# Patient Record
Sex: MALE | Race: WHITE | Employment: FULL TIME | URBAN - METROPOLITAN AREA
[De-identification: names, ages, dates, MRNs, and addresses within clinical notes are randomized per-mention and may not be internally consistent; named-entity substitution may affect disease eponyms.]

---
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
## 2017-01-17 ENCOUNTER — HOSPITAL ENCOUNTER (INPATIENT)
Age: 62
LOS: 4 days | Discharge: HOME HEALTH | DRG: 617 | End: 2017-01-21
Attending: EMERGENCY MEDICINE | Admitting: PODIATRIST
Payer: COMMERCIAL

## 2017-01-17 ENCOUNTER — APPOINTMENT (OUTPATIENT)
Dept: MRI IMAGING | Age: 62
DRG: 617 | End: 2017-01-17
Attending: EMERGENCY MEDICINE
Payer: COMMERCIAL

## 2017-01-17 DIAGNOSIS — L97.529 DIABETIC ULCER OF BOTH FEET ASSOCIATED WITH OTHER SPECIFIED DIABETES MELLITUS: Primary | ICD-10-CM

## 2017-01-17 DIAGNOSIS — L97.519 DIABETIC ULCER OF BOTH FEET ASSOCIATED WITH OTHER SPECIFIED DIABETES MELLITUS: Primary | ICD-10-CM

## 2017-01-17 DIAGNOSIS — E13.621 DIABETIC ULCER OF BOTH FEET ASSOCIATED WITH OTHER SPECIFIED DIABETES MELLITUS: Primary | ICD-10-CM

## 2017-01-17 PROBLEM — E11.40 DIABETES MELLITUS WITH NEUROPATHY (HCC): Status: ACTIVE | Noted: 2017-01-17

## 2017-01-17 PROBLEM — M86.9 OSTEOMYELITIS OF LEFT FOOT (HCC): Status: ACTIVE | Noted: 2017-01-17

## 2017-01-17 PROBLEM — M86.9 OSTEOMYELITIS OF RIGHT FOOT (HCC): Status: ACTIVE | Noted: 2017-01-17

## 2017-01-17 LAB
ALBUMIN SERPL BCP-MCNC: 2.5 G/DL (ref 3.5–5)
ALBUMIN/GLOB SERPL: 0.4 {RATIO} (ref 1.1–2.2)
ALP SERPL-CCNC: 96 U/L (ref 45–117)
ALT SERPL-CCNC: 134 U/L (ref 12–78)
ANION GAP BLD CALC-SCNC: 10 MMOL/L (ref 5–15)
APTT PPP: 31.5 SEC (ref 22.1–32.5)
AST SERPL W P-5'-P-CCNC: 49 U/L (ref 15–37)
BASOPHILS # BLD AUTO: 0 K/UL (ref 0–0.1)
BASOPHILS # BLD: 0 % (ref 0–1)
BILIRUB SERPL-MCNC: 0.3 MG/DL (ref 0.2–1)
BUN SERPL-MCNC: 15 MG/DL (ref 6–20)
BUN/CREAT SERPL: 14 (ref 12–20)
CALCIUM SERPL-MCNC: 9.4 MG/DL (ref 8.5–10.1)
CHLORIDE SERPL-SCNC: 100 MMOL/L (ref 97–108)
CO2 SERPL-SCNC: 26 MMOL/L (ref 21–32)
CREAT SERPL-MCNC: 1.08 MG/DL (ref 0.7–1.3)
EOSINOPHIL # BLD: 0.1 K/UL (ref 0–0.4)
EOSINOPHIL NFR BLD: 1 % (ref 0–7)
ERYTHROCYTE [DISTWIDTH] IN BLOOD BY AUTOMATED COUNT: 13.3 % (ref 11.5–14.5)
ERYTHROCYTE [SEDIMENTATION RATE] IN BLOOD: 30 MM/HR (ref 0–20)
GLOBULIN SER CALC-MCNC: 6.1 G/DL (ref 2–4)
GLUCOSE SERPL-MCNC: 224 MG/DL (ref 65–100)
HCT VFR BLD AUTO: 42.3 % (ref 36.6–50.3)
HGB BLD-MCNC: 14.5 G/DL (ref 12.1–17)
INR PPP: 1.1 (ref 0.9–1.1)
LACTATE SERPL-SCNC: 1.9 MMOL/L (ref 0.4–2)
LYMPHOCYTES # BLD AUTO: 17 % (ref 12–49)
LYMPHOCYTES # BLD: 1.6 K/UL (ref 0.8–3.5)
MCH RBC QN AUTO: 30.5 PG (ref 26–34)
MCHC RBC AUTO-ENTMCNC: 34.3 G/DL (ref 30–36.5)
MCV RBC AUTO: 88.9 FL (ref 80–99)
MONOCYTES # BLD: 0.8 K/UL (ref 0–1)
MONOCYTES NFR BLD AUTO: 8 % (ref 5–13)
NEUTS SEG # BLD: 7.3 K/UL (ref 1.8–8)
NEUTS SEG NFR BLD AUTO: 74 % (ref 32–75)
PLATELET # BLD AUTO: 395 K/UL (ref 150–400)
POTASSIUM SERPL-SCNC: 4.2 MMOL/L (ref 3.5–5.1)
PROT SERPL-MCNC: 8.6 G/DL (ref 6.4–8.2)
PROTHROMBIN TIME: 10.7 SEC (ref 9–11.1)
RBC # BLD AUTO: 4.76 M/UL (ref 4.1–5.7)
SODIUM SERPL-SCNC: 136 MMOL/L (ref 136–145)
THERAPEUTIC RANGE,PTTT: NORMAL SECS (ref 58–77)
WBC # BLD AUTO: 9.9 K/UL (ref 4.1–11.1)

## 2017-01-17 PROCEDURE — 65270000029 HC RM PRIVATE

## 2017-01-17 PROCEDURE — 85652 RBC SED RATE AUTOMATED: CPT | Performed by: EMERGENCY MEDICINE

## 2017-01-17 PROCEDURE — 36415 COLL VENOUS BLD VENIPUNCTURE: CPT | Performed by: EMERGENCY MEDICINE

## 2017-01-17 PROCEDURE — 80053 COMPREHEN METABOLIC PANEL: CPT | Performed by: PHYSICIAN ASSISTANT

## 2017-01-17 PROCEDURE — 85025 COMPLETE CBC W/AUTO DIFF WBC: CPT | Performed by: PHYSICIAN ASSISTANT

## 2017-01-17 PROCEDURE — 85730 THROMBOPLASTIN TIME PARTIAL: CPT | Performed by: EMERGENCY MEDICINE

## 2017-01-17 PROCEDURE — 99284 EMERGENCY DEPT VISIT MOD MDM: CPT

## 2017-01-17 PROCEDURE — 87040 BLOOD CULTURE FOR BACTERIA: CPT | Performed by: EMERGENCY MEDICINE

## 2017-01-17 PROCEDURE — 85610 PROTHROMBIN TIME: CPT | Performed by: EMERGENCY MEDICINE

## 2017-01-17 PROCEDURE — 73720 MRI LWR EXTREMITY W/O&W/DYE: CPT

## 2017-01-17 PROCEDURE — 74011250636 HC RX REV CODE- 250/636: Performed by: EMERGENCY MEDICINE

## 2017-01-17 PROCEDURE — 93005 ELECTROCARDIOGRAM TRACING: CPT

## 2017-01-17 PROCEDURE — A9585 GADOBUTROL INJECTION: HCPCS | Performed by: EMERGENCY MEDICINE

## 2017-01-17 PROCEDURE — 83605 ASSAY OF LACTIC ACID: CPT | Performed by: EMERGENCY MEDICINE

## 2017-01-17 RX ORDER — PRAVASTATIN SODIUM 20 MG/1
20 TABLET ORAL
COMMUNITY
End: 2018-10-22 | Stop reason: SDUPTHER

## 2017-01-17 RX ORDER — BISMUTH SUBSALICYLATE 262 MG
1 TABLET,CHEWABLE ORAL DAILY
COMMUNITY
End: 2019-05-08 | Stop reason: ALTCHOICE

## 2017-01-17 RX ORDER — LISINOPRIL 5 MG/1
5 TABLET ORAL DAILY
COMMUNITY
End: 2018-10-22 | Stop reason: SDUPTHER

## 2017-01-17 RX ORDER — SODIUM CHLORIDE 9 MG/ML
125 INJECTION, SOLUTION INTRAVENOUS CONTINUOUS
Status: DISCONTINUED | OUTPATIENT
Start: 2017-01-17 | End: 2017-01-18

## 2017-01-17 RX ORDER — GLYBURIDE 5 MG/1
5 TABLET ORAL 3 TIMES DAILY
COMMUNITY
End: 2018-10-22 | Stop reason: SDUPTHER

## 2017-01-17 RX ORDER — SULFAMETHOXAZOLE AND TRIMETHOPRIM 800; 160 MG/1; MG/1
1 TABLET ORAL 2 TIMES DAILY
COMMUNITY
End: 2017-02-02 | Stop reason: SDUPTHER

## 2017-01-17 RX ORDER — GUAIFENESIN 100 MG/5ML
81 LIQUID (ML) ORAL DAILY
COMMUNITY

## 2017-01-17 RX ORDER — CEPHALEXIN 500 MG/1
1000 CAPSULE ORAL 2 TIMES DAILY
COMMUNITY
End: 2017-01-21

## 2017-01-17 RX ADMIN — SODIUM CHLORIDE 125 ML/HR: 900 INJECTION, SOLUTION INTRAVENOUS at 18:36

## 2017-01-17 RX ADMIN — GADOBUTROL 9 ML: 604.72 INJECTION INTRAVENOUS at 19:43

## 2017-01-17 RX ADMIN — VANCOMYCIN HYDROCHLORIDE 2250 MG: 10 INJECTION, POWDER, LYOPHILIZED, FOR SOLUTION INTRAVENOUS at 18:35

## 2017-01-17 NOTE — IP AVS SNAPSHOT
[address] [facility] 
[phone] Patient: Briana Romano MRN: [id number] [id number] You are allergic to the following No active allergies Recent Documentation Height Weight BMI Smoking Status 1.778 m 94.8 kg 29.99 kg/m2 Never Smoker Unresulted Labs Order Current Status CULTURE, BLOOD Preliminary result CULTURE, BLOOD Preliminary result Emergency Contacts Name Discharge Info Relation Home Work [facility] DISCHARGE CAREGIVER [3] Sister [23]   [phone] About your hospitalization You were admitted on:  January 17, 2017 You last received care in the:  [facility] ORTHOPEDICS You were discharged on:  January 21, 2017 Unit phone number:  [phone] Why you were hospitalized Your primary diagnosis was:  Not on File Your diagnoses also included:  Diabetes Mellitus With Neuropathy (Hcc), Osteomyelitis Of Left Foot (Hcc), Osteomyelitis Of Right Foot (Hcc) Providers Seen During Your Hospitalizations Provider Role Specialty Primary office phone Ifeoma Alonso MD Attending Provider Emergency Medicine [phone] Marcelina Lord DPM Attending Provider Podiatry [phone] Your Primary Care Physician (PCP) Primary Care Physician Office Phone Office Fax Nigel Vazquez [phone] Follow-up Information Follow up With Details Comments Contact Info Ava Ma MD   [address] [facility] 
[phone] Marcelina Lord DPM In 1 week  [address] [facility] [address] [facility] 
[phone] [address] [id number] 
[phone] Current Discharge Medication List  
  
START taking these medications Dose & Instructions Dispensing Information Comments Morning Noon Evening Bedtime  
 oxyCODONE-acetaminophen 5-325 mg per tablet Commonly known as:  PERCOCET Your next dose is: Today, Tomorrow Other:  _________ Dose:  1-2 Tab Take 1-2 Tabs by mouth every four (4) hours as needed. Max Daily Amount: 12 Tabs. Quantity:  40 Tab Refills:  0 CONTINUE these medications which have CHANGED Dose & Instructions Dispensing Information Comments Morning Noon Evening Bedtime * trimethoprim-sulfamethoxazole 160-800 mg per tablet Commonly known as:  BACTRIM DS, SEPTRA DS What changed:  Another medication with the same name was added. Make sure you understand how and when to take each. Your next dose is: Today, Tomorrow Other:  _________ Dose:  1 Tab Take 1 Tab by mouth two (2) times a day. Refills:  0  
     
   
   
   
  
 * trimethoprim-sulfamethoxazole 160-800 mg per tablet Commonly known as:  BACTRIM DS, SEPTRA DS What changed: You were already taking a medication with the same name, and this prescription was added. Make sure you understand how and when to take each. Your next dose is: Today, Tomorrow Other:  _________ Dose:  1 Tab Take 1 Tab by mouth two (2) times a day for 21 days. Take with food. Take until done. Quantity:  42 Tab Refills:  0  
     
   
   
   
  
 * Notice: This list has 2 medication(s) that are the same as other medications prescribed for you. Read the directions carefully, and ask your doctor or other care provider to review them with you. CONTINUE these medications which have NOT CHANGED Dose & Instructions Dispensing Information Comments Morning Noon Evening Bedtime  
 aspirin 81 mg chewable tablet Your next dose is: Today, Tomorrow Other:  _________ Dose:  81 mg Take 81 mg by mouth daily. Refills:  0  
     
   
   
   
  
 glyBURIDE 5 mg tablet Commonly known as:  Ferlien Sapna Your next dose is: Today, Tomorrow Other:  _________ Dose:  5 mg Take 5 mg by mouth three (3) times daily. Refills:  0 JARDIANCE 10 mg tablet Generic drug:  empagliflozin Your next dose is: Today, Tomorrow Other:  _________ Take  by mouth daily. Refills:  0  
     
   
   
   
  
 lisinopril 5 mg tablet Commonly known as:  Nonah Мария Your next dose is: Today, Tomorrow Other:  _________ Dose:  5 mg Take 5 mg by mouth daily. Refills:  0  
     
   
   
   
  
 multivitamin tablet Commonly known as:  ONE A DAY Your next dose is: Today, Tomorrow Other:  _________ Dose:  1 Tab Take 1 Tab by mouth daily. Refills:  0  
     
   
   
   
  
 pravastatin 20 mg tablet Commonly known as:  PRAVACHOL Your next dose is: Today, Tomorrow Other:  _________ Dose:  20 mg Take 20 mg by mouth nightly. Refills:  0 STOP taking these medications KEFLEX 500 mg capsule Generic drug:  cephALEXin Where to Get Your Medications Information on where to get these meds will be given to you by the nurse or doctor. ! Ask your nurse or doctor about these medications  
  oxyCODONE-acetaminophen 5-325 mg per tablet  
 trimethoprim-sulfamethoxazole 160-800 mg per tablet Discharge Instructions Follow up with Dr. Noah Arroyo (Foot Doc [phone]) in one week. Wound Care: please pack right and left foot wounds with 1/4\" plain packing and dress feet with gauze dressing every other day. Activity: weightbear as tolerated to both feet using surgical shoes. Elevate feet when at rest. 
 
Discharge Orders None MyChart Announcement  We are excited to announce that we are making your provider's discharge notes available to you in [employer]. You will see these notes when they are completed and signed by the physician that discharged you from your recent hospital stay. If you have any questions or concerns about any information you see in [employer], please call the Health Information Department where you were seen or reach out to your Primary Care Provider for more information about your plan of care. Introducing [facility] [place] [facility]! [employer] introduces [employer] patient portal. Now you can access parts of your medical record, email your doctor's office, and request medication refills online. 1. In your internet browser, go to [email or web address] 2. Click on the First Time User? Click Here link in the Sign In box. You will see the New Member Sign Up page. 3. Enter your [employer] Access Code exactly as it appears below. You will not need to use this code after youve completed the sign-up process. If you do not sign up before the expiration date, you must request a new code. · [employer] Access Code: [email or web address] Expires: 4/13/2017 10:44 AM 
 
4. Enter the last four digits of your Social Security Number (xxxx) and Date of Birth (mm/dd/yyyy) as indicated and click Submit. You will be taken to the next sign-up page. 5. Create a ePartnerst ID. This will be your [employer] login ID and cannot be changed, so think of one that is secure and easy to remember. 6. Create a [employer] password. You can change your password at any time. 7. Enter your Password Reset Question and Answer. This can be used at a later time if you forget your password. 8. Enter your e-mail address. You will receive e-mail notification when new information is available in [address] 9. Click Sign Up. You can now view and download portions of your medical record. 10. Click the Download Summary menu link to download a portable copy of your medical information. If you have questions, please visit the Frequently Asked Questions section of the MyChart website. Remember, MyChart is NOT to be used for urgent needs. For medical emergencies, dial 911. Now available from your iPhone and Android! General Information Please provide this summary of care documentation to your next provider. Patient Signature:  ____________________________________________________________ Date:  ____________________________________________________________  
  
Paulene Greener Provider Signature:  ____________________________________________________________ Date:  ____________________________________________________________

## 2017-01-17 NOTE — ED PROVIDER NOTES
HPI Comments: César Beauchamp is a 59 y.o. male with PMhx significant for DM and HTN who presents ambulatory with BL cast boots and dressings on BL feet to the ED by referral from Dr. Naga Sy for surgical admission. Pt states that his left 5th metatarsal and his right 1st metatarsal are infected and acutely exacerbated  x10+ days. Pt notes that he was in [address] [place] [address] Street on 12/30/16 when the blisters on his right foot popped and his great toe has been progressively worsening since. He also discloses that he stepped on a nail with his left foot in June of 2016 and has had ruptured blisters on his left foot since and progressively worsening x10 days. He discloses that he has been experiencing BL foot swelling and numbness of his feet but is not experiencing any foot discomfort. He reports that he saw Dr. Naga Sy yesterday for evaluation and was instructed to proceed to the ED for upcoming surgery. He endorses that he has been on Bactrim and Cephalexin x6 days for the infection but on no abx prior. Pt discloses that he recently got over flu like sx but has not had any sx in ~ a week. He specifically denies any fever, chills, cough, nausea, vomiting, chest pain, or abdominal pain at this time. PCP: Sage Castillo MD      There are no other complaints, changes or physical findings at this time. Written by VEDA Murillo, as dictated by Yumiko Brown MD     The history is provided by the patient. No  was used. No past medical history on file. No past surgical history on file. No family history on file. Social History     Social History    Marital status:      Spouse name: N/A    Number of children: N/A    Years of education: N/A     Occupational History    Not on file.      Social History Main Topics    Smoking status: Not on file    Smokeless tobacco: Not on file    Alcohol use Not on file    Drug use: Not on file    Sexual activity: Not on file Other Topics Concern    Not on file     Social History Narrative         ALLERGIES: Review of patient's allergies indicates no known allergies. Review of Systems   Constitutional: Negative for chills, diaphoresis, fever and unexpected weight change. HENT: Negative for rhinorrhea and sore throat. Eyes: Negative for pain. Respiratory: Negative for cough, shortness of breath, wheezing and stridor. Cardiovascular: Positive for leg swelling (BL feet). Negative for chest pain. Gastrointestinal: Negative for abdominal pain, blood in stool, diarrhea, nausea and vomiting. Genitourinary: Negative for difficulty urinating, dysuria and flank pain. Musculoskeletal: Negative for back pain and neck stiffness. Skin: Positive for wound (lesions/blisters/ulcers on BL feet ). Negative for rash. Neurological: Positive for numbness (BL feet). Negative for seizures, syncope, weakness, light-headedness and headaches. Psychiatric/Behavioral: Negative for confusion. Patient Vitals for the past 12 hrs:   Temp Pulse Resp BP SpO2   01/17/17 1226 97.4 °F (36.3 °C) 82 16 137/68 100 %      Physical Exam   Constitutional: He is oriented to person, place, and time. He appears well-developed and well-nourished. No distress. HENT:   Nose: Nose normal.   Mouth/Throat: Oropharynx is clear and moist. No oropharyngeal exudate. Eyes: Conjunctivae and EOM are normal. Pupils are equal, round, and reactive to light. Right eye exhibits no discharge. Left eye exhibits no discharge. No scleral icterus. Neck: Normal range of motion. Neck supple. No JVD present. Cardiovascular: Normal rate, regular rhythm, normal heart sounds and intact distal pulses. No murmur heard. Pulmonary/Chest: Effort normal and breath sounds normal. No stridor. No respiratory distress. He has no wheezes. He has no rales. Abdominal: Soft. Bowel sounds are normal. He exhibits no distension. There is no tenderness.  There is no rebound and no guarding. Musculoskeletal: He exhibits no edema or tenderness. Neurological: He is alert and oriented to person, place, and time. Skin: Skin is warm and dry. He is not diaphoretic. Macerated, necrotic, foul smelling lesions to the left small toe  Ulcer present on the plantar aspect of the left 4th metatarsal head    Macerated, necrotic, foul smelling lesion on right great toe   Psychiatric: He has a normal mood and affect. Nursing note and vitals reviewed. MDM  Number of Diagnoses or Management Options  Diabetic ulcer of both feet associated with other specified diabetes mellitus ([facility] [address]):      Amount and/or Complexity of Data Reviewed  Clinical lab tests: ordered and reviewed  Tests in the medicine section of CPT®: ordered and reviewed  Review and summarize past medical records: yes  Discuss the patient with other providers: yes (Podiatry )  Independent visualization of images, tracings, or specimens: yes    Patient Progress  Patient progress: stable    ED Course       Procedures    PROGRESS NOTE:  2:14 PM  Pt has been re-evaluated. Pt's BL feet wounds were redressed. Written by Lulú Polo, ED Scribe, as dictated by Ebenezer Hickman MD.     EKG interpretation: (Preliminary)  1435  Rhythm: normal sinus rhythm and 1st degree AV block; and regular . Rate (approx.): 73; Axis: normal; ME interval: normal; QRS interval: normal ; ST/T wave: normal; Other findings: left anterior fascicular block; non-ischemic. Written by Lulú Polo, ED Scribe as dictated by Ebenezer Hickman MD.     CONSULT NOTE:  1944  Ebenezer Hickman MD spoke with Dr. Cisco Martínez,  Specialty: Podiatry   Discussed pt's hx, disposition, and available diagnostic and imaging results. Reviewed care plans. Consultant states that Ebenezer Hickman MD can address the pt's needs for his wounds at this time. Written by Lulú Polo, ED Scribe, as dictated by Ebenezer Hickman MD.     PROGRESS NOTE:  4:41 PM  Ebenezer Hickman MD spoke with Dr. Edith Chan again and was instructed to admit the pt for pre-operation workup. Written by VEDA Norwood, as dictated by Odalys Cronin MD.    LABORATORY TESTS:  Recent Results (from the past 12 hour(s))   CBC WITH AUTOMATED DIFF    Collection Time: 01/17/17  1:19 PM   Result Value Ref Range    WBC 9.9 4.1 - 11.1 K/uL    RBC 4.76 4.10 - 5.70 M/uL    HGB 14.5 12.1 - 17.0 g/dL    HCT 42.3 36.6 - 50.3 %    MCV 88.9 80.0 - 99.0 FL    MCH 30.5 26.0 - 34.0 PG    MCHC 34.3 30.0 - 36.5 g/dL    RDW 13.3 11.5 - 14.5 %    PLATELET 564 130 - 867 K/uL    NEUTROPHILS 74 32 - 75 %    LYMPHOCYTES 17 12 - 49 %    MONOCYTES 8 5 - 13 %    EOSINOPHILS 1 0 - 7 %    BASOPHILS 0 0 - 1 %    ABS. NEUTROPHILS 7.3 1.8 - 8.0 K/UL    ABS. LYMPHOCYTES 1.6 0.8 - 3.5 K/UL    ABS. MONOCYTES 0.8 0.0 - 1.0 K/UL    ABS. EOSINOPHILS 0.1 0.0 - 0.4 K/UL    ABS. BASOPHILS 0.0 0.0 - 0.1 K/UL   METABOLIC PANEL, COMPREHENSIVE    Collection Time: 01/17/17  1:19 PM   Result Value Ref Range    Sodium 136 136 - 145 mmol/L    Potassium 4.2 3.5 - 5.1 mmol/L    Chloride 100 97 - 108 mmol/L    CO2 26 21 - 32 mmol/L    Anion gap 10 5 - 15 mmol/L    Glucose 224 (H) 65 - 100 mg/dL    BUN 15 6 - 20 MG/DL    Creatinine 1.08 0.70 - 1.30 MG/DL    BUN/Creatinine ratio 14 12 - 20      GFR est AA >60 >60 ml/min/1.73m2    GFR est non-AA >60 >60 ml/min/1.73m2    Calcium 9.4 8.5 - 10.1 MG/DL    Bilirubin, total 0.3 0.2 - 1.0 MG/DL     (H) 12 - 78 U/L    AST 49 (H) 15 - 37 U/L    Alk.  phosphatase 96 45 - 117 U/L    Protein, total 8.6 (H) 6.4 - 8.2 g/dL    Albumin 2.5 (L) 3.5 - 5.0 g/dL    Globulin 6.1 (H) 2.0 - 4.0 g/dL    A-G Ratio 0.4 (L) 1.1 - 2.2     EKG, 12 LEAD, INITIAL    Collection Time: 01/17/17  2:35 PM   Result Value Ref Range    Ventricular Rate 73 BPM    Atrial Rate 73 BPM    P-R Interval 212 ms    QRS Duration 94 ms    Q-T Interval 378 ms    QTC Calculation (Bezet) 416 ms    Calculated P Axis 13 degrees    Calculated R Axis -57 degrees Calculated T Axis 37 degrees    Diagnosis       Sinus rhythm with 1st degree AV block  Left anterior fascicular block  No previous ECGs available         IMAGING RESULTS:  MRI FOOT LT W CONT    (Results Pending)       MEDICATIONS GIVEN:  Medications   0.9% sodium chloride infusion (not administered)   vancomycin (VANCOCIN) 1,902 mg in 0.9% sodium chloride 500 mL IVPB (not administered)       IMPRESSION:  1. Diabetic ulcer of both feet associated with other specified diabetes mellitus ([facility] [address]        PLAN:  1. Admission    Admit Note:  4:44 PM  Patient is being admitted to the hospital by Dr. Nick Gutierrez. The results of their tests and reasons for their admission have been discussed with the patient and/or available family. They convey their agreement and understanding for the need to be admitted and for their admission diagnosis. Written by Lj Polo, ED Scribe, as dictated by Gentry Hill MD.    Attestation: This note is prepared by Netta Polo, acting as Scribe for Gentry Hill MD.      Gentry Hill MD: The scribe's documentation has been prepared under my direction and personally reviewed by me in its entirety. I confirm that the note above accurately reflects all work, treatment, procedures, and medical decision making performed by me.

## 2017-01-17 NOTE — ED NOTES
Notified isaiah Lee, for assistance with second blood culture because stick at second location for new IV start was unsuccessful x1. The patient would benefit from a second IV site for admission.   Juliana Bahena, phlebotomist.

## 2017-01-17 NOTE — H&P
Podiatry History and Physical    Subjective:           Patient is a 59 y.o.  male who is being seen for right and left foot wounds with osteomyelitis. Was being seen by Dr. Meagan Fitzpatrick over the last year for wounds. Patient states it all began after stepping on [place]. He notes recent redness, and drainage to the right and left foot with worsening wounds. Denies any n/f/v/c. Was seen by myself yesterday and told to go directly to the ER for IV antibiotics and will need to have foot amputations done. States DM is well controlled. There are no active problems to display for this patient. Past Medical History   Diagnosis Date    Diabetes ([facility] [address]     Hypertension       History reviewed. No pertinent family history. Social History   Substance Use Topics    Smoking status: Never Smoker    Smokeless tobacco: Not on file    Alcohol use No     Past Surgical History   Procedure Laterality Date    Hx orthopaedic        Prior to Admission medications    Medication Sig Start Date End Date Taking? Authorizing Provider   pravastatin (PRAVACHOL) 20 mg tablet Take 20 mg by mouth nightly. Yes Ofelia Arellano MD   lisinopril (PRINIVIL, ZESTRIL) 5 mg tablet Take 5 mg by mouth daily. Yes Ofelia Arellano MD   sAXagliptin-metFORMIN (KOMBIGLYZE XR) 2.5-1,000 mg TM24 Take 2 Tabs by mouth. Yes Ofelia Arellano MD   glyBURIDE (DIABETA) 5 mg tablet Take 5 mg by mouth three (3) times daily. Yes Ofelia Arellano MD   aspirin 81 mg chewable tablet Take 81 mg by mouth daily. Yes Ofelia Arellano MD   multivitamin (ONE A DAY) tablet Take 1 Tab by mouth daily. Yes Ofelia Arellano MD     No Known Allergies     Review of Systems:  A comprehensive review of systems was negative except for that written in the HPI.     Objective:     Patient Vitals for the past 8 hrs:   BP Temp Pulse Resp SpO2 Height Weight   01/17/17 1226 137/68 97.4 °F (36.3 °C) 82 16 100 % 5' 10\" (1.778 m) 95.1 kg (209 lb 10.5 oz) Temp (24hrs), Av.4 °F (36.3 °C), Min:97.4 °F (36.3 °C), Max:97.4 °F (36.3 °C)      General:  Alert, cooperative, well noursished, well developed, appears stated age   Eyes:  Sclera anicteric. Pupils equally round and reactive to light. Mouth/Throat: Mucous membranes normal, oral pharynx clear   Neck: Supple   Lungs:   Clear to auscultation bilaterally, good effort   CV:  Regular rate and rhythm,no murmur, click, rub or gallop   Abdomen:   Soft, non-tender. bowel sounds normal. non-distended   Extremities: Exposed bone to the right great toe with exposed phalanx, and left 5th toe exposed bone probing to bone. Skin: Ulcer to right great toe, and left 5th toe sub 5th mtpj   Lymph nodes: Cervical and supraclavicular normal   Musculoskeletal: No swelling or deformity   Lines/Devices:  Intact, no erythema, drainage or tenderness   Psych: Alert and oriented, normal mood affect given the setting           Data Review:   Recent Results (from the past 24 hour(s))   CBC WITH AUTOMATED DIFF    Collection Time: 17  1:19 PM   Result Value Ref Range    WBC 9.9 4.1 - 11.1 K/uL    RBC 4.76 4.10 - 5.70 M/uL    HGB 14.5 12.1 - 17.0 g/dL    HCT 42.3 36.6 - 50.3 %    MCV 88.9 80.0 - 99.0 FL    MCH 30.5 26.0 - 34.0 PG    MCHC 34.3 30.0 - 36.5 g/dL    RDW 13.3 11.5 - 14.5 %    PLATELET 655 623 - 060 K/uL    NEUTROPHILS 74 32 - 75 %    LYMPHOCYTES 17 12 - 49 %    MONOCYTES 8 5 - 13 %    EOSINOPHILS 1 0 - 7 %    BASOPHILS 0 0 - 1 %    ABS. NEUTROPHILS 7.3 1.8 - 8.0 K/UL    ABS. LYMPHOCYTES 1.6 0.8 - 3.5 K/UL    ABS. MONOCYTES 0.8 0.0 - 1.0 K/UL    ABS. EOSINOPHILS 0.1 0.0 - 0.4 K/UL    ABS.  BASOPHILS 0.0 0.0 - 0.1 K/UL   METABOLIC PANEL, COMPREHENSIVE    Collection Time: 17  1:19 PM   Result Value Ref Range    Sodium 136 136 - 145 mmol/L    Potassium 4.2 3.5 - 5.1 mmol/L    Chloride 100 97 - 108 mmol/L    CO2 26 21 - 32 mmol/L    Anion gap 10 5 - 15 mmol/L    Glucose 224 (H) 65 - 100 mg/dL    BUN 15 6 - 20 MG/DL Creatinine 1.08 0.70 - 1.30 MG/DL    BUN/Creatinine ratio 14 12 - 20      GFR est AA >60 >60 ml/min/1.73m2    GFR est non-AA >60 >60 ml/min/1.73m2    Calcium 9.4 8.5 - 10.1 MG/DL    Bilirubin, total 0.3 0.2 - 1.0 MG/DL     (H) 12 - 78 U/L    AST 49 (H) 15 - 37 U/L    Alk.  phosphatase 96 45 - 117 U/L    Protein, total 8.6 (H) 6.4 - 8.2 g/dL    Albumin 2.5 (L) 3.5 - 5.0 g/dL    Globulin 6.1 (H) 2.0 - 4.0 g/dL    A-G Ratio 0.4 (L) 1.1 - 2.2     EKG, 12 LEAD, INITIAL    Collection Time: 01/17/17  2:35 PM   Result Value Ref Range    Ventricular Rate 73 BPM    Atrial Rate 73 BPM    P-R Interval 212 ms    QRS Duration 94 ms    Q-T Interval 378 ms    QTC Calculation (Bezet) 416 ms    Calculated P Axis 13 degrees    Calculated R Axis -57 degrees    Calculated T Axis 37 degrees    Diagnosis       Sinus rhythm with 1st degree AV block  Left anterior fascicular block  No previous ECGs available           Impression:     Right great toe and left 5th ray osteomyelitis      Recommendation:     -Discussed with ER physician about patient and i will go ahead and place in admission orders  -will place on IV vancomycin, and zosyn for cellulitis and drainage of both feet  -Will place on lovenox for dvt ppx  -DM diet  -Bilateral heel weight bearing in postop shoes  -Will plan for surgery on Thursday morning for amputation of the right great toe, and left 5th ray

## 2017-01-17 NOTE — ED NOTES
MRI form faxed to MRI. 1740:  Labwork drawn, blood culture x1 drawn and sent. No acute distress noted.

## 2017-01-18 ENCOUNTER — ANESTHESIA (OUTPATIENT)
Dept: SURGERY | Age: 62
DRG: 617 | End: 2017-01-18
Payer: COMMERCIAL

## 2017-01-18 ENCOUNTER — ANESTHESIA EVENT (OUTPATIENT)
Dept: SURGERY | Age: 62
DRG: 617 | End: 2017-01-18
Payer: COMMERCIAL

## 2017-01-18 LAB
ATRIAL RATE: 73 BPM
CALCULATED P AXIS, ECG09: 13 DEGREES
CALCULATED R AXIS, ECG10: -57 DEGREES
CALCULATED T AXIS, ECG11: 37 DEGREES
DIAGNOSIS, 93000: NORMAL
GLUCOSE BLD STRIP.AUTO-MCNC: 125 MG/DL (ref 65–100)
GLUCOSE BLD STRIP.AUTO-MCNC: 141 MG/DL (ref 65–100)
GLUCOSE BLD STRIP.AUTO-MCNC: 153 MG/DL (ref 65–100)
GLUCOSE BLD STRIP.AUTO-MCNC: 75 MG/DL (ref 65–100)
GLUCOSE BLD STRIP.AUTO-MCNC: 75 MG/DL (ref 65–100)
GLUCOSE BLD STRIP.AUTO-MCNC: 76 MG/DL (ref 65–100)
P-R INTERVAL, ECG05: 212 MS
Q-T INTERVAL, ECG07: 378 MS
QRS DURATION, ECG06: 94 MS
QTC CALCULATION (BEZET), ECG08: 416 MS
SERVICE CMNT-IMP: ABNORMAL
SERVICE CMNT-IMP: NORMAL
VENTRICULAR RATE, ECG03: 73 BPM

## 2017-01-18 PROCEDURE — 76060000034 HC ANESTHESIA 1.5 TO 2 HR: Performed by: PODIATRIST

## 2017-01-18 PROCEDURE — 87185 SC STD ENZYME DETCJ PER NZM: CPT | Performed by: PODIATRIST

## 2017-01-18 PROCEDURE — 0Y6W0Z0 DETACHMENT AT LEFT 4TH TOE, COMPLETE, OPEN APPROACH: ICD-10-PCS | Performed by: PODIATRIST

## 2017-01-18 PROCEDURE — 76010000153 HC OR TIME 1.5 TO 2 HR: Performed by: PODIATRIST

## 2017-01-18 PROCEDURE — 87075 CULTR BACTERIA EXCEPT BLOOD: CPT | Performed by: PODIATRIST

## 2017-01-18 PROCEDURE — 88311 DECALCIFY TISSUE: CPT | Performed by: PODIATRIST

## 2017-01-18 PROCEDURE — 74011250636 HC RX REV CODE- 250/636: Performed by: PODIATRIST

## 2017-01-18 PROCEDURE — 77030005520 HC CATH URETH FOL38 BARD -A

## 2017-01-18 PROCEDURE — 77030006788 HC BLD SAW OSC STRY -B: Performed by: PODIATRIST

## 2017-01-18 PROCEDURE — 87205 SMEAR GRAM STAIN: CPT | Performed by: PODIATRIST

## 2017-01-18 PROCEDURE — 77030011640 HC PAD GRND REM COVD -A: Performed by: PODIATRIST

## 2017-01-18 PROCEDURE — 88305 TISSUE EXAM BY PATHOLOGIST: CPT | Performed by: PODIATRIST

## 2017-01-18 PROCEDURE — 77030002916 HC SUT ETHLN J&J -A: Performed by: PODIATRIST

## 2017-01-18 PROCEDURE — 74011250636 HC RX REV CODE- 250/636

## 2017-01-18 PROCEDURE — 74011000258 HC RX REV CODE- 258: Performed by: PODIATRIST

## 2017-01-18 PROCEDURE — 77030031139 HC SUT VCRL2 J&J -A: Performed by: PODIATRIST

## 2017-01-18 PROCEDURE — 74011250637 HC RX REV CODE- 250/637: Performed by: PODIATRIST

## 2017-01-18 PROCEDURE — 87147 CULTURE TYPE IMMUNOLOGIC: CPT | Performed by: PODIATRIST

## 2017-01-18 PROCEDURE — 74011000250 HC RX REV CODE- 250: Performed by: PODIATRIST

## 2017-01-18 PROCEDURE — 74011000250 HC RX REV CODE- 250

## 2017-01-18 PROCEDURE — 77030020754 HC CUF TRNQT 2BLA STRY -B: Performed by: PODIATRIST

## 2017-01-18 PROCEDURE — 0Y6P0Z0 DETACHMENT AT RIGHT 1ST TOE, COMPLETE, OPEN APPROACH: ICD-10-PCS | Performed by: PODIATRIST

## 2017-01-18 PROCEDURE — 77030018836 HC SOL IRR NACL ICUM -A: Performed by: PODIATRIST

## 2017-01-18 PROCEDURE — 74011250636 HC RX REV CODE- 250/636: Performed by: ANESTHESIOLOGY

## 2017-01-18 PROCEDURE — 76210000006 HC OR PH I REC 0.5 TO 1 HR: Performed by: PODIATRIST

## 2017-01-18 PROCEDURE — 0Y6Y0Z0 DETACHMENT AT LEFT 5TH TOE, COMPLETE, OPEN APPROACH: ICD-10-PCS | Performed by: PODIATRIST

## 2017-01-18 PROCEDURE — 82962 GLUCOSE BLOOD TEST: CPT

## 2017-01-18 PROCEDURE — 65270000029 HC RM PRIVATE

## 2017-01-18 PROCEDURE — 77030028224 HC PDNG CST BSNM -A: Performed by: PODIATRIST

## 2017-01-18 RX ORDER — SODIUM CHLORIDE 0.9 % (FLUSH) 0.9 %
5-10 SYRINGE (ML) INJECTION EVERY 8 HOURS
Status: DISCONTINUED | OUTPATIENT
Start: 2017-01-18 | End: 2017-01-21 | Stop reason: HOSPADM

## 2017-01-18 RX ORDER — MORPHINE SULFATE 2 MG/ML
2 INJECTION, SOLUTION INTRAMUSCULAR; INTRAVENOUS
Status: DISCONTINUED | OUTPATIENT
Start: 2017-01-18 | End: 2017-01-21 | Stop reason: HOSPADM

## 2017-01-18 RX ORDER — OXYCODONE AND ACETAMINOPHEN 5; 325 MG/1; MG/1
1 TABLET ORAL
Status: DISCONTINUED | OUTPATIENT
Start: 2017-01-18 | End: 2017-01-21 | Stop reason: HOSPADM

## 2017-01-18 RX ORDER — VANCOMYCIN/0.9 % SOD CHLORIDE 1.5G/250ML
1500 PLASTIC BAG, INJECTION (ML) INTRAVENOUS EVERY 12 HOURS
Status: DISCONTINUED | OUTPATIENT
Start: 2017-01-18 | End: 2017-01-21 | Stop reason: HOSPADM

## 2017-01-18 RX ORDER — SODIUM CHLORIDE 450 MG/100ML
50 INJECTION, SOLUTION INTRAVENOUS CONTINUOUS
Status: DISCONTINUED | OUTPATIENT
Start: 2017-01-18 | End: 2017-01-21 | Stop reason: HOSPADM

## 2017-01-18 RX ORDER — MIDAZOLAM HYDROCHLORIDE 1 MG/ML
INJECTION, SOLUTION INTRAMUSCULAR; INTRAVENOUS AS NEEDED
Status: DISCONTINUED | OUTPATIENT
Start: 2017-01-18 | End: 2017-01-18 | Stop reason: HOSPADM

## 2017-01-18 RX ORDER — MIDAZOLAM HYDROCHLORIDE 1 MG/ML
0.5 INJECTION, SOLUTION INTRAMUSCULAR; INTRAVENOUS
Status: DISCONTINUED | OUTPATIENT
Start: 2017-01-18 | End: 2017-01-18 | Stop reason: HOSPADM

## 2017-01-18 RX ORDER — DIPHENHYDRAMINE HYDROCHLORIDE 50 MG/ML
12.5 INJECTION, SOLUTION INTRAMUSCULAR; INTRAVENOUS AS NEEDED
Status: DISCONTINUED | OUTPATIENT
Start: 2017-01-18 | End: 2017-01-18 | Stop reason: HOSPADM

## 2017-01-18 RX ORDER — GLYCOPYRROLATE 0.2 MG/ML
INJECTION INTRAMUSCULAR; INTRAVENOUS AS NEEDED
Status: DISCONTINUED | OUTPATIENT
Start: 2017-01-18 | End: 2017-01-18 | Stop reason: HOSPADM

## 2017-01-18 RX ORDER — SODIUM CHLORIDE 0.9 % (FLUSH) 0.9 %
5-10 SYRINGE (ML) INJECTION AS NEEDED
Status: DISCONTINUED | OUTPATIENT
Start: 2017-01-18 | End: 2017-01-21 | Stop reason: HOSPADM

## 2017-01-18 RX ORDER — BUPIVACAINE HYDROCHLORIDE 5 MG/ML
INJECTION, SOLUTION EPIDURAL; INTRACAUDAL AS NEEDED
Status: DISCONTINUED | OUTPATIENT
Start: 2017-01-18 | End: 2017-01-18 | Stop reason: HOSPADM

## 2017-01-18 RX ORDER — DIPHENHYDRAMINE HYDROCHLORIDE 50 MG/ML
12.5 INJECTION, SOLUTION INTRAMUSCULAR; INTRAVENOUS
Status: DISCONTINUED | OUTPATIENT
Start: 2017-01-18 | End: 2017-01-18 | Stop reason: SDUPTHER

## 2017-01-18 RX ORDER — FENTANYL CITRATE 50 UG/ML
50 INJECTION, SOLUTION INTRAMUSCULAR; INTRAVENOUS AS NEEDED
Status: DISCONTINUED | OUTPATIENT
Start: 2017-01-18 | End: 2017-01-18 | Stop reason: HOSPADM

## 2017-01-18 RX ORDER — GLYBURIDE 5 MG/1
5 TABLET ORAL 3 TIMES DAILY
Status: DISCONTINUED | OUTPATIENT
Start: 2017-01-18 | End: 2017-01-21 | Stop reason: HOSPADM

## 2017-01-18 RX ORDER — ONDANSETRON 2 MG/ML
4 INJECTION INTRAMUSCULAR; INTRAVENOUS
Status: DISCONTINUED | OUTPATIENT
Start: 2017-01-18 | End: 2017-01-21 | Stop reason: HOSPADM

## 2017-01-18 RX ORDER — MIDAZOLAM HYDROCHLORIDE 1 MG/ML
1 INJECTION, SOLUTION INTRAMUSCULAR; INTRAVENOUS AS NEEDED
Status: DISCONTINUED | OUTPATIENT
Start: 2017-01-18 | End: 2017-01-18 | Stop reason: HOSPADM

## 2017-01-18 RX ORDER — ONDANSETRON 2 MG/ML
4 INJECTION INTRAMUSCULAR; INTRAVENOUS AS NEEDED
Status: DISCONTINUED | OUTPATIENT
Start: 2017-01-18 | End: 2017-01-18 | Stop reason: HOSPADM

## 2017-01-18 RX ORDER — ACETAMINOPHEN 325 MG/1
650 TABLET ORAL
Status: DISCONTINUED | OUTPATIENT
Start: 2017-01-18 | End: 2017-01-21 | Stop reason: HOSPADM

## 2017-01-18 RX ORDER — HYDROCODONE BITARTRATE AND ACETAMINOPHEN 5; 325 MG/1; MG/1
1 TABLET ORAL AS NEEDED
Status: DISCONTINUED | OUTPATIENT
Start: 2017-01-18 | End: 2017-01-18 | Stop reason: HOSPADM

## 2017-01-18 RX ORDER — PHENYLEPHRINE HCL IN 0.9% NACL 0.4MG/10ML
SYRINGE (ML) INTRAVENOUS AS NEEDED
Status: DISCONTINUED | OUTPATIENT
Start: 2017-01-18 | End: 2017-01-18 | Stop reason: HOSPADM

## 2017-01-18 RX ORDER — HYDROMORPHONE HYDROCHLORIDE 1 MG/ML
0.5 INJECTION, SOLUTION INTRAMUSCULAR; INTRAVENOUS; SUBCUTANEOUS
Status: DISCONTINUED | OUTPATIENT
Start: 2017-01-18 | End: 2017-01-18 | Stop reason: HOSPADM

## 2017-01-18 RX ORDER — PROPOFOL 10 MG/ML
INJECTION, EMULSION INTRAVENOUS
Status: DISCONTINUED | OUTPATIENT
Start: 2017-01-18 | End: 2017-01-18 | Stop reason: HOSPADM

## 2017-01-18 RX ORDER — LIDOCAINE HYDROCHLORIDE 10 MG/ML
0.1 INJECTION, SOLUTION EPIDURAL; INFILTRATION; INTRACAUDAL; PERINEURAL AS NEEDED
Status: DISCONTINUED | OUTPATIENT
Start: 2017-01-18 | End: 2017-01-18 | Stop reason: HOSPADM

## 2017-01-18 RX ORDER — HYDROMORPHONE HYDROCHLORIDE 2 MG/ML
INJECTION, SOLUTION INTRAMUSCULAR; INTRAVENOUS; SUBCUTANEOUS AS NEEDED
Status: DISCONTINUED | OUTPATIENT
Start: 2017-01-18 | End: 2017-01-18 | Stop reason: HOSPADM

## 2017-01-18 RX ORDER — LISINOPRIL 5 MG/1
5 TABLET ORAL DAILY
Status: DISCONTINUED | OUTPATIENT
Start: 2017-01-18 | End: 2017-01-21 | Stop reason: HOSPADM

## 2017-01-18 RX ORDER — SODIUM CHLORIDE, SODIUM LACTATE, POTASSIUM CHLORIDE, CALCIUM CHLORIDE 600; 310; 30; 20 MG/100ML; MG/100ML; MG/100ML; MG/100ML
100 INJECTION, SOLUTION INTRAVENOUS CONTINUOUS
Status: DISCONTINUED | OUTPATIENT
Start: 2017-01-18 | End: 2017-01-18 | Stop reason: HOSPADM

## 2017-01-18 RX ORDER — PRAVASTATIN SODIUM 10 MG/1
20 TABLET ORAL
Status: DISCONTINUED | OUTPATIENT
Start: 2017-01-18 | End: 2017-01-21 | Stop reason: HOSPADM

## 2017-01-18 RX ORDER — DOCUSATE SODIUM 100 MG/1
100 CAPSULE, LIQUID FILLED ORAL 2 TIMES DAILY
Status: DISCONTINUED | OUTPATIENT
Start: 2017-01-18 | End: 2017-01-21 | Stop reason: HOSPADM

## 2017-01-18 RX ORDER — FENTANYL CITRATE 50 UG/ML
25 INJECTION, SOLUTION INTRAMUSCULAR; INTRAVENOUS
Status: DISCONTINUED | OUTPATIENT
Start: 2017-01-18 | End: 2017-01-18 | Stop reason: HOSPADM

## 2017-01-18 RX ORDER — THERA TABS 400 MCG
1 TAB ORAL DAILY
Status: DISCONTINUED | OUTPATIENT
Start: 2017-01-18 | End: 2017-01-21 | Stop reason: HOSPADM

## 2017-01-18 RX ADMIN — Medication 10 ML: at 07:39

## 2017-01-18 RX ADMIN — Medication 10 ML: at 14:38

## 2017-01-18 RX ADMIN — SODIUM CHLORIDE 50 ML/HR: 450 INJECTION, SOLUTION INTRAVENOUS at 10:45

## 2017-01-18 RX ADMIN — SODIUM CHLORIDE 50 ML/HR: 450 INJECTION, SOLUTION INTRAVENOUS at 20:40

## 2017-01-18 RX ADMIN — Medication 80 MCG: at 19:03

## 2017-01-18 RX ADMIN — DOCUSATE SODIUM 100 MG: 100 CAPSULE, LIQUID FILLED ORAL at 09:24

## 2017-01-18 RX ADMIN — GLYCOPYRROLATE 0.1 MG: 0.2 INJECTION INTRAMUSCULAR; INTRAVENOUS at 18:16

## 2017-01-18 RX ADMIN — SODIUM CHLORIDE, POTASSIUM CHLORIDE, SODIUM LACTATE AND CALCIUM CHLORIDE: 600; 310; 30; 20 INJECTION, SOLUTION INTRAVENOUS at 17:50

## 2017-01-18 RX ADMIN — LISINOPRIL 5 MG: 5 TABLET ORAL at 09:24

## 2017-01-18 RX ADMIN — HYDROMORPHONE HYDROCHLORIDE 1 MG: 2 INJECTION, SOLUTION INTRAMUSCULAR; INTRAVENOUS; SUBCUTANEOUS at 18:10

## 2017-01-18 RX ADMIN — Medication 100 MCG: at 19:22

## 2017-01-18 RX ADMIN — MIDAZOLAM HYDROCHLORIDE 2 MG: 1 INJECTION, SOLUTION INTRAMUSCULAR; INTRAVENOUS at 17:50

## 2017-01-18 RX ADMIN — THERA TABS 1 TABLET: TAB at 09:24

## 2017-01-18 RX ADMIN — PROPOFOL 50 MCG/KG/MIN: 10 INJECTION, EMULSION INTRAVENOUS at 18:00

## 2017-01-18 RX ADMIN — Medication 10 ML: at 21:12

## 2017-01-18 RX ADMIN — VANCOMYCIN HYDROCHLORIDE 1500 MG: 10 INJECTION, POWDER, LYOPHILIZED, FOR SOLUTION INTRAVENOUS at 21:13

## 2017-01-18 RX ADMIN — GLYBURIDE 5 MG: 5 TABLET ORAL at 21:11

## 2017-01-18 RX ADMIN — GLYBURIDE 5 MG: 5 TABLET ORAL at 09:24

## 2017-01-18 RX ADMIN — Medication 10 ML: at 01:00

## 2017-01-18 RX ADMIN — OXYCODONE HYDROCHLORIDE AND ACETAMINOPHEN 1 TABLET: 5; 325 TABLET ORAL at 22:35

## 2017-01-18 RX ADMIN — PRAVASTATIN SODIUM 20 MG: 10 TABLET ORAL at 21:11

## 2017-01-18 NOTE — ED NOTES
Nourishment provided Healthy Choice meal, water and Diet Pepsi per verbal permission of Mattie Marie MD.

## 2017-01-18 NOTE — PROGRESS NOTES
emilien wiped down with CHG wipes. Gown and sheets changed.  Patient resting in bed comfortably with call bell in reach

## 2017-01-18 NOTE — ED NOTES
Will transport patient pending available tech. Inquired with Hipolito MURCIA and he is aware/states he will help when next available.

## 2017-01-18 NOTE — ED NOTES
TRANSFER - OUT REPORT:    Verbal report given to See note (name) on Anna Marte  being transferred to Ortho (unit) for routine progression of care       Report consisted of patients Situation, Background, Assessment and   Recommendations(SBAR). Information from the following report(s) SBAR, Kardex, ED Summary and MAR was reviewed with the receiving nurse. Lines:   Peripheral IV 01/17/17 Right Hand (Active)   Site Assessment Clean, dry, & intact 1/17/2017  4:24 PM   Phlebitis Assessment 0 1/17/2017  4:24 PM   Infiltration Assessment 0 1/17/2017  4:24 PM   Dressing Status Clean, dry, & intact 1/17/2017  4:24 PM   Dressing Type Transparent 1/17/2017  4:24 PM   Hub Color/Line Status Flushed 1/17/2017  4:24 PM       Peripheral IV 01/17/17 Left; Anterior Antecubital (Active)   Site Assessment Clean, dry, & intact 1/17/2017  7:22 PM   Phlebitis Assessment 0 1/17/2017  7:22 PM   Infiltration Assessment 0 1/17/2017  7:22 PM   Dressing Status Clean, dry, & intact 1/17/2017  7:22 PM        Opportunity for questions and clarification was provided.       Patient transported with:   Registered Nurse  Tech

## 2017-01-18 NOTE — PROGRESS NOTES
Progress Note    Patient: Aggie Medina MRN: [id number]  SSN: [id number]    YOB: 1955  Age: 59 y.o. Sex: male      Admit Date: 1/17/2017  * No surgery found *     Procedure:       Subjective:     Patient seen resting quiet and comfortably and no apparent distress. Pain on scale of 1. Patient admitted overnight. Got MRI of the left foot which shows osteomyelitis of the left foot. Has been NPO since 8am this morning after eating breakfast. Denies any n/f/v/c. Status post: osteomyelitis and ulcer    Objective:     Visit Vitals    /65    Pulse 75    Temp 99 °F (37.2 °C)    Resp 18    Ht 5' 10\" (1.778 m)    Wt 95.1 kg (209 lb 10.5 oz)    SpO2 97%    BMI 30.08 kg/m2        Physical Exam:    General:  Alert, cooperative, well noursished, well developed, appears stated age   Eyes:  Sclera anicteric. Pupils equally round and reactive to light. Mouth/Throat: Mucous membranes normal, oral pharynx clear   Neck: Supple   Lungs:  Clear to auscultation bilaterally, good effort   CV:  Regular rate and rhythm,no murmur, click, rub or gallop   Abdomen:  Soft, non-tender. bowel sounds normal. non-distended   Extremities: Exposed bone to the right great toe with exposed phalanx, and left 5th toe exposed bone probing to bone.     Skin: Ulcer to right great toe, and left 5th toe sub 5th mtpj   Lymph nodes: Cervical and supraclavicular normal   Musculoskeletal: No swelling or deformity   Lines/Devices:  Intact, no erythema, drainage or tenderness   Psych: Alert and oriented, normal mood affect given the setting     Recent Results (from the past 24 hour(s))   CBC WITH AUTOMATED DIFF    Collection Time: 01/17/17  1:19 PM   Result Value Ref Range    WBC 9.9 4.1 - 11.1 K/uL    RBC 4.76 4.10 - 5.70 M/uL    HGB 14.5 12.1 - 17.0 g/dL    HCT 42.3 36.6 - 50.3 %    MCV 88.9 80.0 - 99.0 FL    MCH 30.5 26.0 - 34.0 PG    MCHC 34.3 30.0 - 36.5 g/dL    RDW 13.3 11.5 - 14.5 %    PLATELET 550 452 - 372 K/uL NEUTROPHILS 74 32 - 75 %    LYMPHOCYTES 17 12 - 49 %    MONOCYTES 8 5 - 13 %    EOSINOPHILS 1 0 - 7 %    BASOPHILS 0 0 - 1 %    ABS. NEUTROPHILS 7.3 1.8 - 8.0 K/UL    ABS. LYMPHOCYTES 1.6 0.8 - 3.5 K/UL    ABS. MONOCYTES 0.8 0.0 - 1.0 K/UL    ABS. EOSINOPHILS 0.1 0.0 - 0.4 K/UL    ABS. BASOPHILS 0.0 0.0 - 0.1 K/UL   METABOLIC PANEL, COMPREHENSIVE    Collection Time: 01/17/17  1:19 PM   Result Value Ref Range    Sodium 136 136 - 145 mmol/L    Potassium 4.2 3.5 - 5.1 mmol/L    Chloride 100 97 - 108 mmol/L    CO2 26 21 - 32 mmol/L    Anion gap 10 5 - 15 mmol/L    Glucose 224 (H) 65 - 100 mg/dL    BUN 15 6 - 20 MG/DL    Creatinine 1.08 0.70 - 1.30 MG/DL    BUN/Creatinine ratio 14 12 - 20      GFR est AA >60 >60 ml/min/1.73m2    GFR est non-AA >60 >60 ml/min/1.73m2    Calcium 9.4 8.5 - 10.1 MG/DL    Bilirubin, total 0.3 0.2 - 1.0 MG/DL     (H) 12 - 78 U/L    AST 49 (H) 15 - 37 U/L    Alk.  phosphatase 96 45 - 117 U/L    Protein, total 8.6 (H) 6.4 - 8.2 g/dL    Albumin 2.5 (L) 3.5 - 5.0 g/dL    Globulin 6.1 (H) 2.0 - 4.0 g/dL    A-G Ratio 0.4 (L) 1.1 - 2.2     EKG, 12 LEAD, INITIAL    Collection Time: 01/17/17  2:35 PM   Result Value Ref Range    Ventricular Rate 73 BPM    Atrial Rate 73 BPM    P-R Interval 212 ms    QRS Duration 94 ms    Q-T Interval 378 ms    QTC Calculation (Bezet) 416 ms    Calculated P Axis 13 degrees    Calculated R Axis -57 degrees    Calculated T Axis 37 degrees    Diagnosis       Sinus rhythm with 1st degree AV block  Left anterior fascicular block  No previous ECGs available  Confirmed by Pankaj Barajas ([id number]) on 1/18/2017 9:08:15 AM     SED RATE (ESR)    Collection Time: 01/17/17  5:32 PM   Result Value Ref Range    Sed rate, automated 30 (H) 0 - 20 mm/hr   LACTIC ACID, PLASMA    Collection Time: 01/17/17  5:32 PM   Result Value Ref Range    Lactic acid 1.9 0.4 - 2.0 MMOL/L   PROTHROMBIN TIME + INR    Collection Time: 01/17/17  5:32 PM   Result Value Ref Range    INR 1.1 0.9 - 1.1 Prothrombin time 10.7 9.0 - 11.1 sec   PTT    Collection Time: 01/17/17  5:32 PM   Result Value Ref Range    aPTT 31.5 22.1 - 32.5 sec    aPTT, therapeutic range     58.0 - 77.0 SECS   CULTURE, BLOOD    Collection Time: 01/17/17  5:40 PM   Result Value Ref Range    Special Requests: NO SPECIAL REQUESTS      Culture result: NO GROWTH AFTER 13 HOURS     CULTURE, BLOOD    Collection Time: 01/17/17  6:18 PM   Result Value Ref Range    Special Requests: NO SPECIAL REQUESTS      Culture result: NO GROWTH AFTER 12 HOURS     GLUCOSE, POC    Collection Time: 01/18/17  8:10 AM   Result Value Ref Range    Glucose (POC) 125 (H) 65 - 100 mg/dL    Performed by Ro Ferrari \"Sushila\"            Labs/Radiology Review: images and reports reviewed    Assessment:     Hospital Problems  Never Reviewed          Codes Class Noted POA    Diabetes mellitus with neuropathy ([facility] ICD-10-CM: E11.40  ICD-9-CM: 250.60, 357.2  1/17/2017 Unknown        Osteomyelitis of left foot ([facility] ICD-10-CM: M86.9  ICD-9-CM: 730.27  1/17/2017 Unknown        Osteomyelitis of right foot [facility]) ICD-10-CM: M86.9  ICD-9-CM: 730.27  1/17/2017 Unknown              Plan/Recommendations/Medical Decision Making:     Continue present treatment    Activity: weight bearing    Discontinue: diet    Diet: npo    Education: Discussed with patient today that we will be going to the OR [facility] for right great toe amputation, and left 5th ray resection. We will obtain soft tissue cultures, and will evaluate metatarsals on left foot for infection. NPO starting now.  Scheduled for 4:30 surgery     lovenox dvt ppx      Signed By: Sera Zuniga DPM     January 18, 2017

## 2017-01-18 NOTE — ANESTHESIA PREPROCEDURE EVALUATION
Anesthetic History   No history of anesthetic complications            Review of Systems / Medical History  Patient summary reviewed, nursing notes reviewed and pertinent labs reviewed    Pulmonary  Within defined limits                 Neuro/Psych   Within defined limits           Cardiovascular    Hypertension              Exercise tolerance: >4 METS     GI/Hepatic/Renal  Within defined limits              Endo/Other    Diabetes         Other Findings            Physical Exam    Airway  Mallampati: II  TM Distance: 4 - 6 cm  Neck ROM: normal range of motion   Mouth opening: Normal     Cardiovascular  Regular rate and rhythm,  S1 and S2 normal,  no murmur, click, rub, or gallop             Dental  No notable dental hx       Pulmonary  Breath sounds clear to auscultation               Abdominal  GI exam deferred       Other Findings            Anesthetic Plan    ASA: 2  Anesthesia type: general and total IV anesthesia          Induction: Intravenous  Anesthetic plan and risks discussed with: Patient

## 2017-01-18 NOTE — ED NOTES
Patient reported that abx / fluids were stopped while he was at MRI / MRI area. Abx and fluids restarted for this patient upon return to his ER room.

## 2017-01-18 NOTE — ED NOTES
Spoke to Dr. Tammie Quintero to clarify that patient's current abx prescriptions were added to his med rec. Per ER MD, Leyda Craig is planned course of tx and pt is due for OR in the morning.

## 2017-01-18 NOTE — PERIOP NOTES
TRANSFER - IN REPORT:    Verbal report received from [place], RN(name) on Pedraza Men  being received from 3237(unit) for ordered procedure      Report consisted of patients Situation, Background, Assessment and   Recommendations(SBAR). Information from the following report(s) SBAR, Kardex and Intake/Output was reviewed with the receiving nurse. Opportunity for questions and clarification was provided.

## 2017-01-19 LAB
ANION GAP BLD CALC-SCNC: 10 MMOL/L (ref 5–15)
BASOPHILS # BLD AUTO: 0 K/UL (ref 0–0.1)
BASOPHILS # BLD: 0 % (ref 0–1)
BUN SERPL-MCNC: 10 MG/DL (ref 6–20)
BUN/CREAT SERPL: 11 (ref 12–20)
CALCIUM SERPL-MCNC: 8.3 MG/DL (ref 8.5–10.1)
CHLORIDE SERPL-SCNC: 102 MMOL/L (ref 97–108)
CO2 SERPL-SCNC: 24 MMOL/L (ref 21–32)
CREAT SERPL-MCNC: 0.95 MG/DL (ref 0.7–1.3)
EOSINOPHIL # BLD: 0.1 K/UL (ref 0–0.4)
EOSINOPHIL NFR BLD: 1 % (ref 0–7)
ERYTHROCYTE [DISTWIDTH] IN BLOOD BY AUTOMATED COUNT: 13.5 % (ref 11.5–14.5)
GLUCOSE BLD STRIP.AUTO-MCNC: 162 MG/DL (ref 65–100)
GLUCOSE BLD STRIP.AUTO-MCNC: 190 MG/DL (ref 65–100)
GLUCOSE BLD STRIP.AUTO-MCNC: 202 MG/DL (ref 65–100)
GLUCOSE BLD STRIP.AUTO-MCNC: 241 MG/DL (ref 65–100)
GLUCOSE SERPL-MCNC: 154 MG/DL (ref 65–100)
HCT VFR BLD AUTO: 39.6 % (ref 36.6–50.3)
HGB BLD-MCNC: 13 G/DL (ref 12.1–17)
LYMPHOCYTES # BLD AUTO: 14 % (ref 12–49)
LYMPHOCYTES # BLD: 1.3 K/UL (ref 0.8–3.5)
MCH RBC QN AUTO: 29.1 PG (ref 26–34)
MCHC RBC AUTO-ENTMCNC: 32.8 G/DL (ref 30–36.5)
MCV RBC AUTO: 88.8 FL (ref 80–99)
MONOCYTES # BLD: 0.8 K/UL (ref 0–1)
MONOCYTES NFR BLD AUTO: 9 % (ref 5–13)
NEUTS SEG # BLD: 6.8 K/UL (ref 1.8–8)
NEUTS SEG NFR BLD AUTO: 76 % (ref 32–75)
PLATELET # BLD AUTO: 323 K/UL (ref 150–400)
POTASSIUM SERPL-SCNC: 4.2 MMOL/L (ref 3.5–5.1)
RBC # BLD AUTO: 4.46 M/UL (ref 4.1–5.7)
SERVICE CMNT-IMP: ABNORMAL
SODIUM SERPL-SCNC: 136 MMOL/L (ref 136–145)
WBC # BLD AUTO: 9.1 K/UL (ref 4.1–11.1)

## 2017-01-19 PROCEDURE — 80048 BASIC METABOLIC PNL TOTAL CA: CPT | Performed by: PODIATRIST

## 2017-01-19 PROCEDURE — 97161 PT EVAL LOW COMPLEX 20 MIN: CPT

## 2017-01-19 PROCEDURE — G8979 MOBILITY GOAL STATUS: HCPCS

## 2017-01-19 PROCEDURE — 85025 COMPLETE CBC W/AUTO DIFF WBC: CPT | Performed by: PODIATRIST

## 2017-01-19 PROCEDURE — 36415 COLL VENOUS BLD VENIPUNCTURE: CPT | Performed by: PODIATRIST

## 2017-01-19 PROCEDURE — 74011250636 HC RX REV CODE- 250/636: Performed by: PODIATRIST

## 2017-01-19 PROCEDURE — G8978 MOBILITY CURRENT STATUS: HCPCS

## 2017-01-19 PROCEDURE — 65270000029 HC RM PRIVATE

## 2017-01-19 PROCEDURE — 97116 GAIT TRAINING THERAPY: CPT

## 2017-01-19 PROCEDURE — 82962 GLUCOSE BLOOD TEST: CPT

## 2017-01-19 PROCEDURE — 74011250637 HC RX REV CODE- 250/637: Performed by: PODIATRIST

## 2017-01-19 RX ADMIN — OXYCODONE HYDROCHLORIDE AND ACETAMINOPHEN 1 TABLET: 5; 325 TABLET ORAL at 03:56

## 2017-01-19 RX ADMIN — THERA TABS 1 TABLET: TAB at 09:05

## 2017-01-19 RX ADMIN — DOCUSATE SODIUM 100 MG: 100 CAPSULE, LIQUID FILLED ORAL at 17:48

## 2017-01-19 RX ADMIN — GLYBURIDE 5 MG: 5 TABLET ORAL at 17:48

## 2017-01-19 RX ADMIN — GLYBURIDE 5 MG: 5 TABLET ORAL at 09:04

## 2017-01-19 RX ADMIN — PRAVASTATIN SODIUM 20 MG: 10 TABLET ORAL at 21:00

## 2017-01-19 RX ADMIN — VANCOMYCIN HYDROCHLORIDE 1500 MG: 10 INJECTION, POWDER, LYOPHILIZED, FOR SOLUTION INTRAVENOUS at 21:00

## 2017-01-19 RX ADMIN — Medication 5 ML: at 14:00

## 2017-01-19 RX ADMIN — LISINOPRIL 5 MG: 5 TABLET ORAL at 09:04

## 2017-01-19 RX ADMIN — VANCOMYCIN HYDROCHLORIDE 1500 MG: 10 INJECTION, POWDER, LYOPHILIZED, FOR SOLUTION INTRAVENOUS at 09:05

## 2017-01-19 RX ADMIN — Medication 10 ML: at 21:00

## 2017-01-19 RX ADMIN — DOCUSATE SODIUM 100 MG: 100 CAPSULE, LIQUID FILLED ORAL at 09:04

## 2017-01-19 RX ADMIN — GLYBURIDE 5 MG: 5 TABLET ORAL at 21:00

## 2017-01-19 NOTE — PROGRESS NOTES
Breakthrough drainage noticed on L foot. Dressing changed using sterile 4x4, gauze and wrapped with elastic bandage. No active bleeding noticed. Patient tolerated procedure. 7:44 PM SHRADDHA SCHMITZ KARDEX report given to [facility], [place].

## 2017-01-19 NOTE — PROGRESS NOTES
Bedside and Verbal shift change report given to Nuria Hart RN (oncoming nurse) by Negrito Ferrera RN (offgoing nurse). Report included the following information SBAR, Kardex, ED Summary, OR Summary, Procedure Summary, Intake/Output, MAR, Accordion, Recent Results and Med Rec Status.

## 2017-01-19 NOTE — PROGRESS NOTES
Pharmacy  Vancomycin Dosing    Pharmacy consulted to dose vancomycin for this 59 y.o. male being treated for Osteo confirmed with MRI Post op     Other Current Antimicrobials: None   Significant Cultures: 1/17 MRI - Osteo       Paralysis, amputations, malnutrition   RT GREAT TOE AMPUTATION    Height and Weight  Ht Readings from Last 1 Encounters:   01/17/17 177.8 cm (70\")      Wt Readings from Last 1 Encounters:   01/17/17 95.1 kg (209 lb 10.5 oz)         Renal Function Creatinine Clearance (ml/min): 72    Recent Labs      01/17/17   1319   CREA  1.08   BUN  15      WBC Recent Labs      01/17/17   1319   WBC  9.9      Temp 97.6 °F (36.4 °C)     Loading dose: 2250 MG at 18:35 on 1/17/17 Pharmacy consulted to continue [address] on 1/18 20:00   Surgery on 1/18 for rt great toe amputation   Maintenance dose: 1500 MG Q12H  - Predicted trough of 17  Goal Level: 15-20   Pharmacy will follow daily and adjust as appropriate.     Thanks for the consult,  Linda Bunch, [facility]

## 2017-01-19 NOTE — PERIOP NOTES
TRANSFER - OUT REPORT:    Verbal report given to Hugh Alvarez RN  on Hugh Topete  being transferred to [facility](unit) for routine post - op       Report consisted of patients Situation, Background, Assessment and   Recommendations(SBAR). Information from the following report(s) SBAR, Kardex, OR Summary, Intake/Output, MAR and Recent Results was reviewed with the receiving nurse. Opportunity for questions and clarification was provided. Patient transported with:   Registered Nurse   Pt bradley aleman at 1947 74, awake and alert, asymptomatic, had apple juice, and ginger ale and jerson crackers as requested, tolerated well, wants to eat when he gets to room.  Diet order by Dr. Jen Oscar

## 2017-01-19 NOTE — DIABETES MGMT
DTC Progress Note    Recommendations/ Comments: If appropriate, please consider decreasing Glyburide to bid due to mild hypoglycemia. Chart reviewed on Sable Braver BG < 80 mg/dl with anti-hyperglycemia agents on board and npo status for surgery. Patient is a 59 y.o. male with known DM on Glyburide 5 mg tid at home with unknown control. A1c:   No results found for: HBA1C, HGBE8, PSA2GFYJ    Recent Glucose Results: Lab Results   Component Value Date/Time     (H) 01/19/2017 04:16 AM    GLUCPOC 162 (H) 01/19/2017 07:51 AM    GLUCPOC 141 (H) 01/18/2017 09:03 PM    GLUCPOC 75 01/18/2017 08:30 PM        Lab Results   Component Value Date/Time    Creatinine 0.95 01/19/2017 04:16 AM       Active Orders   Diet    DIET DIABETIC CONSISTENT CARB Regular        PO intake: Patient Vitals for the past 72 hrs:   % Diet Eaten   01/19/17 0924 100 %       Current hospital DM medication: Glyburide 5 mg tid. Will continue to follow as needed.     Thank you  Cynthia León RD CDE

## 2017-01-19 NOTE — OP NOTES
[address], [address]   OP NOTE       Name:  Mary Burton   MR#:  [id number]   :  1955   Account #:  [de-identified]    Surgery Date:  2017   Date of Adm:  2017       PREOPERATIVE DIAGNOSIS: Osteomyelitis in right great toe, left   fourth and fifth toe and metatarsal osteomyelitis. POSTOPERATIVE DIAGNOSIS: Osteomyelitis in right great toe, left   fourth and fifth toe and metatarsal osteomyelitis. PROCEDURES PERFORMED: Amputation, right great toe;   amputation, left fourth and fifth toes with metatarsals; incision and   drainage, abscess. ESTIMATED BLOOD LOSS: 0 mL. SPECIMENS REMOVED: Right great toe, left fourth and fifth toe with   metatarsal as well as abscess soft tissue, right foot and abscesses soft   tissue, left foot. ANESTHESIA: Local anesthesia used was 20 mL of 0.5% Marcaine   plain, our anesthesia used was MAC. PREOPERATIVE INDICATIONS: The patient is a 49-year-old diabetic   male with neuropathy of both feet, who presents with ulcerations of   both right and left feet x1 year, one on the left foot after stepping on a   nail, one on the right foot after stepping on a object in the ocean, had   been treating with conservative care, had failed this. After visit at my   office as a second opinion for osteomyelitis of both right and left feet,   discussed with the patient about the MRI which was obtained after   admission for right great toe osteomyelitis, as well as the left fourth   metatarsal and toe osteomyelitis and fifth toe and metatarsal   osteomyelitis, about the need for amputation. I discussed with the   patient all risks, benefits and alternatives, discussed with the patient   about these. Sent for culture, labs, and showed normal white blood cell   count while in-house, blood sugars remaining constant and all   constitutional symptoms were negative.  The patient is agreeable to the   elective procedure performed today. DESCRIPTION OF PROCEDURE: The patient was rolled to the   operating room, was put on the table in supine position. A first   preoperative time-out was performed to confirm that the right and left   feet were correct operative sites. The patient was prepped and draped   in the usual sterile fashion with Betadine paint and scrub, had an ankle   tourniquet applied to both the right and left feet. A second preop time-  out was performed to confirm that the right and left feet were correct   operative sites. Next, our attention was directed to the right foot, where an Esmarch   bandage was used to exsanguinate the right lower extremity. A local   block of 10 mL of 0.5% Marcaine plain was given to the right foot. Next, 2 semi-elliptical incisions were made about the right great toe,   and the toe was disarticulated at the metatarsophalangeal joint,  all   collateral ligaments in the base of the proximal phalanx were released,   the toe was removed in its entirety. There was noted to be purulent   drainage from the hallux after removal. There was very mild amount of   necrotic tissue from within the wound bed. The flexor and extensor   tendons were resected as far proximal as possible. The sesamoids   were left in place, as they appeared healthy and solid. There was no   discoloration in the first metatarsal visualized. There was still a dorsal   sinus directly about 2 cm proximal to the incision line. The site was   then copiously irrigated with normal saline. The soft tissue cultures for   aerobic, anaerobic culture and Gram stain were taken from the base of   the hallux amputation after the site was copiously irrigated with normal   saline. The deep tissue was then closed with interrupted 3-0 Vicryl   suture, and the skin was closed with an interrupted 3-0 nylon suture.    Half-inch iodoform packing was then placed in the dorsal sinus, and   then 4 x 4, Xeroform roll gauze and an Ace wrap were applied to the   right lower extremity. The right ankle tourniquet was deflated at 21   minutes. Next, our attention was directed to the left foot, where 10 mL 0.5%   Marcaine plain was injected to the left proximal mid foot. Next, an   Esmarch bandage used to exsanguinate the left lower extremity. Left   ankle tourniquet was inflated to 250 mmHg. Next, our attention was   directed to the left foot, where a plantar ulceration, left subfourth   metatarsal, was seen with purulent drainage coming from it, probed to   the metatarsal directly underneath the fourth metatarsal head. A  #15   blade was used to the left foot for 2 semi-elliptical incisions, were made   around the fourth and fifth toes to the 2 cm proximal to the base of the   metatarsophalangeal joints, as well as extending plantarly and excising   the previous ulceration from the puncture wound of the right foot. The   fifth toe and fourth toe were excised and disarticulated at their   metatarsophalangeal joints. A periosteal elevator was then used to   retract soft tissues proximally about to the distal 1/2 of the metatarsal.   The left fourth metatarsal was soft and had a grayish appearance and   was spongy, so for this, a TPS saw was then used to make a dorsal to   plantar cut about the distal 1/3 of the left fourth metatarsal, as well as   the left fifth metatarsal. Once these were excised, the site was   copiously irrigated. A rongeur was used to remove all nonviable tissue   until healthy bleeding tissue was observed observe on all skin edges,   and then deep cultures soft tissue, aerobic and anaerobic, Gram stain   were taken from the left foot wound site. The left fourth and fifth   metatarsals were sent for Pathology for evaluation of osteomyelitis,   and then the 3-0 Vicryl suture was then used to reapproximate the   deep tissues, and the skin was closed with interrupted 3-0 nylon   suture.  Half-inch iodoform packing was then placed into the 1.5 cm   ulceration that was left open in the incision in the left lateral foot. Xeroform, 4 x 4's, rolled gauze, and an Ace wrap were applied to the   left lower extremity. Left ankle tourniquet was deflated at 40 minutes. The patient was then awakened and taken to the PACU with all vitals   stable and intact. DISPOSITION: The patient will remain heel weightbearing to the right   and left lower extremities, will use a walker. Will evaluate for PT and   OT for transfer with use of a walker. Will also consider a bandage   change and packing changes tomorrow, and likely set up for home   health with case management within the next couple days. Will   continue to followup wound cultures and guide antibiotics accordingly. COMPLICATIONS: None. IMPLANTS: Half-inch iodoform packing.          KRISTEL Holden / Connor Devries   D:  01/18/2017   20:13   T:  01/19/2017   02:03   Job #:  [id number]

## 2017-01-19 NOTE — PERIOP NOTES
Handoff Report from Operating Room to PACU    Report received from [address] and Zainab Soulier ESTIVEN regarding Macel Crumb. Surgeon(s):  DAVINA Perez DPM  And Procedure(s) (LRB):  AMPUTATION RIGHT GREAT TOE, AMPUTATION LEFT 4TH AND 5TH TOES, WITH INCISION AND DRAINAGE OF ABSCESSES BILATERAL FEET (Bilateral)  confirmed   with dressings discussed. Anesthesia type, drugs, patient history, complications, estimated blood loss, vital signs, intake and output, and last pain medication were reviewed.

## 2017-01-19 NOTE — PROGRESS NOTES
Problem: Mobility Impaired (Adult and Pediatric)  Goal: *Acute Goals and Plan of Care (Insert Text)  Physical Therapy Goals  Initiated 1/19/2017  1. Patient will move from supine to sit and sit to supine , scoot up and down and roll side to side in bed with independence within 7 day(s). 2. Patient will transfer from bed to chair and chair to bed with modified independence using the least restrictive device within 7 day(s). 3. Patient will perform sit to stand with independence within 7 day(s). 4. Patient will ambulate with modified independence for 250 feet with the least restrictive device within 7 day(s). 5. Patient will ascend/descend 17 stairs with 1 handrail(s) with modified independence within 7 day(s). PHYSICAL THERAPY EVALUATION  Patient: Yuliana Rodriguez (66 y.o. male)  Date: 1/19/2017  Primary Diagnosis: Osteomyelitis of right foot (HCC)  Osteomyelitis of left foot (HCC)  Diabetes mellitus with neuropathy (HCC)  Osteomyelitis Right Great Toe and Left Fifth Toe  Procedure(s) (LRB):  AMPUTATION RIGHT GREAT TOE, AMPUTATION LEFT 4TH AND 5TH TOES, WITH INCISION AND DRAINAGE OF ABSCESSES BILATERAL FEET (Bilateral) 1 Day Post-Op   Precautions:  Fall (Heel WB SAUL)      ASSESSMENT : Chart reviewed. Patient cleared by nursing for PT evaluation. Based on the objective data described below, the patient presents with impaired standing balance, decreased AROM, generalized weakness, SAUL heel WB precautions, decreased independence, and with decline from baseline functional mobility s/p R great toe and L 4th and 5th toe amputation. Pt is independent at baseline and lives alone in two story home. Patient received supine in bed and agreeable to PT evaluation. Patient performed bed mobility with supervision and with VSS in sitting. Patient able to lindsay post-op shoes independently. Patient performed transfers with CGA-SBA, requiring education and VCs for safe hand placement.  Patient educated on heel WB precautions for standing activities and gait. Patient ambulated 200' with CGA x 1 using RW, demonstrating overall 75% adherence to WB precautions. Patient with steady gait and no LOB noted during mobility. Patient required frequent cueing for WB precautions for improved adherence. Patient was assisted to/from the bathroom with CGA-SBA prior to being assisted into bedside chair. Patient was left sitting in bedside chair with all needs met and nursing informed. Anticipate patient will continue to progress well in acute PT and return home with HHPT and use of RW. Patient will continue to benefit from skilled acute PT to improve gait and for stair training. Patient will benefit from skilled intervention to address the above impairments. Patients rehabilitation potential is considered to be Good  Factors which may influence rehabilitation potential include:   [ ]         None noted  [ ]         Mental ability/status  [X]         Medical condition  [X]         Home/family situation and support systems  [ ]         Safety awareness  [ ]         Pain tolerance/management  [ ]         Other:        PLAN :  Recommendations and Planned Interventions:  [X]           Bed Mobility Training             [ ]    Neuromuscular Re-Education  [X]           Transfer Training                   [ ]    Orthotic/Prosthetic Training  [X]           Gait Training                         [ ]    Modalities  [X]           Therapeutic Exercises           [ ]    Edema Management/Control  [X]           Therapeutic Activities            [X]    Patient and Family Training/Education  [X]           Other (comment):stair training     Frequency/Duration: Patient will be followed by physical therapy  5 times a week to address goals.   Discharge Recommendations: Home Health and To Be Determined  Further Equipment Recommendations for Discharge: straight cane, rolling walker and TBD based on progress in acute PT       SUBJECTIVE:   Patient stated My sister lives in [facility].       OBJECTIVE DATA SUMMARY:   HISTORY:    Past Medical History   Diagnosis Date    Diabetes ([address]      Hypertension       Past Surgical History   Procedure Laterality Date    Hx orthopaedic         Prior Level of Function/Home Situation: pt is independent for mobility at baseline; lives alone; drives; works at desk job; pt appears to not have much support in the area; denies fall history  Personal factors and/or comorbidities impacting plan of care: HTN; Diabetes     Home Situation  Home Environment: Private residence  # Steps to Enter: 4  Rails to Enter: Yes  Hand Rails : Right  Wheelchair Ramp: No  One/Two Story Residence: Two story  # of Interior Steps: 16  Interior Rails: Left  Lift Chair Available: No  Living Alone: Yes  Support Systems: Family member(s) (sister in [facility])  Patient Expects to be Discharged to[de-identified] Private residence  Current DME Used/Available at Home: Glucometer  Tub or Shower Type: Shower     EXAMINATION/PRESENTATION/DECISION MAKING:   Critical Behavior:  Neurologic State: Alert  Orientation Level: Oriented to time, Oriented to person, Oriented to place, Oriented to situation  Cognition: Appropriate decision making, Appropriate for age attention/concentration, Appropriate safety awareness     Skin:  Ace bandages on SAUL feet  Strength:    Strength: Generally decreased, functional                    Tone & Sensation:   Tone: Normal              Sensation: Impaired (numbness SAUL feet)               Range Of Motion:  AROM: Generally decreased, functional           PROM: Within functional limits           Coordination:  Coordination: Within functional limits     Functional Mobility:  Bed Mobility:  Rolling: Supervision  Supine to Sit: Supervision     Scooting: Supervision  Transfers:  Sit to Stand: Stand-by asssistance;Contact guard assistance  Stand to Sit: Stand-by asssistance;Contact guard assistance                       Balance:   Sitting: Intact  Standing: Intact; With support  Ambulation/Gait Training:  Distance (ft): 200 Feet (ft)  Assistive Device: Gait belt;Walker, rolling  Ambulation - Level of Assistance: Contact guard assistance;Assist x1;Additional time; Adaptive equipment     Gait Description (WDL): Exceptions to WDL  Gait Abnormalities: Decreased step clearance  Right Side Weight Bearing: Heel  Left Side Weight Bearing: Heel        Speed/Dari: Pace decreased (<100 feet/min)  Step Length: Left shortened;Right shortened     Functional Measure:  Tinetti test:  Sitting Balance: 1  Arises: 1  Attempts to Rise: 2  Immediate Standing Balance: 1  Standing Balance: 1  Nudged: 1  Eyes Closed: 1  Turn 360 Degrees - Continuous/Discontinuous: 0  Turn 360 Degrees - Steady/Unsteady: 1  Sitting Down: 1  Balance Score: 10  Indication of Gait: 1  R Step Length/Height: 1  L Step Length/Height: 1  R Foot Clearance: 1  L Foot Clearance: 1  Step Symmetry: 1  Step Continuity: 1  Path: 1  Trunk: 0  Walking Time: 0  Gait Score: 8  Total Score: 18         Tinetti Test and G-code impairment scale:  Percentage of Impairment CH     0%    CI     1-19% CJ     20-39% CK     40-59% CL     60-79% CM     80-99% CN      100%   Tinetti  Score 0-28 28 23-27 17-22 12-16 6-11 1-5 0          Tinetti Tool Score Risk of Falls  <19 = High Fall Risk  19-24 = Moderate Fall Risk  25-28 = Low Fall Risk  Tinetti ME. Performance-Oriented Assessment of Mobility Problems in Elderly Patients. [address]; [id number]. (Scoring Description: PT Bulletin Feb. 10, 1993)     Older adults: Consuelo Ackerman et al, 2009; n = 1000 [facility] elderly evaluated with ABC, PAUL, ADL, and IADL)  · Mean PAUL score for males aged 69-68 years = 26.21(3.40)  · Mean PAUL score for females age 69-68 years = 25.16(4.30)  · Mean PAUL score for males over 80 years = 23.29(6.02)  · Mean PAUL score for females over 80 years = 17.20(8.32)            G codes:   In compliance with CMSs Claims Based Outcome Reporting, the following G-code set was chosen for this patient based on their primary functional limitation being treated: The outcome measure chosen to determine the severity of the functional limitation was the Tinetti with a score of 18/28 which was correlated with the impairment scale. · Mobility - Walking and Moving Around:               - CURRENT STATUS:    CJ - 20%-39% impaired, limited or restricted               - GOAL STATUS:           CI - 1%-19% impaired, limited or restricted               - D/C STATUS:                       ---------------To be determined---------------      Physical Therapy Evaluation Charge Determination   History Examination Presentation Decision-Making   MEDIUM  Complexity : 1-2 comorbidities / personal factors will impact the outcome/ POC  HIGH Complexity : 4+ Standardized tests and measures addressing body structure, function, activity limitation and / or participation in recreation  LOW Complexity : Stable, uncomplicated  Other outcome measures Tinetti  HIGH       Based on the above components, the patient evaluation is determined to be of the following complexity level: LOW      Pain:  Pain Scale 1: Numeric (0 - 10)  Pain Intensity 1: 0              Activity Tolerance:   Good - pt without complaints; VSS; no pain  Please refer to the flowsheet for vital signs taken during this treatment. After treatment:   [X]         Patient left in no apparent distress sitting up in chair  [ ]         Patient left in no apparent distress in bed  [X]         Call bell left within reach  [X]         Nursing notified  [ ]         Caregiver present  [ ]         Bed alarm activated      COMMUNICATION/EDUCATION:   The patients plan of care was discussed with: Physical Therapist and Registered Nurse.  [X]         Fall prevention education was provided and the patient/caregiver indicated understanding. [X]         Patient/family have participated as able in goal setting and plan of care.   [X]         Patient/family agree to work toward stated goals and plan of care. [ ]         Patient understands intent and goals of therapy, but is neutral about his/her participation. [ ]         Patient is unable to participate in goal setting and plan of care.      Thank you for this referral.  Mariama Hatch, PT, DPT   Time Calculation: 24 mins

## 2017-01-19 NOTE — PROGRESS NOTES
Bedside and Verbal shift change report given to [address] [place] [address] Eva (oncoming nurse) by Chavo Bowen (offgoing nurse). Report included the following information SBAR, Kardex, Intake/Output and MAR.

## 2017-01-19 NOTE — PROGRESS NOTES
This is a 59 yr old  male who has been admitted due to osteomyelitis in his R great toe, L 4th and 5th toe. Patient is S/P Amputation of the above infected toes. Upon interview patient verified demographics and PCP. Patient stated he uses his walker for mobility and is otherwise independent. Patient has his sister who is currently able to drive him to/from medical appointments. CM to follow should patient have discharge needs. Care Management Interventions  PCP Verified by CM:  Yes  MyChart Signup: No  Discharge Durable Medical Equipment: No (owns a rolling walker)  Physical Therapy Consult: Yes  Occupational Therapy Consult: No  Speech Therapy Consult: No  Current Support Network: Own Home  Confirm Follow Up Transport: Family  Plan discussed with Pt/Family/Caregiver: Yes  Freedom of Choice Offered: Yes     Ex [id number]

## 2017-01-19 NOTE — ROUTINE PROCESS
TRANSFER - IN REPORT:    Verbal report received from little HANLEY(name) on Yissel Hendricks  being received from PACU(unit) for routine post - op      Report consisted of patients Situation, Background, Assessment and   Recommendations(SBAR). Information from the following report(s) SBAR, Kardex, Intake/Output, MAR and Accordion was reviewed with the receiving nurse. Opportunity for questions and clarification was provided. Assessment completed upon patients arrival to unit and care assumed.

## 2017-01-19 NOTE — ANESTHESIA POSTPROCEDURE EVALUATION
Post-Anesthesia Evaluation and Assessment    Patient: Jason Sanchez MRN: [id number]  SSN: [id number]    YOB: 1955  Age: 59 y.o. Sex: male       Cardiovascular Function/Vital Signs  Visit Vitals    /74    Pulse 71    Temp 36.4 °C (97.6 °F)    Resp 12    Ht 5' 10\" (1.778 m)    Wt 95.1 kg (209 lb 10.5 oz)    SpO2 98%    BMI 30.08 kg/m2       Patient is status post general anesthesia for Procedure(s):  AMPUTATION RIGHT GREAT TOE, AMPUTATION LEFT 4TH AND 5TH TOES, WITH INCISION AND DRAINAGE OF ABSCESSES BILATERAL FEET. Nausea/Vomiting: None    Postoperative hydration reviewed and adequate. Pain:  Pain Scale 1: Numeric (0 - 10) (01/18/17 1939)  Pain Intensity 1: 0 (01/18/17 1939)   Managed    Neurological Status:   Neuro (WDL): Exceptions to WDL (01/18/17 1939)  Neuro  LUE Motor Response: Purposeful (01/18/17 1939)  LLE Motor Response: Numbness; Pharmacologically paralyzed (01/18/17 1939)  RUE Motor Response: Purposeful;Spontaneous  (01/18/17 1939)  RLE Motor Response: Numbness; Pharmacologically paralyzed (01/18/17 1939)   At baseline    Mental Status and Level of Consciousness: Arousable    Pulmonary Status:   O2 Device: Room air (01/18/17 1950)   Adequate oxygenation and airway patent    Complications related to anesthesia: None    Post-anesthesia assessment completed.  No concerns    Signed By: Reji Zelaya MD     January 18, 2017

## 2017-01-19 NOTE — BRIEF OP NOTE
BRIEF OPERATIVE NOTE    Date of Procedure: 1/18/2017   Preoperative Diagnosis: Osteomyelitis Right Great Toe and Left Fifth Toe  Postoperative Diagnosis: Osteomyelitis Right Great Toe and Left Fifth Toe    Procedure(s):  AMPUTATION RIGHT GREAT TOE, AMPUTATION LEFT 4TH AND 5TH TOES, WITH INCISION AND DRAINAGE OF ABSCESSES BILATERAL FEET  Surgeon(s) and Role:     * Tayla Fortune DPM - Primary            Surgical Staff:  Circ-1: Perla Quintana RN  Scrub Tech-1: Chico Josue  Float Staff: Janel Lopez III  Event Time In   Incision Start 1818   Incision Close 1931     Anesthesia: General   Estimated Blood Loss: 0mL  Specimens:   ID Type Source Tests Collected by Time Destination   1 : RIGHT GREAT TOE Preservative Toe  Tayla Fortune DPM 1/18/2017 1843 Pathology   2 : LEFT 5TH TOE Preservative Toe  Tayla Fortune DPM 1/18/2017 1843 Pathology   3 : LEFT 4TH TOE Preservative Toe  Tayla Fortune DPM 1/18/2017 1913 Pathology   1 : ABSCESS RIGHT GREAT TOE Wound Toe CULTURE, ANAEROBIC, CULTURE, WOUND [place], [place] 1/18/2017 1826 Microbiology   2 : ABSCESS LEFT FOOT Wound Foot, left CULTURE, ANAEROBIC, CULTURE, WOUND W [place], [place] 1/18/2017 1827 Microbiology      Findings: right great toe abscess and osteomyelitis, left 4th and 5th toe and metatarsal abscess and  Osteomyelitis.     Complications: None  Implants: * No implants in log *

## 2017-01-20 ENCOUNTER — APPOINTMENT (OUTPATIENT)
Dept: GENERAL RADIOLOGY | Age: 62
DRG: 617 | End: 2017-01-20
Attending: PODIATRIST
Payer: COMMERCIAL

## 2017-01-20 LAB
ANION GAP BLD CALC-SCNC: 10 MMOL/L (ref 5–15)
BACTERIA SPEC CULT: NORMAL
BUN SERPL-MCNC: 8 MG/DL (ref 6–20)
BUN/CREAT SERPL: 11 (ref 12–20)
CALCIUM SERPL-MCNC: 8 MG/DL (ref 8.5–10.1)
CHLORIDE SERPL-SCNC: 105 MMOL/L (ref 97–108)
CO2 SERPL-SCNC: 23 MMOL/L (ref 21–32)
CREAT SERPL-MCNC: 0.71 MG/DL (ref 0.7–1.3)
DATE LAST DOSE: ABNORMAL
GLUCOSE BLD STRIP.AUTO-MCNC: 143 MG/DL (ref 65–100)
GLUCOSE BLD STRIP.AUTO-MCNC: 205 MG/DL (ref 65–100)
GLUCOSE BLD STRIP.AUTO-MCNC: 261 MG/DL (ref 65–100)
GLUCOSE BLD STRIP.AUTO-MCNC: 331 MG/DL (ref 65–100)
GLUCOSE SERPL-MCNC: 171 MG/DL (ref 65–100)
GRAM STN SPEC: NORMAL
POTASSIUM SERPL-SCNC: 3.8 MMOL/L (ref 3.5–5.1)
REPORTED DOSE,DOSE: ABNORMAL UNITS
REPORTED DOSE/TIME,TMG: 2100
SERVICE CMNT-IMP: ABNORMAL
SERVICE CMNT-IMP: NORMAL
SODIUM SERPL-SCNC: 138 MMOL/L (ref 136–145)
VANCOMYCIN TROUGH SERPL-MCNC: 11.3 UG/ML (ref 5–10)

## 2017-01-20 PROCEDURE — 97116 GAIT TRAINING THERAPY: CPT

## 2017-01-20 PROCEDURE — 36415 COLL VENOUS BLD VENIPUNCTURE: CPT | Performed by: PODIATRIST

## 2017-01-20 PROCEDURE — 74011250637 HC RX REV CODE- 250/637: Performed by: PODIATRIST

## 2017-01-20 PROCEDURE — 80202 ASSAY OF VANCOMYCIN: CPT | Performed by: PODIATRIST

## 2017-01-20 PROCEDURE — 74011250636 HC RX REV CODE- 250/636: Performed by: PODIATRIST

## 2017-01-20 PROCEDURE — 80048 BASIC METABOLIC PNL TOTAL CA: CPT | Performed by: PODIATRIST

## 2017-01-20 PROCEDURE — 73620 X-RAY EXAM OF FOOT: CPT

## 2017-01-20 PROCEDURE — 74011000258 HC RX REV CODE- 258: Performed by: PODIATRIST

## 2017-01-20 PROCEDURE — 65270000029 HC RM PRIVATE

## 2017-01-20 PROCEDURE — 82962 GLUCOSE BLOOD TEST: CPT

## 2017-01-20 RX ADMIN — THERA TABS 1 TABLET: TAB at 09:00

## 2017-01-20 RX ADMIN — PIPERACILLIN AND TAZOBACTAM 3.38 G: 3; .375 INJECTION, POWDER, FOR SOLUTION INTRAVENOUS at 18:04

## 2017-01-20 RX ADMIN — Medication 10 ML: at 21:45

## 2017-01-20 RX ADMIN — Medication 10 ML: at 08:45

## 2017-01-20 RX ADMIN — VANCOMYCIN HYDROCHLORIDE 1500 MG: 10 INJECTION, POWDER, LYOPHILIZED, FOR SOLUTION INTRAVENOUS at 10:22

## 2017-01-20 RX ADMIN — PIPERACILLIN AND TAZOBACTAM 3.38 G: 3; .375 INJECTION, POWDER, FOR SOLUTION INTRAVENOUS at 12:29

## 2017-01-20 RX ADMIN — VANCOMYCIN HYDROCHLORIDE 1500 MG: 10 INJECTION, POWDER, LYOPHILIZED, FOR SOLUTION INTRAVENOUS at 21:45

## 2017-01-20 RX ADMIN — GLYBURIDE 5 MG: 5 TABLET ORAL at 09:54

## 2017-01-20 RX ADMIN — Medication 10 ML: at 15:45

## 2017-01-20 RX ADMIN — DOCUSATE SODIUM 100 MG: 100 CAPSULE, LIQUID FILLED ORAL at 09:54

## 2017-01-20 RX ADMIN — PRAVASTATIN SODIUM 20 MG: 10 TABLET ORAL at 21:45

## 2017-01-20 RX ADMIN — GLYBURIDE 5 MG: 5 TABLET ORAL at 21:45

## 2017-01-20 RX ADMIN — GLYBURIDE 5 MG: 5 TABLET ORAL at 15:45

## 2017-01-20 RX ADMIN — LISINOPRIL 5 MG: 5 TABLET ORAL at 09:00

## 2017-01-20 NOTE — PROGRESS NOTES
Pharmacy Automatic Renal Dosing Protocol - Antimicrobials    Indication for Antimicrobials: Osteomyelitis Right Great Toe & Left 4th &5th toes, s/p amputations      Current Regimen of Each Antimicrobial (Start Day & Day of Therapy):  Vancomycin 2.25gm IV x1 () then 1.5gm IV q12h; start ; Day #4  Zosyn 2.25 g IV every 8 hours ( Day #1)    Goal Level (if needed): 15-20 mcg/mL  Measured / Extrapolated Levels (if drawn):   17 @ 10:20- 11.3 mcg/mL (extrapolated true trough of 12.8 mcg/mL)        Significant Cultures:   : Blood: ng x 3 days (pending)  : Toe Wound: Strep B-hemo B(pending)  : Toe Wound-Anaerobic: NGTD x 13 hours (pending)  : Foot Wound: NGTD x 13 hours (pending)  : Foot Wound-Anaerobic: (pending)  CAPD, Hemodialysis or Renal Replacement Therapy: None   Paralysis, amputations, malnutrition: None significant (toes)  Recent Labs      17   0411  17   0416  17   1319   CREA  0.71  0.95  1.08   BUN  8  10  15   WBC   --   9.1  9.9     Temp (24hrs), Av.5 °F (36.9 °C), Min:98 °F (36.7 °C), Max:98.9 °F (37.2 °C)    Creatinine Clearance (Creatinine Clearance (ml/min)): > 100 mL/min     Impression/Plan: Will change vancomycin to 1750 mg IV every 12 hours for an estimated trough of 14.9 mcg/mL with room for accumulation. Will change Zosyn to 3.375 g IV every 6 hours for CrCl 3.375 g IV every 6 hours for CrCl > 40 mL/min per renal dosing protocol. Pharmacy will follow daily and adjust as appropriate.     Thank you,  Gladis Miller, PHARMD

## 2017-01-20 NOTE — PROGRESS NOTES
Problem: Mobility Impaired (Adult and Pediatric)  Goal: *Acute Goals and Plan of Care (Insert Text)  Physical Therapy Goals  Initiated 1/19/2017  1. Patient will move from supine to sit and sit to supine , scoot up and down and roll side to side in bed with independence within 7 day(s). 2. Patient will transfer from bed to chair and chair to bed with modified independence using the least restrictive device within 7 day(s). 3. Patient will perform sit to stand with independence within 7 day(s). 4. Patient will ambulate with modified independence for 250 feet with the least restrictive device within 7 day(s). 5. Patient will ascend/descend 17 stairs with 1 handrail(s) with modified independence within 7 day(s). PHYSICAL THERAPY TREATMENT  Patient: Lissette Bartholomew (87 y.o. male)  Date: 1/20/2017  Diagnosis: Osteomyelitis of right foot (HCC)  Osteomyelitis of left foot (HCC)  Diabetes mellitus with neuropathy (HCC)  Osteomyelitis Right Great Toe and Left Fifth Toe <principal problem not specified>  Procedure(s) (LRB):  AMPUTATION RIGHT GREAT TOE, AMPUTATION LEFT 4TH AND 5TH TOES, WITH INCISION AND DRAINAGE OF ABSCESSES BILATERAL FEET (Bilateral) 2 Days Post-Op  Precautions: Fall (Heel WB SAUL)      ASSESSMENT:  Patient progressing well with functional mobility training. He verbalizes understanding of heel WB during gait and follows most of the time. Discussed mobility at home and encouraged gait but followed by foot checks d/t lack of sensation. He completed stairs safely with therapy and is clear for discharge when medically stable. He will need a RW for home use but no other home services recommended. Patient is clear for discharge with a rolling walker       PLAN:  Patient continues to benefit from skilled intervention to address the above impairments. Continue treatment per established plan of care.   Discharge Recommendations:  None  Further Equipment Recommendations for Discharge:  rolling walker SUBJECTIVE:   Patient stated I can't feel my feet but I have had some phantom pain.       OBJECTIVE DATA SUMMARY:   Critical Behavior:  Neurologic State: Appropriate for age  Orientation Level: Oriented X4  Cognition: Appropriate decision making     Functional Mobility Training:  Bed Mobility:  Rolling: Supervision                 Transfers:  Sit to Stand: Stand-by asssistance                                Balance:  Sitting: Intact  Standing: Intact; With support  Ambulation/Gait Training:  Distance (ft): 250 Feet (ft)  Assistive Device: Gait belt;Walker, rolling  Ambulation - Level of Assistance: Stand-by asssistance        Gait Abnormalities: Decreased step clearance  Right Side Weight Bearing: Heel  Left Side Weight Bearing: Heel        Speed/Dari: Pace decreased (<100 feet/min)                                Cued for scapular depression and erect posture  Stairs:  Number of Stairs Trained: 8  Stairs - Level of Assistance: Modified independent              Rail Use: Right      Pain:  Pain Scale 1: Numeric (0 - 10)  Pain Intensity 1: 0  Pain Location 1: Foot     After treatment:   [X]    Patient left in no apparent distress sitting up in chair  [ ]    Patient left in no apparent distress in bed  [X]    Call bell left within reach  [X]    Nursing notified  [ ]    Caregiver present  [ ]    Bed alarm activated      COMMUNICATION/COLLABORATION:   The patients plan of care was discussed with: Registered Nurse     Ramiro Hernandez, PT, DPT   Time Calculation: 15 mins

## 2017-01-20 NOTE — ROUTINE PROCESS
1/20/2017  8:25 AM  Bedside and Verbal shift change report given to Emigdio Rizzo (oncoming nurse) by Almeda Gosselin RN (offgoing nurse). Report included the following information SBAR, Kardex, OR Summary, Procedure Summary, Intake/Output, MAR, Accordion, Recent Results and Med Rec Status.

## 2017-01-20 NOTE — PROGRESS NOTES
Bedside and Verbal shift change report given to Christofer Morgan (oncoming nurse) by Rosslyn Gosselin (offgoing nurse). Report included the following information SBAR, Kardex, Intake/Output, MAR and Recent Results.

## 2017-01-21 VITALS
WEIGHT: 209 LBS | SYSTOLIC BLOOD PRESSURE: 155 MMHG | DIASTOLIC BLOOD PRESSURE: 75 MMHG | BODY MASS INDEX: 29.92 KG/M2 | TEMPERATURE: 98.2 F | RESPIRATION RATE: 18 BRPM | OXYGEN SATURATION: 96 % | HEIGHT: 70 IN | HEART RATE: 64 BPM

## 2017-01-21 LAB
ANION GAP BLD CALC-SCNC: 10 MMOL/L (ref 5–15)
BUN SERPL-MCNC: 8 MG/DL (ref 6–20)
BUN/CREAT SERPL: 10 (ref 12–20)
CALCIUM SERPL-MCNC: 8.4 MG/DL (ref 8.5–10.1)
CHLORIDE SERPL-SCNC: 105 MMOL/L (ref 97–108)
CO2 SERPL-SCNC: 23 MMOL/L (ref 21–32)
CREAT SERPL-MCNC: 0.81 MG/DL (ref 0.7–1.3)
ERYTHROCYTE [DISTWIDTH] IN BLOOD BY AUTOMATED COUNT: 13.2 % (ref 11.5–14.5)
GLUCOSE BLD STRIP.AUTO-MCNC: 184 MG/DL (ref 65–100)
GLUCOSE SERPL-MCNC: 238 MG/DL (ref 65–100)
HCT VFR BLD AUTO: 38.5 % (ref 36.6–50.3)
HGB BLD-MCNC: 12.7 G/DL (ref 12.1–17)
MCH RBC QN AUTO: 29.2 PG (ref 26–34)
MCHC RBC AUTO-ENTMCNC: 33 G/DL (ref 30–36.5)
MCV RBC AUTO: 88.5 FL (ref 80–99)
PLATELET # BLD AUTO: 305 K/UL (ref 150–400)
POTASSIUM SERPL-SCNC: 3.9 MMOL/L (ref 3.5–5.1)
RBC # BLD AUTO: 4.35 M/UL (ref 4.1–5.7)
SERVICE CMNT-IMP: ABNORMAL
SODIUM SERPL-SCNC: 138 MMOL/L (ref 136–145)
WBC # BLD AUTO: 7.1 K/UL (ref 4.1–11.1)

## 2017-01-21 PROCEDURE — 74011250636 HC RX REV CODE- 250/636: Performed by: PODIATRIST

## 2017-01-21 PROCEDURE — 80048 BASIC METABOLIC PNL TOTAL CA: CPT | Performed by: PODIATRIST

## 2017-01-21 PROCEDURE — 74011250637 HC RX REV CODE- 250/637: Performed by: PODIATRIST

## 2017-01-21 PROCEDURE — 82962 GLUCOSE BLOOD TEST: CPT

## 2017-01-21 PROCEDURE — 85027 COMPLETE CBC AUTOMATED: CPT | Performed by: PODIATRIST

## 2017-01-21 PROCEDURE — 74011000258 HC RX REV CODE- 258: Performed by: PODIATRIST

## 2017-01-21 RX ORDER — SULFAMETHOXAZOLE AND TRIMETHOPRIM 800; 160 MG/1; MG/1
1 TABLET ORAL 2 TIMES DAILY
Qty: 42 TAB | Refills: 0 | Status: SHIPPED | OUTPATIENT
Start: 2017-01-21 | End: 2017-02-11

## 2017-01-21 RX ORDER — OXYCODONE AND ACETAMINOPHEN 5; 325 MG/1; MG/1
1-2 TABLET ORAL
Qty: 40 TAB | Refills: 0 | Status: SHIPPED | OUTPATIENT
Start: 2017-01-21 | End: 2018-10-19

## 2017-01-21 RX ADMIN — PIPERACILLIN AND TAZOBACTAM 3.38 G: 3; .375 INJECTION, POWDER, FOR SOLUTION INTRAVENOUS at 06:34

## 2017-01-21 RX ADMIN — THERA TABS 1 TABLET: TAB at 09:24

## 2017-01-21 RX ADMIN — VANCOMYCIN HYDROCHLORIDE 1500 MG: 10 INJECTION, POWDER, LYOPHILIZED, FOR SOLUTION INTRAVENOUS at 09:23

## 2017-01-21 RX ADMIN — LISINOPRIL 5 MG: 5 TABLET ORAL at 09:24

## 2017-01-21 RX ADMIN — GLYBURIDE 5 MG: 5 TABLET ORAL at 09:24

## 2017-01-21 RX ADMIN — DOCUSATE SODIUM 100 MG: 100 CAPSULE, LIQUID FILLED ORAL at 09:24

## 2017-01-21 RX ADMIN — PIPERACILLIN AND TAZOBACTAM 3.38 G: 3; .375 INJECTION, POWDER, FOR SOLUTION INTRAVENOUS at 00:00

## 2017-01-21 RX ADMIN — Medication 10 ML: at 06:34

## 2017-01-21 NOTE — PROGRESS NOTES
Care Management Interventions  PCP Verified by CM:  Yes  Last Visit to PCP: 10/21/17  Palliative Care Consult (Criteria: CHF and RRAT>21): No  Mode of Transport at Discharge:  (pt was transported by his sister to the hospital.)  Transition of Care Consult (CM Consult): Home Health (CM submitted referral for [address])  [facility] - INPATIENT: No  Reason Outside [place]: Out of service area (Patient perferred [address] [facility] [address]  MyChart Signup: No  Discharge Durable Medical Equipment: No  Physical Therapy Consult: No  Occupational Therapy Consult: No  Speech Therapy Consult: No  Current Support Network: Lives Alone (Patient lives alone.)  Confirm Follow Up Transport:  (Patient will be transported home by his sister. )  Plan discussed with Pt/Family/Caregiver: Yes (CM discussed discharge planning with patient. )  Freedom of Choice Offered: Yes (Patient signed [facility] form. )  Discharge Location  Discharge Placement: Home     BIENVENIDO Ferguson   Weekend ex [id number]

## 2017-01-21 NOTE — DISCHARGE SUMMARY
Kurt Prudencio   [place], [facility]   DISCHARGE SUMMARY       Name:  Mary Burton   MR#:  [id number]   :  1955   Account #:  [de-identified]        Date of Adm:  2017       ADMITTING PHYSICIAN: Ted Chiu DPM.     ADMITTING DIAGNOSIS: Osteomyelitis of right great toe and left   fourth and fifth rays. HISTORY OF PRESENT ILLNESS: This is a 75-year-old diabetic male   who is admitted to [facility] on 2017   for a diabetic foot infection involving both feet, in particular the right   great toe and the left fourth and fifth rays. PAST MEDICAL HISTORY: Positive for diabetes, hypertension. PAST SURGICAL HISTORY: Positive for history of orthopedic surgery. SOCIAL HISTORY: No tobacco, drug abuse. MEDICATIONS PRIOR TO ADMISSION: Include      1. Pravastatin. 2. Lisinopril. 3. Saxagliptin-metformin. 4. Glyburide. 5. Aspirin. 6. Multivitamin. ALLERGIES: NO KNOWN DRUG ALLERGIES. REVIEW OF SYSTEMS   Negative except for what was mentioned in the HPI. HOSPITAL COURSE: As previously mentioned, the patient was   admitted under our service on 2017. Orders were placed for IV   antibiotics consisting of vancomycin and Zosyn. The patient was   started on diabetic diet and orders were placed for baseline lab work. On 2017, the patient was taken to the operating room by Dr. Nick Gutierrez for an amputation of the right great toe as well as partial   amputation of left fourth and fifth rays. Cultures and specimens were   sent at that time. The patient tolerated the procedure well. Overall, the patient's hospital stay was unremarkable. Final cultures   showed coag-negative staph. The patient's surrounding cellulitis   greatly improved postoperatively. On 2017, the patient was   evaluated by myself and was found to be doing very well.  The decision   was made to discharge the patient to home. After consulting with   infectious disease doctor, the decision was made to discharge the   patient on oral Bactrim-DS twice a day for 4 weeks. The patient will   follow up with Dr. Ti Mei in our office next week. He will need a   consult placed by case management services for home wound care.         KRISTEL Chamberlain / Silvano Gillette   D:  01/21/2017   07:53   T:  01/21/2017   08:22   Job #:  [id number]

## 2017-01-21 NOTE — DISCHARGE INSTRUCTIONS
Follow up with Dr. Jolanta Schmidt (Foot Doc [phone]) in one week. Wound Care: please pack right and left foot wounds with 1/4\" plain packing and dress feet with gauze dressing every other day. Activity: weightbear as tolerated to both feet using surgical shoes.   Elevate feet when at rest.

## 2017-01-21 NOTE — PROGRESS NOTES
CM submitted referral for RN Wound Care to [facility], and was informed that they do not accept   [facility]. CM submitted referral   To [facility] today. CM was informed by Mehrdad Major that they will follow up with specialist on Monday.     BIENVENIDO King Weekend  Ex [id number]

## 2017-01-21 NOTE — PROGRESS NOTES
Progress Note    Patient: Srinivas Ghosh MRN: [id number]  SSN: [id number]    YOB: 1955  Age: 59 y.o. Sex: male      Admit Date: 2017  3 Days Post-Op     Procedure:   Procedure(s):  AMPUTATION RIGHT GREAT TOE, AMPUTATION LEFT 4TH AND 5TH TOES, WITH INCISION AND DRAINAGE OF ABSCESSES BILATERAL FEET    Subjective:     Patient seen & examined at bedside. Denies any n/v/f/ns/c. Pt in good spirits. Objective:     Visit Vitals    /67 (BP 1 Location: Right arm)    Pulse 62    Temp 98.5 °F (36.9 °C)    Resp 16    Ht 5' 10\" (1.778 m)    Wt 94.8 kg (209 lb)    SpO2 95%    BMI 29.99 kg/m2      Right Foot Exam: incision intact with suture and dorsal wound packed open. No pus, no malodor, +localized edema. No erythema. Foot warm to touch. Left Foot Exam: incision intact with suture proximally and distally. Central aspect packed open. No pus, no malodor, +localized edema. Foot warm to touch. Labs/Radiology Review: images and reports reviewed    Assessment:     Hospital Problems  Never Reviewed          Codes Class Noted POA    Diabetes mellitus with neuropathy ([facility] ICD-10-CM: E11.40  ICD-9-CM: 250.60, 357.2  2017 Unknown        Osteomyelitis of left foot ([facility] ICD-10-CM: M86.9  ICD-9-CM: 730.27  2017 Unknown        Osteomyelitis of right foot [facility]) ICD-10-CM: M86.9  ICD-9-CM: 730.27  2017 Unknown              Plan/Recommendations/Medical Decision Makin.) New DSD/packing/ace applied to both feet. 2.) Overall, Pt doing very well. Will d/c Pt to home today. Pt to be on Bactrim DS BID for 4 weeks. Pt to be wbat with sx shoes to both feet. 3.) Pt will need home wound care. Will consult case management. Pt to follow up with Dr. Chastity Lezama in the office next week. All follow up info and wound care instructions in d/c paperwork. 4.) Appreciate all input. D/C summary dictated.  Job# [id number]    Signed By: Issac Singh DPM     2017

## 2017-01-21 NOTE — PROGRESS NOTES
Bedside and Verbal shift change report given to Pete Maciel Rn  (oncoming nurse) by Alirio Arambula  (offgoing nurse). Report included the following information SBAR, Kardex, ED Summary, OR Summary, Procedure Summary, Intake/Output, MAR and Recent Results.

## 2017-01-21 NOTE — PROGRESS NOTES
Reviewed discharge instructions, prescriptions, and side effects with patient and friend. Reviewed wound care, follow-up instructions, and medication instructions. Answered all questions and provided contact information for future questions. Took IV out per policy, Catheter tip intact. Going to sister's home in car with home health.

## 2017-01-23 ENCOUNTER — HOME HEALTH ADMISSION (OUTPATIENT)
Dept: HOME HEALTH SERVICES | Facility: HOME HEALTH | Age: 62
End: 2017-01-23
Payer: COMMERCIAL

## 2017-01-23 ENCOUNTER — HOME CARE VISIT (OUTPATIENT)
Dept: SCHEDULING | Facility: HOME HEALTH | Age: 62
End: 2017-01-23
Payer: COMMERCIAL

## 2017-01-23 VITALS
DIASTOLIC BLOOD PRESSURE: 78 MMHG | OXYGEN SATURATION: 97 % | SYSTOLIC BLOOD PRESSURE: 130 MMHG | HEART RATE: 76 BPM | RESPIRATION RATE: 18 BRPM | TEMPERATURE: 98.4 F

## 2017-01-23 PROCEDURE — G0299 HHS/HOSPICE OF RN EA 15 MIN: HCPCS

## 2017-01-24 ENCOUNTER — HOME CARE VISIT (OUTPATIENT)
Dept: SCHEDULING | Facility: HOME HEALTH | Age: 62
End: 2017-01-24
Payer: COMMERCIAL

## 2017-01-24 VITALS
DIASTOLIC BLOOD PRESSURE: 60 MMHG | TEMPERATURE: 98 F | SYSTOLIC BLOOD PRESSURE: 120 MMHG | OXYGEN SATURATION: 97 % | HEART RATE: 89 BPM | RESPIRATION RATE: 18 BRPM

## 2017-01-24 LAB
BACTERIA SPEC CULT: NORMAL
BACTERIA SPEC CULT: NORMAL
SERVICE CMNT-IMP: NORMAL
SERVICE CMNT-IMP: NORMAL

## 2017-01-24 PROCEDURE — G0299 HHS/HOSPICE OF RN EA 15 MIN: HCPCS

## 2017-01-25 ENCOUNTER — HOME CARE VISIT (OUTPATIENT)
Dept: SCHEDULING | Facility: HOME HEALTH | Age: 62
End: 2017-01-25
Payer: COMMERCIAL

## 2017-01-25 VITALS
SYSTOLIC BLOOD PRESSURE: 112 MMHG | DIASTOLIC BLOOD PRESSURE: 64 MMHG | OXYGEN SATURATION: 98 % | TEMPERATURE: 97.9 F | HEART RATE: 84 BPM

## 2017-01-25 PROCEDURE — G0300 HHS/HOSPICE OF LPN EA 15 MIN: HCPCS

## 2017-01-26 ENCOUNTER — HOME CARE VISIT (OUTPATIENT)
Dept: HOME HEALTH SERVICES | Facility: HOME HEALTH | Age: 62
End: 2017-01-26
Payer: COMMERCIAL

## 2017-01-27 ENCOUNTER — HOME CARE VISIT (OUTPATIENT)
Dept: SCHEDULING | Facility: HOME HEALTH | Age: 62
End: 2017-01-27
Payer: COMMERCIAL

## 2017-01-27 PROCEDURE — G0299 HHS/HOSPICE OF RN EA 15 MIN: HCPCS

## 2017-01-28 ENCOUNTER — HOME CARE VISIT (OUTPATIENT)
Dept: SCHEDULING | Facility: HOME HEALTH | Age: 62
End: 2017-01-28
Payer: COMMERCIAL

## 2017-01-28 VITALS
TEMPERATURE: 97.5 F | SYSTOLIC BLOOD PRESSURE: 122 MMHG | RESPIRATION RATE: 16 BRPM | OXYGEN SATURATION: 98 % | HEART RATE: 77 BPM | DIASTOLIC BLOOD PRESSURE: 76 MMHG

## 2017-01-28 PROCEDURE — G0300 HHS/HOSPICE OF LPN EA 15 MIN: HCPCS

## 2017-01-29 ENCOUNTER — HOME CARE VISIT (OUTPATIENT)
Dept: SCHEDULING | Facility: HOME HEALTH | Age: 62
End: 2017-01-29
Payer: COMMERCIAL

## 2017-01-29 VITALS
SYSTOLIC BLOOD PRESSURE: 120 MMHG | OXYGEN SATURATION: 98 % | RESPIRATION RATE: 16 BRPM | DIASTOLIC BLOOD PRESSURE: 70 MMHG | HEART RATE: 83 BPM | TEMPERATURE: 97.8 F

## 2017-01-29 PROCEDURE — G0300 HHS/HOSPICE OF LPN EA 15 MIN: HCPCS

## 2017-01-30 ENCOUNTER — HOME CARE VISIT (OUTPATIENT)
Dept: HOME HEALTH SERVICES | Facility: HOME HEALTH | Age: 62
End: 2017-01-30
Payer: COMMERCIAL

## 2017-01-30 VITALS
DIASTOLIC BLOOD PRESSURE: 62 MMHG | HEART RATE: 70 BPM | RESPIRATION RATE: 16 BRPM | TEMPERATURE: 98.2 F | SYSTOLIC BLOOD PRESSURE: 122 MMHG | OXYGEN SATURATION: 98 %

## 2017-01-31 ENCOUNTER — HOME CARE VISIT (OUTPATIENT)
Dept: SCHEDULING | Facility: HOME HEALTH | Age: 62
End: 2017-01-31
Payer: COMMERCIAL

## 2017-01-31 VITALS
OXYGEN SATURATION: 98 % | DIASTOLIC BLOOD PRESSURE: 68 MMHG | HEART RATE: 89 BPM | SYSTOLIC BLOOD PRESSURE: 132 MMHG | TEMPERATURE: 98.3 F

## 2017-01-31 PROCEDURE — G0300 HHS/HOSPICE OF LPN EA 15 MIN: HCPCS

## 2017-02-01 ENCOUNTER — HOME CARE VISIT (OUTPATIENT)
Dept: HOME HEALTH SERVICES | Facility: HOME HEALTH | Age: 62
End: 2017-02-01
Payer: COMMERCIAL

## 2017-02-01 ENCOUNTER — HOME CARE VISIT (OUTPATIENT)
Dept: SCHEDULING | Facility: HOME HEALTH | Age: 62
End: 2017-02-01
Payer: COMMERCIAL

## 2017-03-03 ENCOUNTER — ANESTHESIA EVENT (OUTPATIENT)
Dept: SURGERY | Age: 62
End: 2017-03-03
Payer: COMMERCIAL

## 2017-03-03 NOTE — PERIOP NOTES
[facility]  Ambulatory Surgery Unit  Pre-operative Instructions    Surgery/Procedure Date  3/6/17            Tentative Arrival Time 0945      1. On the day of your surgery/procedure, please report to the Ambulatory Surgery Unit Registration Desk and sign in at your designated time. The Ambulatory Surgery Unit is located in [facility] on the [facility] side of the [facility] across from the [address] [place] [address]. Please have all of your health insurance cards and a photo ID. 2. You must have someone with you to drive you home, as you should not drive a car for 24 hours following anesthesia. Please make arrangements for a responsible adult friend or family member to stay with you for at least the first 24 hours after your surgery. 3. Do not have anything to eat or drink (including water, gum, mints, coffee, juice) after midnight   3/5/17. This may not apply to medications prescribed by your physician. (Please note below the special instructions with medications to take the morning of surgery, if applicable.)    4. We recommend you do not drink any alcoholic beverages for 24 hours before and after your surgery. 5. Stop all Aspirin, non-steroidal anti-inflammatory drugs (i.e. Advil, Aleve), vitamins, and supplements as directed by your surgeon's office. **If you are currently taking Plavix, Coumadin, or other blood-thinning agents, contact your surgeon for instructions. **    6. In an effort to help prevent surgical site infection, we ask that you shower with an anti-bacterial soap (i.e. Dial or Safeguard) for 3 days prior to and on the morning of surgery, using a fresh towel after each shower. (Please begin this process with fresh bed linens.) Do not apply any lotions, powders, or deodorants after the shower on the day of your procedure. If applicable, please do not shave the operative site for 48 hours prior to surgery. 7. Wear comfortable clothes. Wear glasses instead of contacts. Do not bring any jewelry or money (other than copays or fees as instructed). Do not wear make-up, particularly mascara, the morning of your surgery. Do not wear nail polish, particularly if you are having foot /hand surgery. Wear your hair loose or down, no ponytails, buns, tor pins or clips. All body piercings must be removed. 8. You should understand that if you do not follow these instructions your surgery may be cancelled. If your physical condition changes (i.e. fever, cold or flu) please contact your surgeon as soon as possible. 9. It is important that you be on time. If a situation occurs where you may be late, or if you have any questions or problems, please call [phone].    10. Your surgery time may be subject to change. You will receive a phone call the day prior to surgery to confirm your arrival time. 11. Pediatric patients: please bring a change of clothes, diapers, bottle/sippy cup, pacifier, etc.      Special Instructions:    MEDICATIONS TO TAKE THE MORNING OF SURGERY WITH A SIP OF WATER: none      I understand a pre-operative phone call will be made to verify my surgery time. In the event that I am not available, I give permission for a message to be left on my answering service and/or with another person?       Yes     (instructions given verbally during phone assessment- pt voiced understanding)     ___________________      ___________________      ________________  (Signature of Patient)          (Witness)                   (Date and Time)

## 2017-03-06 ENCOUNTER — ANESTHESIA (OUTPATIENT)
Dept: SURGERY | Age: 62
End: 2017-03-06
Payer: COMMERCIAL

## 2017-03-06 ENCOUNTER — SURGERY (OUTPATIENT)
Age: 62
End: 2017-03-06

## 2017-03-06 ENCOUNTER — HOSPITAL ENCOUNTER (OUTPATIENT)
Age: 62
Setting detail: OUTPATIENT SURGERY
Discharge: HOME | End: 2017-03-06
Attending: PODIATRIST | Admitting: PODIATRIST
Payer: COMMERCIAL

## 2017-03-06 VITALS
HEIGHT: 70 IN | DIASTOLIC BLOOD PRESSURE: 58 MMHG | RESPIRATION RATE: 18 BRPM | WEIGHT: 211 LBS | TEMPERATURE: 97.4 F | SYSTOLIC BLOOD PRESSURE: 121 MMHG | BODY MASS INDEX: 30.21 KG/M2 | OXYGEN SATURATION: 95 % | HEART RATE: 69 BPM

## 2017-03-06 LAB
GLUCOSE BLD STRIP.AUTO-MCNC: 150 MG/DL (ref 65–100)
SERVICE CMNT-IMP: ABNORMAL

## 2017-03-06 PROCEDURE — 77030002916 HC SUT ETHLN J&J -A: Performed by: PODIATRIST

## 2017-03-06 PROCEDURE — 74011250636 HC RX REV CODE- 250/636

## 2017-03-06 PROCEDURE — 87186 SC STD MICRODIL/AGAR DIL: CPT | Performed by: PODIATRIST

## 2017-03-06 PROCEDURE — 87077 CULTURE AEROBIC IDENTIFY: CPT | Performed by: PODIATRIST

## 2017-03-06 PROCEDURE — 87205 SMEAR GRAM STAIN: CPT | Performed by: PODIATRIST

## 2017-03-06 PROCEDURE — 76210000040 HC AMBSU PH I REC FIRST 0.5 HR: Performed by: PODIATRIST

## 2017-03-06 PROCEDURE — 76210000046 HC AMBSU PH II REC FIRST 0.5 HR: Performed by: PODIATRIST

## 2017-03-06 PROCEDURE — 74011250636 HC RX REV CODE- 250/636: Performed by: ANESTHESIOLOGY

## 2017-03-06 PROCEDURE — 77030012961 HC IRR KT CYSTO/TUR ICUM -A: Performed by: PODIATRIST

## 2017-03-06 PROCEDURE — 77030018836 HC SOL IRR NACL ICUM -A: Performed by: PODIATRIST

## 2017-03-06 PROCEDURE — 76060000062 HC AMB SURG ANES 1 TO 1.5 HR: Performed by: PODIATRIST

## 2017-03-06 PROCEDURE — 87075 CULTR BACTERIA EXCEPT BLOOD: CPT | Performed by: PODIATRIST

## 2017-03-06 PROCEDURE — 76030000001 HC AMB SURG OR TIME 1 TO 1.5: Performed by: PODIATRIST

## 2017-03-06 PROCEDURE — 82962 GLUCOSE BLOOD TEST: CPT

## 2017-03-06 PROCEDURE — 77030006788 HC BLD SAW OSC STRY -B: Performed by: PODIATRIST

## 2017-03-06 PROCEDURE — 74011250636 HC RX REV CODE- 250/636: Performed by: PODIATRIST

## 2017-03-06 PROCEDURE — 77030020255 HC SOL INJ LR 1000ML BG: Performed by: PODIATRIST

## 2017-03-06 PROCEDURE — 77030028224 HC PDNG CST BSNM -A: Performed by: PODIATRIST

## 2017-03-06 PROCEDURE — 77030011640 HC PAD GRND REM COVD -A: Performed by: PODIATRIST

## 2017-03-06 PROCEDURE — 77030031139 HC SUT VCRL2 J&J -A: Performed by: PODIATRIST

## 2017-03-06 PROCEDURE — 74011000250 HC RX REV CODE- 250

## 2017-03-06 PROCEDURE — 88311 DECALCIFY TISSUE: CPT | Performed by: PODIATRIST

## 2017-03-06 RX ORDER — MIDAZOLAM HYDROCHLORIDE 1 MG/ML
INJECTION, SOLUTION INTRAMUSCULAR; INTRAVENOUS AS NEEDED
Status: DISCONTINUED | OUTPATIENT
Start: 2017-03-06 | End: 2017-03-06 | Stop reason: HOSPADM

## 2017-03-06 RX ORDER — ONDANSETRON 2 MG/ML
4 INJECTION INTRAMUSCULAR; INTRAVENOUS AS NEEDED
Status: DISCONTINUED | OUTPATIENT
Start: 2017-03-06 | End: 2017-03-06 | Stop reason: HOSPADM

## 2017-03-06 RX ORDER — PROPOFOL 10 MG/ML
INJECTION, EMULSION INTRAVENOUS
Status: DISCONTINUED | OUTPATIENT
Start: 2017-03-06 | End: 2017-03-06 | Stop reason: HOSPADM

## 2017-03-06 RX ORDER — DIPHENHYDRAMINE HYDROCHLORIDE 50 MG/ML
12.5 INJECTION, SOLUTION INTRAMUSCULAR; INTRAVENOUS AS NEEDED
Status: DISCONTINUED | OUTPATIENT
Start: 2017-03-06 | End: 2017-03-06 | Stop reason: HOSPADM

## 2017-03-06 RX ORDER — SODIUM CHLORIDE 0.9 % (FLUSH) 0.9 %
5-10 SYRINGE (ML) INJECTION AS NEEDED
Status: DISCONTINUED | OUTPATIENT
Start: 2017-03-06 | End: 2017-03-06 | Stop reason: HOSPADM

## 2017-03-06 RX ORDER — FENTANYL CITRATE 50 UG/ML
25 INJECTION, SOLUTION INTRAMUSCULAR; INTRAVENOUS
Status: DISCONTINUED | OUTPATIENT
Start: 2017-03-06 | End: 2017-03-06 | Stop reason: HOSPADM

## 2017-03-06 RX ORDER — MORPHINE SULFATE 10 MG/ML
2 INJECTION, SOLUTION INTRAMUSCULAR; INTRAVENOUS
Status: DISCONTINUED | OUTPATIENT
Start: 2017-03-06 | End: 2017-03-06 | Stop reason: HOSPADM

## 2017-03-06 RX ORDER — SODIUM CHLORIDE, SODIUM LACTATE, POTASSIUM CHLORIDE, CALCIUM CHLORIDE 600; 310; 30; 20 MG/100ML; MG/100ML; MG/100ML; MG/100ML
25 INJECTION, SOLUTION INTRAVENOUS CONTINUOUS
Status: DISCONTINUED | OUTPATIENT
Start: 2017-03-06 | End: 2017-03-06 | Stop reason: HOSPADM

## 2017-03-06 RX ORDER — LIDOCAINE HYDROCHLORIDE 10 MG/ML
0.1 INJECTION, SOLUTION EPIDURAL; INFILTRATION; INTRACAUDAL; PERINEURAL AS NEEDED
Status: DISCONTINUED | OUTPATIENT
Start: 2017-03-06 | End: 2017-03-06 | Stop reason: HOSPADM

## 2017-03-06 RX ORDER — OXYCODONE AND ACETAMINOPHEN 5; 325 MG/1; MG/1
1 TABLET ORAL ONCE
Status: DISCONTINUED | OUTPATIENT
Start: 2017-03-06 | End: 2017-03-06 | Stop reason: HOSPADM

## 2017-03-06 RX ORDER — HYDROMORPHONE HYDROCHLORIDE 1 MG/ML
.2-.5 INJECTION, SOLUTION INTRAMUSCULAR; INTRAVENOUS; SUBCUTANEOUS ONCE
Status: DISCONTINUED | OUTPATIENT
Start: 2017-03-06 | End: 2017-03-06 | Stop reason: HOSPADM

## 2017-03-06 RX ORDER — CEFAZOLIN SODIUM IN 0.9 % NACL 2 G/100 ML
2 PLASTIC BAG, INJECTION (ML) INTRAVENOUS ONCE
Status: COMPLETED | OUTPATIENT
Start: 2017-03-06 | End: 2017-03-06

## 2017-03-06 RX ORDER — FENTANYL CITRATE 50 UG/ML
INJECTION, SOLUTION INTRAMUSCULAR; INTRAVENOUS AS NEEDED
Status: DISCONTINUED | OUTPATIENT
Start: 2017-03-06 | End: 2017-03-06 | Stop reason: HOSPADM

## 2017-03-06 RX ORDER — LIDOCAINE HYDROCHLORIDE 20 MG/ML
INJECTION, SOLUTION EPIDURAL; INFILTRATION; INTRACAUDAL; PERINEURAL AS NEEDED
Status: DISCONTINUED | OUTPATIENT
Start: 2017-03-06 | End: 2017-03-06 | Stop reason: HOSPADM

## 2017-03-06 RX ORDER — CEFAZOLIN SODIUM IN 0.9 % NACL 2 G/100 ML
PLASTIC BAG, INJECTION (ML) INTRAVENOUS
Status: DISCONTINUED
Start: 2017-03-06 | End: 2017-03-06 | Stop reason: HOSPADM

## 2017-03-06 RX ORDER — SODIUM CHLORIDE 0.9 % (FLUSH) 0.9 %
5-10 SYRINGE (ML) INJECTION EVERY 8 HOURS
Status: DISCONTINUED | OUTPATIENT
Start: 2017-03-06 | End: 2017-03-06 | Stop reason: HOSPADM

## 2017-03-06 RX ADMIN — SODIUM CHLORIDE, SODIUM LACTATE, POTASSIUM CHLORIDE, AND CALCIUM CHLORIDE 25 ML/HR: 600; 310; 30; 20 INJECTION, SOLUTION INTRAVENOUS at 10:01

## 2017-03-06 RX ADMIN — FENTANYL CITRATE 25 MCG: 50 INJECTION, SOLUTION INTRAMUSCULAR; INTRAVENOUS at 12:05

## 2017-03-06 RX ADMIN — CEFAZOLIN 2 G: 10 INJECTION, POWDER, FOR SOLUTION INTRAVENOUS; PARENTERAL at 12:08

## 2017-03-06 RX ADMIN — LIDOCAINE HYDROCHLORIDE 20 MG: 20 INJECTION, SOLUTION EPIDURAL; INFILTRATION; INTRACAUDAL; PERINEURAL at 12:05

## 2017-03-06 RX ADMIN — MIDAZOLAM HYDROCHLORIDE 2 MG: 1 INJECTION, SOLUTION INTRAMUSCULAR; INTRAVENOUS at 12:02

## 2017-03-06 RX ADMIN — PROPOFOL 75 MCG/KG/MIN: 10 INJECTION, EMULSION INTRAVENOUS at 12:05

## 2017-03-06 RX ADMIN — FENTANYL CITRATE 25 MCG: 50 INJECTION, SOLUTION INTRAMUSCULAR; INTRAVENOUS at 12:20

## 2017-03-06 NOTE — DISCHARGE INSTRUCTIONS
Dr. Elizabeth Betancourt, [facility]. Phone: [phone]    Post-op Instructions    Proper care during the postoperative period is an integral part of your surgical treatment program. It is imperative that these instructions are followed to ensure proper healing and to obtain the best results. 1. Go directly home and keep your feet elevated on the way, Get plenty of rest over the next 3-4 days, drink plenty of fluids, and eat at least two well-balanced meals. 2. Due to the type of surgery you had, we ask that you:   ___ Do not put any weight on your foot   __X_ Only put weight on your heel   ___ You can put full weight on your foot as tolerated    3. Elevate your feet 6 inches above hip level by supporting feet and legs with pillows. 4. Apply an ice bag covered with a towel just above but not on the operative site for 30 minutes out of each hour for he first 48 hours (daytime). Icing can be continued after 48 hours, but it is most important during the first 2 days. 5. Swelling is expected. Occasionally, the skin may take on a bruised appearance. This is no cause for alarm. 6. Keep your bandage/cast clean & dry. DO NOT remove the bandages or inspect the wound. A small amount of blood on the bandage is normal.    7. Exercise your legs frequently by bending your knee to stimulate circulation and speed healing. You can bend your knee 20-30 times over a 5 minute period and repeat it 3-4 times per day. 8. Have prescriptions filled and take medication as directed. Be sure to eat before taking any pain medication, otherwise it may cause nausea. If medication causes stomach upset, headache, rash, or other abnormal reactions discontinue use and CALL THE DOCTOR. 9. You have been given several medications to take after surgery. · You may have been given an anti-inflammatory (such as ibuprofen) which you should take 2-3 times daily regardless of whether you have pain.  These medications help reduce inflammation and kill pain as well, allowing you to rely less on the narcotic pain medication. · You may also have been given a narcotic (such as Oxycodone, Hydrocodone, Meperidine, Hydromorphone, etc) \"pain medication\" to help with acute pain. You should take these as soon as you begin to have soreness, pins/needles, or tingling after your surgery, even if you are not having severe pain. This will allow you to \"stay ahead of the pain\". You do not want to get behind the pain and be chasing after it. It may be helpful to take 2 pain pills for the first few doses and 1 pain pill thereafter if you are having mild to moderate pain. You can take most narcotics every 3-4 hours if you need to over the first 3-4 days. Many of these narcotics are combined with Tylenol (acetaminophen), which also helps with pain relief; do not take extra Tylenol if it is in your medication. · You may also have been prescribed an anti-nausea medication (such as Phenergan, Compazine, or Zofran). This helps with nausea or stomach upset which often accompanies the use of narcotics. Take these as needed and know that nausea is a normal side effect of any pain medication. You will decrease the chance of having nausea if you take the pain medication after eating. Please call the office if it is excessive &/or uncontrolled with medication. · You may also have been prescribed an antibiotic (such as Cephalexin, Clindamycin, or Trimethoprim/smx). Your surgeon will determine if this is necessary based on your medical conditions, type of surgery, and length of surgery. You should take this as prescribed until finished. Stomach upset and diarrhea is a common side effect of antibiotics and can be dramatically reduced or eliminated with the use of a probiotic. Please ask your pharmacist about a probiotic if you experience any of these side effects with your antibiotic.     10. Your return appointment with your doctor is __________4 days___________ at the ______[place]________ office. DO NOT TAKE TYLENOL/ACETAMINOPHEN WITH PERCOCET, [facility], [address] TAKE NARCOTIC PAIN MEDICATIONS WITH FOOD   Narcotics tend to be constipating, we suggest taking a stool softener such as Colace or Miralax (follow package instructions). DO NOT DRIVE WHILE TAKING NARCOTIC PAIN MEDICATIONS. DO NOT TAKE SLEEPING MEDICATIONS OR ANTIANXIETY MEDICATIONS WHILE TAKING NARCOTIC PAIN MEDICATIONS,  ESPECIALLY THE NIGHT OF ANESTHESIA. CPAP PATIENTS BE SURE TO WEAR MACHINE WHENEVER NAPPING OR SLEEPING. DISCHARGE SUMMARY from Nurse    The following personal items collected during your admission are returned to you:   Dental Appliance: Dental Appliances: None  Vision: Visual Aid: Magnifying glass  Hearing Aid:    Jewelry: Jewelry: None  Clothing: Clothing: Footwear, Pants, Shirt, Undergarments, With patient  Other Valuables: Other Valuables: None  Valuables sent to safe:        PATIENT INSTRUCTIONS:    After General Anesthesia or Intravenous Sedation, for 24 hours or while taking prescription Narcotics:        Someone should be with you for the next 24 hours. For your own safety, a responsible adult must drive you home. · Limit your activities  · Recommended activity: Rest today, up with assistance today. Do not climb stairs or shower unattended for the next 24 hours. · Please start with a soft bland diet and advance as tolerated (no nausea) to regular diet. · If you have a sore throat you should try the following: fluids, warm salt water gargles, or throat lozenges. If it does not improve after several days please follow up with your primary physician. · Do not drive and operate hazardous machinery  · Do not make important personal or business decisions  · Do  not drink alcoholic beverages  · If you have not urinated within 8 hours after discharge, please contact your surgeon on call.     Report the following to your surgeon:  · Excessive pain, swelling, redness or odor of or around the surgical area  · Temperature over 100.5  · Nausea and vomiting lasting longer than 4 hours or if unable to take medications  · Any signs of decreased circulation or nerve impairment to extremity: change in color, persistent  numbness, tingling, coldness or increase pain      · You will receive a Post Operative Call from one of the Recovery Room Nurses on the day after your surgery to check on you. It is very important for us to know how you are recovering after your surgery. If you have an issue or need to speak with someone, please call your surgeon, do not wait for the post operative call. · You may receive an e-mail or letter in the mail from [facility] regarding your experience with us in the Ambulatory Surgery Unit. Your feedback is valuable to us and we appreciate your participation in the survey. · If the above instructions are not adequate, please contact Liss Fisher RN, [facility] anesthesia Nurse Manager or our Anesthesiologist, at [phone]. If you are having problems after your surgery, call the physician at his office number. · We wish you a speedy recovery ? What to do at Home:      *  Please give a list of your current medications to your Primary Care Provider. *  Please update this list whenever your medications are discontinued, doses are      changed, or new medications (including over-the-counter products) are added. *  Please carry medication information at all times in case of emergency situations. These are general instructions for a healthy lifestyle:    No smoking/ No tobacco products/ Avoid exposure to second hand smoke    Surgeon General's Warning:  Quitting smoking now greatly reduces serious risk to your health.     Obesity, smoking, and sedentary lifestyle greatly increases your risk for illness    A healthy diet, regular physical exercise & weight monitoring are important for maintaining a healthy lifestyle    You may be retaining fluid if you have a history of heart failure or if you experience any of the following symptoms:  Weight gain of 3 pounds or more overnight or 5 pounds in a week, increased swelling in our hands or feet or shortness of breath while lying flat in bed. Please call your doctor as soon as you notice any of these symptoms; do not wait until your next office visit. Recognize signs and symptoms of STROKE:    F-face looks uneven    A-arms unable to move or move even    S-speech slurred or non-existent    T-time-call 911 as soon as signs and symptoms begin-DO NOT go       Back to bed or wait to see if you get better-TIME IS BRAIN. If you have not received your influenza and/or pneumococcal vaccine, please follow up with your primary care physician. The discharge information has been reviewed with the patient and caregiver. The patient and caregiver verbalized understanding.

## 2017-03-06 NOTE — ANESTHESIA PREPROCEDURE EVALUATION
Anesthetic History     PONV          Review of Systems / Medical History  Patient summary reviewed, nursing notes reviewed and pertinent labs reviewed    Pulmonary  Within defined limits                 Neuro/Psych   Within defined limits           Cardiovascular    Hypertension: well controlled              Exercise tolerance: >4 METS     GI/Hepatic/Renal  Within defined limits              Endo/Other    Diabetes: type 2         Other Findings              Physical Exam    Airway  Mallampati: III  TM Distance: 4 - 6 cm  Neck ROM: normal range of motion   Mouth opening: Normal     Cardiovascular  Regular rate and rhythm,  S1 and S2 normal,  no murmur, click, rub, or gallop  Rhythm: regular  Rate: normal      Pertinent negatives: No murmur   Dental  No notable dental hx       Pulmonary  Breath sounds clear to auscultation               Abdominal  GI exam deferred       Other Findings            Anesthetic Plan    ASA: 2  Anesthesia type: MAC          Induction: Intravenous  Anesthetic plan and risks discussed with: Patient      preop glucose 150. PONV prophylaxis.

## 2017-03-06 NOTE — ANESTHESIA POSTPROCEDURE EVALUATION
Post-Anesthesia Evaluation and Assessment    Patient: Daylin Velazquez MRN: [id number]  SSN: [id number]    YOB: 1955  Age: 59 y.o. Sex: male       Cardiovascular Function/Vital Signs  Visit Vitals    /58 (BP 1 Location: Left arm, BP Patient Position: Head of bed elevated (Comment degrees))    Pulse 69    Temp 36.3 °C (97.4 °F)    Resp 18    Ht 5' 10\" (1.778 m)    Wt 95.7 kg (211 lb)    SpO2 95%    BMI 30.28 kg/m2       Patient is status post MAC anesthesia for Procedure(s):  LEFT 5TH METATARSAL RESECTION,   LEFT DEBRIDEMENT ULCER MUSCLE FOOT (MAC W/LOCAL). Nausea/Vomiting: None    Postoperative hydration reviewed and adequate. Pain:  Pain Scale 1: Numeric (0 - 10) (03/06/17 1330)  Pain Intensity 1: 0 (03/06/17 1330)   Managed    Neurological Status:   Neuro (WDL): Within Defined Limits (03/06/17 1330)  Neuro  Neurologic State: Alert (03/06/17 1330)  LUE Motor Response: Purposeful (03/06/17 1330)  LLE Motor Response: Purposeful (03/06/17 1330)  RUE Motor Response: Purposeful (03/06/17 1330)  RLE Motor Response: Purposeful (03/06/17 1330)   At baseline    Mental Status and Level of Consciousness: Arousable    Pulmonary Status:   O2 Device: Room air (03/06/17 1330)   Adequate oxygenation and airway patent    Complications related to anesthesia: None    Post-anesthesia assessment completed.  No concerns    Signed By: Guillermina Burns MD     March 6, 2017

## 2017-03-06 NOTE — OP NOTES
[place]   [facility], [address] [place] [address] Ave   OP NOTE       Name:  Dieter Mc   MR#:  [id number]   :  1955   Account #:  [de-identified]    Surgery Date:  2017   Date of Adm:  2017       PREOPERATIVE DIAGNOSIS: Osteomyelitis, left fifth metatarsal,   fourth metatarsal, and ulceration to the left lateral foot. POSTOPERATIVE DIAGNOSIS: Osteomyelitis, left fifth metatarsal,   fourth metatarsal, and ulceration to the left lateral foot. PROCEDURES PERFORMED: Left fifth metatarsal and fourth   metatarsal resection, debridement and closure of left lateral foot   ulceration. PREOPERATIVE INDICATIONS: The patient is a 75-year-old male   diabetic who presented to my office in 2017 with a chronic   ulceration to the left fifth and fourth digits with exposed bone and   purulent drainage, surrounding cellulitis, abscess formation, took the   patient for amputation of toe ended up with the fifth and fourth   metatarsal amputation and secondary intention closure of the wound. The patient closed the wound up quickly and did great until the new   ulceration showed up on the lateral aspect of the base of the fifth   metatarsal at the resection site. I discussed with the patient the need   for resection of the bone and possible closure, excision of the   ulceration. All risks, benefits and alternatives were discussed with the   patient prior to the operative procedure and the patient was agreeable   to the elective procedure performed today. DESCRIPTION OF PROCEDURE: The patient was rolled to the   operating room, was laid on table in supine position. A first   preoperative time-out was performed to confirm that the left foot was   the correct operative site.  The patient was then prepped and draped in   the usual sterile fashion had Betadine paint and scrub, was sedated   with MAC anesthesia with local anesthesia was used and the left ankle tourniquet applied. A second preop time-out confirmed that the left foot   was the correct operative site. Next, our attention was directed to the   left lateral mid foot where a 1.5 cm linear incision full-thickness down to   bone with exposed fifth metatarsal was seen. A 6 cm semi-elliptical   incision was used to excise out the previous ulceration  visualization   of the fifth metatarsal. Subcutaneous tissues were bluntly dissected. The bone was undermined with a key elevator and then a TPS saw   was then used to make an oblique angle osteotomy of the fifth   metatarsal. This was inked proximally and sent for pathology for   evaluation of osteomyelitis. The cortical bone was very dense with   no soft spongy or discolored areas. After this was removed, there was   probing the soft tissues. There was no purulent drainage or abscess   formation observed. During the case today, the fourth metatarsal   appeared to have some fragmentations were hypertrophic and   exposing into the incision and for this, a TPS saw was then used to   make the oblique osteotomy of the fourth metatarsal on the left   foot. The distal 1/2 metatarsal was removed and on the back table and   sent for pathology. After this, the site was copiously irrigated. A   rongeur was used to remove all nonviable tissues as well as any the   flexor or extensor tendons that were visible within the incision and they   were resected as far proximal as possible and after copious irrigation,   soft tissue cultures were obtained for aerobic, anaerobic,   and culture, Gram stain of the left foot wound, and after this, the site   was then reapproximated with interrupted 3-0 Vicryl suture, and the   skin was closed with interrupted 3-0 nylon suture. Skin was closed very   nicely with excellent capillary refill to the skin edges. The tourniquet   was deflated. The patient had Xeroform, 4 x 4s, rolled gauze, and an   Ace wrap applied to the left lower extremity. The patient was then   awakened and taken to the PACU with vitals stable and intact. DISPOSITION: The patient will remain heel weightbearing to the left   lower extremity will followup in clinic in 4 days for postoperative   evaluation. SURGEON: Tahir Castañeda DPM.    ANESTHESIA: MAC and local.    ESTIMATED BLOOD LOSS: 0 mL with a left ankle tourniquet. SPECIMENS REMOVED: A Gram stain, aerobic and anaerobic   cultures of the left foot wound, as well as the fifth metatarsal for   pathology for evaluation of osteomyelitis at the proximal margin. COMPLICATIONS: none.         KRISTEL Vazquez / Dagmar   D:  03/06/2017   13:13   T:  03/06/2017   13:33   Job #:  [id number]

## 2017-03-06 NOTE — PERIOP NOTES
Seema Alysia  1955  [id number]    Situation:  Verbal report given from: GEM Kidd CRNA & ASHLEY Chavez RN  Procedure: Procedure(s):  LEFT 5TH METATARSAL RESECTION,   LEFT DEBRIDEMENT ULCER MUSCLE FOOT (MAC W/LOCAL)    Background:    Preoperative diagnosis: OSTEOMYOLITIS LEFT FOOT, TYP TWO DIBEATES WITH SKIN ULCER    Postoperative diagnosis: OSTEOMYOLITIS LEFT FOOT, TYP TWO DIBEATES WITH SKIN ULCER    :  Dr. Marianne Perez    Assistant(s): Circ-1: Khanh Shin  Circ-Relief: Eamon Hernandez RN  Scrub Tech-1: Zeyad Casanova    Specimens:   ID Type Source Tests Collected by Time Destination   1 : 5th metatarsal bone biopsy Fresh Foot, left  Janette Alert, DPM 3/6/2017 1252 Pathology   1 : 5th metatarsal culture, left foot Wound Foot, left CULTURE, ANAEROBIC AND AEROBIC, GRAM STAIN Janette Alert, DP 3/6/2017 1243 Microbiology       Assessment:  Intra-procedure medications         Anesthesia gave intra-procedure sedation and medications, see anesthesia flow sheet     Intravenous fluids: LR@ KVO     Vital signs stable       Recommendation:    Permission to share finding with family or friend yes

## 2017-03-06 NOTE — BRIEF OP NOTE
BRIEF OPERATIVE NOTE    Date of Procedure: 3/6/2017   Preoperative Diagnosis: OSTEOMYOLITIS LEFT FOOT, TYP TWO DIBEATES WITH SKIN ULCER  Postoperative Diagnosis: OSTEOMYOLITIS LEFT FOOT, TYP TWO DIBEATES WITH SKIN ULCER    Procedure(s):  LEFT 5TH METATARSAL RESECTION,   LEFT DEBRIDEMENT ULCER MUSCLE FOOT (MAC W/LOCAL)  Surgeon(s) and Role:     * Deloris Larkin DPM - Primary            Surgical Staff:  Circ-1: Zayda Kennedy  Circ-Relief: Johana Reeves RN  Scrub Tech-1: Candace Partida  Event Time In   Incision Start 1219   Incision Close 1455     Anesthesia: MAC   Estimated Blood Loss: 0mL  Specimens:   ID Type Source Tests Collected by Time Destination   1 : 5th metatarsal bone biopsy Fresh Foot, left  Deloris Larkin DPM 3/6/2017 1252 Pathology   1 : 5th metatarsal culture, left foot Wound Foot, left CULTURE, ANAEROBIC AND AEROBIC, Kyleigh Payne DPM 3/6/2017 1243 Microbiology      Findings: Likely fragmentation of the left 5th metatarsal and 4th metatarsal neck. No abscess or purulent drainage. Soft tissues in healthy appearance.     Complications: none  Implants: * No implants in log *

## 2017-03-06 NOTE — IP AVS SNAPSHOT
[address] [facility] 
[phone] Patient: Cherry Lange MRN: [id number]/10/[id number] You are allergic to the following No active allergies Recent Documentation Height Weight BMI Smoking Status 1.778 m 95.7 kg 30.28 kg/m2 Never Smoker Emergency Contacts Name Discharge Info Relation Home Work [facility] DISCHARGE CAREGIVER [3] Sister [23]   [phone] About your hospitalization You were admitted on:  2017 You last received care in the:  [facility] ASU PACU You were discharged on:  2017 Unit phone number:  [phone] Why you were hospitalized Your primary diagnosis was:  Not on File Providers Seen During Your Hospitalizations Provider Role Specialty Primary office phone Sanjiv Edmond DPM Attending Provider Podiatry [phone] Your Primary Care Physician (PCP) Primary Care Physician Office Phone Office Fax Miroslava David [phone] Follow-up Information Follow up With Details Comments Contact Info Maryetta Epley, MD   [facility] [address] [facility] 
[phone] Current Discharge Medication List  
  
CONTINUE these medications which have NOT CHANGED Dose & Instructions Dispensing Information Comments Morning Noon Evening Bedtime  
 aspirin 81 mg chewable tablet Your next dose is: Today, Tomorrow Other:  _________ Dose:  81 mg Take 81 mg by mouth daily. Refills:  0  
     
   
   
   
  
 glyBURIDE 5 mg tablet Commonly known as:  Harleen Laura Your next dose is: Today, Tomorrow Other:  _________ Dose:  5 mg Take 5 mg by mouth three (3) times daily. Refills:  0 JARDIANCE 10 mg tablet Generic drug:  empagliflozin Your next dose is: Today, Tomorrow Other:  _________ Take  by mouth daily. Refills:  0  
     
   
   
   
  
 lisinopril 5 mg tablet Commonly known as:  Marion November Your next dose is: Today, Tomorrow Other:  _________ Dose:  5 mg Take 5 mg by mouth daily. Refills:  0  
     
   
   
   
  
 multivitamin tablet Commonly known as:  ONE A DAY Your next dose is: Today, Tomorrow Other:  _________ Dose:  1 Tab Take 1 Tab by mouth daily. Refills:  0  
     
   
   
   
  
 oxyCODONE-acetaminophen 5-325 mg per tablet Commonly known as:  PERCOCET Your next dose is: Today, Tomorrow Other:  _________ Dose:  1-2 Tab Take 1-2 Tabs by mouth every four (4) hours as needed. Max Daily Amount: 12 Tabs. Quantity:  40 Tab Refills:  0  
     
   
   
   
  
 pravastatin 20 mg tablet Commonly known as:  PRAVACHOL Your next dose is: Today, Tomorrow Other:  _________ Dose:  20 mg Take 20 mg by mouth nightly. Refills:  0 Discharge Instructions Dr. Rowdy Uriarte [address] [place] [address], DONNIE. Phone: [phone] Post-op Instructions Proper care during the postoperative period is an integral part of your surgical treatment program. It is imperative that these instructions are followed to ensure proper healing and to obtain the best results. 1. Go directly home and keep your feet elevated on the way, Get plenty of rest over the next 3-4 days, drink plenty of fluids, and eat at least two well-balanced meals. 2. Due to the type of surgery you had, we ask that you: 
 ___ Do not put any weight on your foot 
 __X_ Only put weight on your heel 
 ___ You can put full weight on your foot as tolerated 3. Elevate your feet 6 inches above hip level by supporting feet and legs with pillows.  
 
4. Apply an ice bag covered with a towel just above but not on the operative site for 30 minutes out of each hour for he first 48 hours (daytime). Icing can be continued after 48 hours, but it is most important during the first 2 days. 5. Swelling is expected. Occasionally, the skin may take on a bruised appearance. This is no cause for alarm. 6. Keep your bandage/cast clean & dry. DO NOT remove the bandages or inspect the wound. A small amount of blood on the bandage is normal. 
 
7. Exercise your legs frequently by bending your knee to stimulate circulation and speed healing. You can bend your knee 20-30 times over a 5 minute period and repeat it 3-4 times per day. 8. Have prescriptions filled and take medication as directed. Be sure to eat before taking any pain medication, otherwise it may cause nausea. If medication causes stomach upset, headache, rash, or other abnormal reactions discontinue use and CALL THE DOCTOR. 9. You have been given several medications to take after surgery. · You may have been given an anti-inflammatory (such as ibuprofen) which you should take 2-3 times daily regardless of whether you have pain. These medications help reduce inflammation and kill pain as well, allowing you to rely less on the narcotic pain medication. · You may also have been given a narcotic (such as Oxycodone, Hydrocodone, Meperidine, Hydromorphone, etc) \"pain medication\" to help with acute pain. You should take these as soon as you begin to have soreness, pins/needles, or tingling after your surgery, even if you are not having severe pain. This will allow you to \"stay ahead of the pain\". You do not want to get behind the pain and be chasing after it. It may be helpful to take 2 pain pills for the first few doses and 1 pain pill thereafter if you are having mild to moderate pain. You can take most narcotics every 3-4 hours if you need to over the first 3-4 days.  Many of these narcotics are combined with Tylenol (acetaminophen), which also helps with pain relief; do not take extra Tylenol if it is in your medication. · You may also have been prescribed an anti-nausea medication (such as Phenergan, Compazine, or Zofran). This helps with nausea or stomach upset which often accompanies the use of narcotics. Take these as needed and know that nausea is a normal side effect of any pain medication. You will decrease the chance of having nausea if you take the pain medication after eating. Please call the office if it is excessive &/or uncontrolled with medication. · You may also have been prescribed an antibiotic (such as Cephalexin, Clindamycin, or Trimethoprim/smx). Your surgeon will determine if this is necessary based on your medical conditions, type of surgery, and length of surgery. You should take this as prescribed until finished. Stomach upset and diarrhea is a common side effect of antibiotics and can be dramatically reduced or eliminated with the use of a probiotic. Please ask your pharmacist about a probiotic if you experience any of these side effects with your antibiotic. 10. Your return appointment with your doctor is __________4 days___________ at the ______[place]________ office. DO NOT TAKE TYLENOL/ACETAMINOPHEN WITH PERCOCET, [facility], [address] TAKE NARCOTIC PAIN MEDICATIONS WITH FOOD Narcotics tend to be constipating, we suggest taking a stool softener such as Colace or Miralax (follow package instructions). DO NOT DRIVE WHILE TAKING NARCOTIC PAIN MEDICATIONS. DO NOT TAKE SLEEPING MEDICATIONS OR ANTIANXIETY MEDICATIONS WHILE TAKING NARCOTIC PAIN MEDICATIONS,  ESPECIALLY THE NIGHT OF ANESTHESIA. CPAP PATIENTS BE SURE TO WEAR MACHINE WHENEVER NAPPING OR SLEEPING. DISCHARGE SUMMARY from Nurse The following personal items collected during your admission are returned to you:  
Dental Appliance: Dental Appliances: None Vision: Visual Aid: Magnifying glass Hearing Aid:   
Jewelry: Jewelry: None Clothing: Clothing: Footwear, Pants, Shirt, Undergarments, With patient Other Valuables: Other Valuables: None Valuables sent to safe:   
 
 
PATIENT INSTRUCTIONS: 
 
After General Anesthesia or Intravenous Sedation, for 24 hours or while taking prescription Narcotics: 
      Someone should be with you for the next 24 hours. For your own safety, a responsible adult must drive you home. · Limit your activities · Recommended activity: Rest today, up with assistance today. Do not climb stairs or shower unattended for the next 24 hours. · Please start with a soft bland diet and advance as tolerated (no nausea) to regular diet. · If you have a sore throat you should try the following: fluids, warm salt water gargles, or throat lozenges. If it does not improve after several days please follow up with your primary physician. · Do not drive and operate hazardous machinery · Do not make important personal or business decisions · Do  not drink alcoholic beverages · If you have not urinated within 8 hours after discharge, please contact your surgeon on call. Report the following to your surgeon: 
· Excessive pain, swelling, redness or odor of or around the surgical area · Temperature over 100.5 · Nausea and vomiting lasting longer than 4 hours or if unable to take medications · Any signs of decreased circulation or nerve impairment to extremity: change in color, persistent  numbness, tingling, coldness or increase pain · You will receive a Post Operative Call from one of the Recovery Room Nurses on the day after your surgery to check on you. It is very important for us to know how you are recovering after your surgery. If you have an issue or need to speak with someone, please call your surgeon, do not wait for the post operative call.  
 
· You may receive an e-mail or letter in the mail from Gerald regarding your experience with us in the Ambulatory Surgery Unit. Your feedback is valuable to us and we appreciate your participation in the survey. · If the above instructions are not adequate, please contact Aime Bruno RN, [place] anesthesia Nurse Manager or our Anesthesiologist, at [phone]. If you are having problems after your surgery, call the physician at his office number. · We wish you a speedy recovery ? What to do at Home: *  Please give a list of your current medications to your Primary Care Provider. *  Please update this list whenever your medications are discontinued, doses are 
    changed, or new medications (including over-the-counter products) are added. *  Please carry medication information at all times in case of emergency situations. These are general instructions for a healthy lifestyle: No smoking/ No tobacco products/ Avoid exposure to second hand smoke Surgeon General's Warning:  Quitting smoking now greatly reduces serious risk to your health. Obesity, smoking, and sedentary lifestyle greatly increases your risk for illness A healthy diet, regular physical exercise & weight monitoring are important for maintaining a healthy lifestyle You may be retaining fluid if you have a history of heart failure or if you experience any of the following symptoms:  Weight gain of 3 pounds or more overnight or 5 pounds in a week, increased swelling in our hands or feet or shortness of breath while lying flat in bed. Please call your doctor as soon as you notice any of these symptoms; do not wait until your next office visit. Recognize signs and symptoms of STROKE: 
 
F-face looks uneven A-arms unable to move or move even S-speech slurred or non-existent T-time-call 911 as soon as signs and symptoms begin-DO NOT go Back to bed or wait to see if you get better-TIME IS BRAIN. If you have not received your influenza and/or pneumococcal vaccine, please follow up with your primary care physician. The discharge information has been reviewed with the patient and caregiver. The patient and caregiver verbalized understanding. Discharge Orders None Introducing [facility]! Sarah Rodriguez introduces Paytrail patient portal. Now you can access parts of your medical record, email your doctor's office, and request medication refills online. 1. In your internet browser, go to [email or web address] 2. Click on the First Time User? Click Here link in the Sign In box. You will see the New Member Sign Up page. 3. Enter your Paytrail Access Code exactly as it appears below. You will not need to use this code after youve completed the sign-up process. If you do not sign up before the expiration date, you must request a new code. · Paytrail Access Code: [email or web address] Expires: 4/13/2017 10:44 AM 
 
4. Enter the last four digits of your Social Security Number (xxxx) and Date of Birth (mm/dd/yyyy) as indicated and click Submit. You will be taken to the next sign-up page. 5. Create a Paytrail ID. This will be your Paytrail login ID and cannot be changed, so think of one that is secure and easy to remember. 6. Create a Paytrail password. You can change your password at any time. 7. Enter your Password Reset Question and Answer. This can be used at a later time if you forget your password. 8. Enter your e-mail address. You will receive e-mail notification when new information is available in [address] 9. Click Sign Up. You can now view and download portions of your medical record. 10. Click the Download Summary menu link to download a portable copy of your medical information. If you have questions, please visit the Frequently Asked Questions section of the Paytrail website.  Remember, Paytrail is NOT to be used for urgent needs. For medical emergencies, dial 911. Now available from your iPhone and Android! General Information Please provide this summary of care documentation to your next provider. Patient Signature:  ____________________________________________________________ Date:  ____________________________________________________________  
  
Aloma Snellen Provider Signature:  ____________________________________________________________ Date:  ____________________________________________________________

## 2017-03-09 LAB
BACTERIA SPEC CULT: NORMAL
SERVICE CMNT-IMP: NORMAL

## 2017-03-11 LAB
BACTERIA SPEC CULT: ABNORMAL
GRAM STN SPEC: ABNORMAL
GRAM STN SPEC: ABNORMAL
SERVICE CMNT-IMP: ABNORMAL

## 2017-03-17 ENCOUNTER — HOSPITAL ENCOUNTER (OUTPATIENT)
Age: 62
Setting detail: OBSERVATION
Discharge: HOME | End: 2017-03-19
Attending: PODIATRIST | Admitting: PODIATRIST
Payer: COMMERCIAL

## 2017-03-17 LAB
ANION GAP BLD CALC-SCNC: 12 MMOL/L (ref 5–15)
BUN SERPL-MCNC: 17 MG/DL (ref 6–20)
BUN/CREAT SERPL: 14 (ref 12–20)
CALCIUM SERPL-MCNC: 7.9 MG/DL (ref 8.5–10.1)
CHLORIDE SERPL-SCNC: 104 MMOL/L (ref 97–108)
CO2 SERPL-SCNC: 22 MMOL/L (ref 21–32)
CREAT SERPL-MCNC: 1.21 MG/DL (ref 0.7–1.3)
GLUCOSE BLD STRIP.AUTO-MCNC: 322 MG/DL (ref 65–100)
GLUCOSE SERPL-MCNC: 330 MG/DL (ref 65–100)
POTASSIUM SERPL-SCNC: 4 MMOL/L (ref 3.5–5.1)
SERVICE CMNT-IMP: ABNORMAL
SODIUM SERPL-SCNC: 138 MMOL/L (ref 136–145)

## 2017-03-17 PROCEDURE — 99218 HC RM OBSERVATION: CPT

## 2017-03-17 PROCEDURE — 74011250636 HC RX REV CODE- 250/636: Performed by: PODIATRIST

## 2017-03-17 PROCEDURE — 96365 THER/PROPH/DIAG IV INF INIT: CPT

## 2017-03-17 PROCEDURE — 36569 INSJ PICC 5 YR+ W/O IMAGING: CPT | Performed by: PODIATRIST

## 2017-03-17 PROCEDURE — 82962 GLUCOSE BLOOD TEST: CPT

## 2017-03-17 PROCEDURE — 74011000258 HC RX REV CODE- 258: Performed by: PODIATRIST

## 2017-03-17 PROCEDURE — 96366 THER/PROPH/DIAG IV INF ADDON: CPT

## 2017-03-17 PROCEDURE — G0378 HOSPITAL OBSERVATION PER HR: HCPCS

## 2017-03-17 PROCEDURE — 36415 COLL VENOUS BLD VENIPUNCTURE: CPT | Performed by: PODIATRIST

## 2017-03-17 PROCEDURE — 80048 BASIC METABOLIC PNL TOTAL CA: CPT | Performed by: PODIATRIST

## 2017-03-17 PROCEDURE — G0379 DIRECT REFER HOSPITAL OBSERV: HCPCS

## 2017-03-17 PROCEDURE — 96367 TX/PROPH/DG ADDL SEQ IV INF: CPT

## 2017-03-17 RX ORDER — SODIUM CHLORIDE 9 MG/ML
50 INJECTION, SOLUTION INTRAVENOUS CONTINUOUS
Status: DISCONTINUED | OUTPATIENT
Start: 2017-03-17 | End: 2017-03-19 | Stop reason: HOSPADM

## 2017-03-17 RX ORDER — HYDROCODONE BITARTRATE AND ACETAMINOPHEN 5; 325 MG/1; MG/1
1 TABLET ORAL
Status: DISCONTINUED | OUTPATIENT
Start: 2017-03-17 | End: 2017-03-19 | Stop reason: HOSPADM

## 2017-03-17 RX ORDER — SODIUM CHLORIDE 0.9 % (FLUSH) 0.9 %
5-10 SYRINGE (ML) INJECTION EVERY 8 HOURS
Status: DISCONTINUED | OUTPATIENT
Start: 2017-03-17 | End: 2017-03-19 | Stop reason: HOSPADM

## 2017-03-17 RX ORDER — DIPHENHYDRAMINE HYDROCHLORIDE 50 MG/ML
12.5 INJECTION, SOLUTION INTRAMUSCULAR; INTRAVENOUS
Status: DISCONTINUED | OUTPATIENT
Start: 2017-03-17 | End: 2017-03-19 | Stop reason: HOSPADM

## 2017-03-17 RX ORDER — SODIUM CHLORIDE 0.9 % (FLUSH) 0.9 %
5-10 SYRINGE (ML) INJECTION AS NEEDED
Status: DISCONTINUED | OUTPATIENT
Start: 2017-03-17 | End: 2017-03-19 | Stop reason: HOSPADM

## 2017-03-17 RX ORDER — VANCOMYCIN/0.9 % SOD CHLORIDE 1.5G/250ML
1500 PLASTIC BAG, INJECTION (ML) INTRAVENOUS EVERY 12 HOURS
Status: DISCONTINUED | OUTPATIENT
Start: 2017-03-18 | End: 2017-03-17

## 2017-03-17 RX ORDER — VANCOMYCIN/0.9 % SOD CHLORIDE 1.5G/250ML
1500 PLASTIC BAG, INJECTION (ML) INTRAVENOUS
Status: DISCONTINUED | OUTPATIENT
Start: 2017-03-18 | End: 2017-03-19 | Stop reason: HOSPADM

## 2017-03-17 RX ORDER — IBUPROFEN 400 MG/1
400 TABLET ORAL
Status: DISCONTINUED | OUTPATIENT
Start: 2017-03-17 | End: 2017-03-19 | Stop reason: HOSPADM

## 2017-03-17 RX ADMIN — SODIUM CHLORIDE 50 ML/HR: 900 INJECTION, SOLUTION INTRAVENOUS at 20:14

## 2017-03-17 RX ADMIN — VANCOMYCIN HYDROCHLORIDE 2500 MG: 10 INJECTION, POWDER, LYOPHILIZED, FOR SOLUTION INTRAVENOUS at 20:14

## 2017-03-17 RX ADMIN — CEFEPIME HYDROCHLORIDE 2 G: 2 INJECTION, POWDER, FOR SOLUTION INTRAVENOUS at 22:17

## 2017-03-17 NOTE — IP AVS SNAPSHOT
[address] [facility] 
[phone] Patient: Lissette Bartholomew MRN: [id number] PKY:9/70/7883 You are allergic to the following No active allergies Recent Documentation Height Weight BMI Smoking Status 1.778 m 97.6 kg 30.87 kg/m2 Never Smoker Emergency Contacts Name Discharge Info Relation Home Work [employer] [facility] DISCHARGE CAREGIVER [3] Sister [23]   [phone] About your hospitalization You were admitted on:  March 17, 2017 You last received care in the:  [facility] 3 ORTHOPEDICS You were discharged on:  March 19, 2017 Unit phone number:  [phone] Why you were hospitalized Your primary diagnosis was:  Not on File Providers Seen During Your Hospitalizations Provider Role Specialty Primary office phone Ivan Hess DPM Attending Provider Podiatry [phone] Your Primary Care Physician (PCP) Primary Care Physician Office Phone Office Fax Shay Castaneda [phone] Follow-up Information Follow up With Details Comments Contact Info Anabel Olivier MD   [facility] [address] [facility] 
[phone] [facility]   This is your infusion services provider. If you need assistance please contact them directly  [facility] Shawna 7 [phone] Current Discharge Medication List  
  
START taking these medications Dose & Instructions Dispensing Information Comments Morning Noon Evening Bedtime  
 cefepime 2 gram 2 g IVPB Your last dose was: Your next dose is:    
   
   
 Dose:  2 g  
2 g by IntraVENous route every eight (8) hours. Quantity:  36 Dose Refills:  0  Patient to be given Cefepime IV 2g through PICC line q8 hours for a total duration of 12 days total.  
    
   
   
   
  
  
 CONTINUE these medications which have NOT CHANGED Dose & Instructions Dispensing Information Comments Morning Noon Evening Bedtime  
 aspirin 81 mg chewable tablet Your last dose was: Your next dose is:    
   
   
 Dose:  81 mg Take 81 mg by mouth daily. Refills:  0  
     
   
   
   
  
 glyBURIDE 5 mg tablet Commonly known as:  Zakiya Payne Your last dose was: Your next dose is:    
   
   
 Dose:  5 mg Take 5 mg by mouth three (3) times daily. Refills:  0 JARDIANCE 10 mg tablet Generic drug:  empagliflozin Your last dose was: Your next dose is: Take  by mouth daily. Refills:  0 KOMBIGLYZE XR 2.5-1,000 mg Tm24 Generic drug:  sAXagliptin-metFORMIN Your last dose was: Your next dose is:    
   
   
 Dose:  2.5-1000 Tab Take 2.5-1,000 Tabs by mouth two (2) times a day. Indications: type 2 diabetes mellitus Refills:  0  
     
   
   
   
  
 lisinopril 5 mg tablet Commonly known as:  Katie Johnson Your last dose was: Your next dose is:    
   
   
 Dose:  5 mg Take 5 mg by mouth daily. Refills:  0  
     
   
   
   
  
 multivitamin tablet Commonly known as:  ONE A DAY Your last dose was: Your next dose is:    
   
   
 Dose:  1 Tab Take 1 Tab by mouth daily. Refills:  0  
     
   
   
   
  
 oxyCODONE-acetaminophen 5-325 mg per tablet Commonly known as:  PERCOCET Your last dose was: Your next dose is:    
   
   
 Dose:  1-2 Tab Take 1-2 Tabs by mouth every four (4) hours as needed. Max Daily Amount: 12 Tabs. Quantity:  40 Tab Refills:  0  
     
   
   
   
  
 pravastatin 20 mg tablet Commonly known as:  PRAVACHOL Your last dose was: Your next dose is:    
   
   
 Dose:  20 mg Take 20 mg by mouth nightly. Refills:  0 Where to Get Your Medications Information on where to get these meds will be given to you by the nurse or doctor. ! Ask your nurse or doctor about these medications  
  cefepime 2 gram 2 g IVPB Discharge Instructions None Discharge Orders None [facility] [employer] Announcement We are excited to announce that we are making your provider's discharge notes available to you in [facility] [employer]. You will see these notes when they are completed and signed by the physician that discharged you from your recent hospital stay. If you have any questions or concerns about any information you see in [facility] [employer], please call the Health Information Department where you were seen or reach out to your Primary Care Provider for more information about your plan of care. Introducing hospitals & HEALTH SERVICES! Leda Pollock introduces [facility] [employer] patient portal. Now you can access parts of your medical record, email your doctor's office, and request medication refills online. 1. In your internet browser, go to [email or web address] 2. Click on the First Time User? Click Here link in the Sign In box. You will see the New Member Sign Up page. 3. Enter your [facility] [employer] Access Code exactly as it appears below. You will not need to use this code after youve completed the sign-up process. If you do not sign up before the expiration date, you must request a new code. · [facility] [employer] Access Code: [email or web address] Expires: 4/13/2017 11:44 AM 
 
4. Enter the last four digits of your Social Security Number (xxxx) and Date of Birth (mm/dd/yyyy) as indicated and click Submit. You will be taken to the next sign-up page. 5. Create a [employer] ID. This will be your [facility] [employer] login ID and cannot be changed, so think of one that is secure and easy to remember. 6. Create a [facility] [employer] password. You can change your password at any time. 7. Enter your Password Reset Question and Answer. This can be used at a later time if you forget your password. 8. Enter your e-mail address. You will receive e-mail notification when new information is available in [address] 9. Click Sign Up. You can now view and download portions of your medical record. 10. Click the Download Summary menu link to download a portable copy of your medical information. If you have questions, please visit the Frequently Asked Questions section of the [employer] website. Remember, [employer] is NOT to be used for urgent needs. For medical emergencies, dial 911. Now available from your iPhone and Android! General Information Please provide this summary of care documentation to your next provider. Patient Signature:  ____________________________________________________________ Date:  ____________________________________________________________  
  
Rosalie Hero Provider Signature:  ____________________________________________________________ Date:  ____________________________________________________________

## 2017-03-18 ENCOUNTER — APPOINTMENT (OUTPATIENT)
Dept: GENERAL RADIOLOGY | Age: 62
End: 2017-03-18
Attending: PODIATRIST
Payer: COMMERCIAL

## 2017-03-18 LAB
ERYTHROCYTE [DISTWIDTH] IN BLOOD BY AUTOMATED COUNT: 15.1 % (ref 11.5–14.5)
GLUCOSE BLD STRIP.AUTO-MCNC: 160 MG/DL (ref 65–100)
GLUCOSE BLD STRIP.AUTO-MCNC: 167 MG/DL (ref 65–100)
GLUCOSE BLD STRIP.AUTO-MCNC: 170 MG/DL (ref 65–100)
GLUCOSE BLD STRIP.AUTO-MCNC: 250 MG/DL (ref 65–100)
HCT VFR BLD AUTO: 38.5 % (ref 36.6–50.3)
HGB BLD-MCNC: 12.8 G/DL (ref 12.1–17)
MCH RBC QN AUTO: 29.2 PG (ref 26–34)
MCHC RBC AUTO-ENTMCNC: 33.2 G/DL (ref 30–36.5)
MCV RBC AUTO: 87.7 FL (ref 80–99)
PLATELET # BLD AUTO: 259 K/UL (ref 150–400)
RBC # BLD AUTO: 4.39 M/UL (ref 4.1–5.7)
SERVICE CMNT-IMP: ABNORMAL
WBC # BLD AUTO: 6.5 K/UL (ref 4.1–11.1)

## 2017-03-18 PROCEDURE — G0378 HOSPITAL OBSERVATION PER HR: HCPCS

## 2017-03-18 PROCEDURE — C1751 CATH, INF, PER/CENT/MIDLINE: HCPCS

## 2017-03-18 PROCEDURE — 74011000258 HC RX REV CODE- 258: Performed by: PODIATRIST

## 2017-03-18 PROCEDURE — 76937 US GUIDE VASCULAR ACCESS: CPT

## 2017-03-18 PROCEDURE — 74011250637 HC RX REV CODE- 250/637: Performed by: PODIATRIST

## 2017-03-18 PROCEDURE — 96366 THER/PROPH/DIAG IV INF ADDON: CPT

## 2017-03-18 PROCEDURE — 74011250636 HC RX REV CODE- 250/636: Performed by: PODIATRIST

## 2017-03-18 PROCEDURE — 36415 COLL VENOUS BLD VENIPUNCTURE: CPT | Performed by: PODIATRIST

## 2017-03-18 PROCEDURE — 73620 X-RAY EXAM OF FOOT: CPT

## 2017-03-18 PROCEDURE — 82962 GLUCOSE BLOOD TEST: CPT

## 2017-03-18 PROCEDURE — 99218 HC RM OBSERVATION: CPT

## 2017-03-18 PROCEDURE — 77030018786 HC NDL GD F/USND BARD -B

## 2017-03-18 PROCEDURE — 85027 COMPLETE CBC AUTOMATED: CPT | Performed by: PODIATRIST

## 2017-03-18 RX ORDER — OXYCODONE AND ACETAMINOPHEN 5; 325 MG/1; MG/1
1-2 TABLET ORAL
Status: DISCONTINUED | OUTPATIENT
Start: 2017-03-18 | End: 2017-03-19 | Stop reason: HOSPADM

## 2017-03-18 RX ORDER — INSULIN LISPRO 100 [IU]/ML
INJECTION, SOLUTION INTRAVENOUS; SUBCUTANEOUS
Status: DISCONTINUED | OUTPATIENT
Start: 2017-03-18 | End: 2017-03-19 | Stop reason: HOSPADM

## 2017-03-18 RX ORDER — LISINOPRIL 5 MG/1
5 TABLET ORAL DAILY
Status: DISCONTINUED | OUTPATIENT
Start: 2017-03-18 | End: 2017-03-19 | Stop reason: HOSPADM

## 2017-03-18 RX ORDER — DEXTROSE 50 % IN WATER (D50W) INTRAVENOUS SYRINGE
12.5-25 AS NEEDED
Status: DISCONTINUED | OUTPATIENT
Start: 2017-03-18 | End: 2017-03-19 | Stop reason: HOSPADM

## 2017-03-18 RX ORDER — METFORMIN HYDROCHLORIDE 500 MG/1
1000 TABLET ORAL 2 TIMES DAILY WITH MEALS
Status: DISCONTINUED | OUTPATIENT
Start: 2017-03-18 | End: 2017-03-19 | Stop reason: HOSPADM

## 2017-03-18 RX ORDER — MAGNESIUM SULFATE 100 %
4 CRYSTALS MISCELLANEOUS AS NEEDED
Status: DISCONTINUED | OUTPATIENT
Start: 2017-03-18 | End: 2017-03-19 | Stop reason: HOSPADM

## 2017-03-18 RX ORDER — SODIUM CHLORIDE 0.9 % (FLUSH) 0.9 %
10-30 SYRINGE (ML) INJECTION AS NEEDED
Status: DISCONTINUED | OUTPATIENT
Start: 2017-03-18 | End: 2017-03-19 | Stop reason: HOSPADM

## 2017-03-18 RX ORDER — THERA TABS 400 MCG
1 TAB ORAL DAILY
Status: DISCONTINUED | OUTPATIENT
Start: 2017-03-18 | End: 2017-03-19 | Stop reason: HOSPADM

## 2017-03-18 RX ORDER — HEPARIN 100 UNIT/ML
300 SYRINGE INTRAVENOUS AS NEEDED
Status: DISCONTINUED | OUTPATIENT
Start: 2017-03-18 | End: 2017-03-19 | Stop reason: HOSPADM

## 2017-03-18 RX ORDER — SODIUM CHLORIDE 0.9 % (FLUSH) 0.9 %
10-40 SYRINGE (ML) INJECTION EVERY 8 HOURS
Status: DISCONTINUED | OUTPATIENT
Start: 2017-03-18 | End: 2017-03-19 | Stop reason: HOSPADM

## 2017-03-18 RX ORDER — SODIUM CHLORIDE 0.9 % (FLUSH) 0.9 %
10 SYRINGE (ML) INJECTION EVERY 24 HOURS
Status: DISCONTINUED | OUTPATIENT
Start: 2017-03-18 | End: 2017-03-19 | Stop reason: HOSPADM

## 2017-03-18 RX ORDER — PRAVASTATIN SODIUM 10 MG/1
20 TABLET ORAL
Status: DISCONTINUED | OUTPATIENT
Start: 2017-03-18 | End: 2017-03-19 | Stop reason: HOSPADM

## 2017-03-18 RX ORDER — SODIUM CHLORIDE 0.9 % (FLUSH) 0.9 %
10 SYRINGE (ML) INJECTION AS NEEDED
Status: DISCONTINUED | OUTPATIENT
Start: 2017-03-18 | End: 2017-03-19 | Stop reason: HOSPADM

## 2017-03-18 RX ORDER — GLYBURIDE 5 MG/1
5 TABLET ORAL
Status: DISCONTINUED | OUTPATIENT
Start: 2017-03-18 | End: 2017-03-19 | Stop reason: HOSPADM

## 2017-03-18 RX ADMIN — LISINOPRIL 5 MG: 5 TABLET ORAL at 09:50

## 2017-03-18 RX ADMIN — INSULIN LISPRO 5 UNITS: 100 INJECTION, SOLUTION INTRAVENOUS; SUBCUTANEOUS at 12:01

## 2017-03-18 RX ADMIN — GLYBURIDE 5 MG: 5 TABLET ORAL at 17:57

## 2017-03-18 RX ADMIN — LINAGLIPTIN 2.5 MG: 5 TABLET, FILM COATED ORAL at 09:51

## 2017-03-18 RX ADMIN — Medication 10 ML: at 12:03

## 2017-03-18 RX ADMIN — VANCOMYCIN HYDROCHLORIDE 1500 MG: 10 INJECTION, POWDER, LYOPHILIZED, FOR SOLUTION INTRAVENOUS at 12:00

## 2017-03-18 RX ADMIN — GLYBURIDE 5 MG: 5 TABLET ORAL at 12:01

## 2017-03-18 RX ADMIN — Medication 10 ML: at 21:45

## 2017-03-18 RX ADMIN — METFORMIN HYDROCHLORIDE 1000 MG: 500 TABLET, FILM COATED ORAL at 17:57

## 2017-03-18 RX ADMIN — INSULIN LISPRO 2 UNITS: 100 INJECTION, SOLUTION INTRAVENOUS; SUBCUTANEOUS at 09:48

## 2017-03-18 RX ADMIN — Medication 10 ML: at 14:00

## 2017-03-18 RX ADMIN — PRAVASTATIN SODIUM 20 MG: 10 TABLET ORAL at 21:46

## 2017-03-18 RX ADMIN — METFORMIN HYDROCHLORIDE 1000 MG: 500 TABLET, FILM COATED ORAL at 09:50

## 2017-03-18 RX ADMIN — CEFEPIME 2 G: 2 INJECTION, POWDER, FOR SOLUTION INTRAVENOUS at 21:46

## 2017-03-18 RX ADMIN — PRAVASTATIN SODIUM 20 MG: 10 TABLET ORAL at 00:57

## 2017-03-18 RX ADMIN — GLYBURIDE 5 MG: 5 TABLET ORAL at 09:50

## 2017-03-18 RX ADMIN — LINAGLIPTIN 2.5 MG: 5 TABLET, FILM COATED ORAL at 18:04

## 2017-03-18 RX ADMIN — Medication 10 ML: at 21:46

## 2017-03-18 RX ADMIN — CEFEPIME 2 G: 2 INJECTION, POWDER, FOR SOLUTION INTRAVENOUS at 17:56

## 2017-03-18 RX ADMIN — CEFEPIME 2 G: 2 INJECTION, POWDER, FOR SOLUTION INTRAVENOUS at 05:50

## 2017-03-18 RX ADMIN — COLLAGENASE SANTYL: 250 OINTMENT TOPICAL at 09:00

## 2017-03-18 RX ADMIN — THERA TABS 1 TABLET: TAB at 09:50

## 2017-03-18 RX ADMIN — INSULIN LISPRO 2 UNITS: 100 INJECTION, SOLUTION INTRAVENOUS; SUBCUTANEOUS at 17:56

## 2017-03-18 NOTE — PROGRESS NOTES
Called Dr. Sanjuana Olivo office re:patient's hyperglycemia with no sliding scale coverage. Waiting to hear back from the doctor's office. 2340: Spoke with Dr. David Coles who will put in new orders.

## 2017-03-18 NOTE — PROGRESS NOTES
Primary Nurse Allison Clark, TALON and Sofiya Torres RN performed a dual skin assessment on this patient No impairment noted  Richard score is 21    Pt has toes amputated BLE

## 2017-03-18 NOTE — H&P
Podiatry History and Physical    Subjective:         Patient is a 59 y.o.  male who is being seen for left diabetic foot infection with preivous osteomyelitis. Workup has revealed recent surgery on 3/6/17 for left 4th and 5th metatarsal resection with closure of the wound. Patient had surgery as an outpatient procedure and resulting bone pathology showed osteomyelitis in the bone that was removed, and soft tissue cultures with Staph and resistant pseudomonas. Patient was then direct admitted as need for PICC line placement and starting of IV antibiotics for this condition. Has denies any n/f/v/c. No other complaints today. .    Patient Active Problem List    Diagnosis Date Noted    Diabetes mellitus with neuropathy ([facility] 01/17/2017    Osteomyelitis of left foot ([facility] [facility] 01/17/2017    Osteomyelitis of right foot ([facility] [facility] 01/17/2017     Past Medical History:   Diagnosis Date    Diabetes ([facility] [facility]     High cholesterol     Hypertension     Nausea & vomiting       No family history on file. Social History   Substance Use Topics    Smoking status: Never Smoker    Smokeless tobacco: Not on file    Alcohol use No     Past Surgical History:   Procedure Laterality Date    HX CATARACT REMOVAL Bilateral 2012    HX ORTHOPAEDIC Right     great toe amputation    HX ORTHOPAEDIC Left     2 small toes amputated      Prior to Admission medications    Medication Sig Start Date End Date Taking? Authorizing Provider   sAXagliptin-metFORMIN (KOMBIGLYZE XR) 2.5-1,000 mg TM24 Take 2.5-1,000 Tabs by mouth two (2) times a day. Indications: type 2 diabetes mellitus   Yes Historical Provider   pravastatin (PRAVACHOL) 20 mg tablet Take 20 mg by mouth nightly. Yes Ofelia Arellano MD   lisinopril (PRINIVIL, ZESTRIL) 5 mg tablet Take 5 mg by mouth daily. Yes Phys Other, MD   glyBURIDE (DIABETA) 5 mg tablet Take 5 mg by mouth three (3) times daily. Yes Ofelia Arellano MD   multivitamin (ONE A DAY) tablet Take 1 Tab by mouth daily. Yes Ofelia Arellano MD   empagliflozin (JARDIANCE) 10 mg tablet Take  by mouth daily. Yes Ofelia Arellano MD   oxyCODONE-acetaminophen (PERCOCET) 5-325 mg per tablet Take 1-2 Tabs by mouth every four (4) hours as needed. Max Daily Amount: 12 Tabs. 17   Karlo Shea DPM   aspirin 81 mg chewable tablet Take 81 mg by mouth daily. Ofelia Arellano MD     No Known Allergies     Review of Systems:  A comprehensive review of systems was negative except for that written in the HPI. Objective:     Patient Vitals for the past 8 hrs:   BP Temp Pulse Resp SpO2 Height Weight   17 2339 131/57 99.1 °F (37.3 °C) 72 16 94 % - -   17 - - - - - 5' 10\" (1.778 m) 97.6 kg (215 lb 2.7 oz)   17 1930 115/68 98.1 °F (36.7 °C) 81 16 97 % - -   17 1909 - - - - - - 97.6 kg (215 lb 2.7 oz)     Temp (24hrs), Av.6 °F (37 °C), Min:98.1 °F (36.7 °C), Max:99.1 °F (37.3 °C)      General:  Alert, cooperative, well noursished, well developed, appears stated age   Eyes:  Sclera anicteric. Pupils equally round and reactive to light. Mouth/Throat: Mucous membranes normal, oral pharynx clear   Neck: Supple   Lungs:   Clear to auscultation bilaterally, good effort   CV:  Regular rate and rhythm,no murmur, click, rub or gallop   Abdomen:   Soft, non-tender. bowel sounds normal. non-distended   Extremities: Mild erythema and swelling   Skin: Left lateral foot incisional wound  With superficial skin ulceration. No deep probing to bone, or purulent drainage. Mild increase in skin temp, and erythema surrounding the amp site.     Lymph nodes: Cervical and supraclavicular normal   Musculoskeletal: No swelling or deformity   Lines/Devices:  Intact, no erythema, drainage or tenderness   Psych: Alert and oriented, normal mood affect given the setting         Data Review:   Recent Results (from the past 24 hour(s))   METABOLIC PANEL, BASIC    Collection Time: 17 10:30 PM   Result Value Ref Range    Sodium 138 136 - 145 mmol/L    Potassium 4.0 3.5 - 5.1 mmol/L    Chloride 104 97 - 108 mmol/L    CO2 22 21 - 32 mmol/L    Anion gap 12 5 - 15 mmol/L    Glucose 330 (H) 65 - 100 mg/dL    BUN 17 6 - 20 MG/DL    Creatinine 1.21 0.70 - 1.30 MG/DL    BUN/Creatinine ratio 14 12 - 20      GFR est AA >60 >60 ml/min/1.73m2    GFR est non-AA >60 >60 ml/min/1.73m2    Calcium 7.9 (L) 8.5 - 10.1 MG/DL   GLUCOSE, POC    Collection Time: 03/17/17 11:26 PM   Result Value Ref Range    Glucose (POC) 322 (H) 65 - 100 mg/dL    Performed by Richard Castillo          Impression:     Staph and Pseudomonas infection to the left lateral foot amputation site. Pending PICC line placement. Recommendation:     -Patient seen and examined today  -Will begin santyl on the wound while in house daily nickel thickness   -Will be pending PIC line placement for IV antibiotics to be dispensed. If unable to get home health may need infusion center tx daily.   -Discussed with pharmacy about the antibiotics, and will plan for Vancomycin, and Maxipime while in house to cover organisms.   -Will obtain Xrays of the left foot.   -Pt to be WBAT to the foot in postop shoe.

## 2017-03-18 NOTE — PROGRESS NOTES
PICC (Peripherally Inserted Central Catheter) line insertion  procedure note :     Procedure explained to patient along with risks and benefits  and patient agreed to proceed. Informed consent obtained from  patient. Patient teaching completed. Timeout completed. Pre-procedure assessment done. Maximum sterile barrier precautions observed throughout procedure. Lidocaine 1%  3.0    ml sq given prior to cannulation. Cannulated basilic  vein using ultrasound guidance and modified seldinger technique. Inserted 5  Hungarian double  lumen PICC to right arm using Packback Tip Location System and  [address]. Pt has    sinus   rhythm. PICC tip location was confirmed by 3 CG tip positioning system, indicating tall P wave and no negative deflection before P wave which would indicate that the PICC tip is properly placed in the distal SVC or at the [place]. PICC tip location was  confirmed by 2 PICC nurses and 3CG printout placed on patient's chart. Blood return verified and flushed with 20 ml normal saline in each port. Sterile dressing applied with biopatch, statLock and occlusive dressing as per protocol. Curos caps applied to each port. Patient tolerated procedure well with minimal blood loss ( less than 5 ml.)   Patient education material provided. PICC procedure performed by  :  Colby Gillette RN. PICC nurse  Assisted by : Hayley Weems RN  PICC nurse  Reason for access : reliable access / MD order /Long term IV medication administration   Complications related to insertion  : none  X-Ray : not applicable  Notified primary nurse  Natalie Hernandez RN  that  PICC line can be used. Total Trimmed Length :  44   cm   External Length : 0  cm   PICC line site arm circumference:  31    cm   PICC catheter occupies  12   % of vein  Type of PICC: Bard Solo Power PICC   Ref # :     [id number]    Lot # :  [id number]   Expiration Date :    2018-03-31     Colby Gillette RN. BSN. CRNI,CMSRN.  Clinician IV . PICC Nurse, Vascular Access Team.

## 2017-03-18 NOTE — PROGRESS NOTES
Empagliflozin (JARDIANCE) Drug name, strength, sig    There is an increased risk of UTIs/CAUTIs in patients receiving SGLT inhibitors (i.e., Empagliflozin, Canagliflozin, Dapagliflozin); therefore these agents are not recommended to be administered to inpatients. This patient is currently receiving a correctional insulin order, which is recommended to replace the use of SGLT inhibitors for inpatients.     I removed Empagliflozin (Palmer Nunez)  from the [facility]

## 2017-03-19 VITALS
HEIGHT: 70 IN | OXYGEN SATURATION: 99 % | BODY MASS INDEX: 30.8 KG/M2 | SYSTOLIC BLOOD PRESSURE: 145 MMHG | WEIGHT: 215.17 LBS | TEMPERATURE: 98.1 F | DIASTOLIC BLOOD PRESSURE: 59 MMHG | HEART RATE: 73 BPM | RESPIRATION RATE: 18 BRPM

## 2017-03-19 LAB
GLUCOSE BLD STRIP.AUTO-MCNC: 160 MG/DL (ref 65–100)
GLUCOSE BLD STRIP.AUTO-MCNC: 162 MG/DL (ref 65–100)
GLUCOSE BLD STRIP.AUTO-MCNC: 187 MG/DL (ref 65–100)
SERVICE CMNT-IMP: ABNORMAL

## 2017-03-19 PROCEDURE — 96366 THER/PROPH/DIAG IV INF ADDON: CPT

## 2017-03-19 PROCEDURE — 74011000258 HC RX REV CODE- 258: Performed by: PODIATRIST

## 2017-03-19 PROCEDURE — 82962 GLUCOSE BLOOD TEST: CPT

## 2017-03-19 PROCEDURE — G0378 HOSPITAL OBSERVATION PER HR: HCPCS

## 2017-03-19 PROCEDURE — 99218 HC RM OBSERVATION: CPT

## 2017-03-19 PROCEDURE — 74011250637 HC RX REV CODE- 250/637: Performed by: PODIATRIST

## 2017-03-19 PROCEDURE — 74011250636 HC RX REV CODE- 250/636: Performed by: PODIATRIST

## 2017-03-19 RX ADMIN — METFORMIN HYDROCHLORIDE 1000 MG: 500 TABLET, FILM COATED ORAL at 08:20

## 2017-03-19 RX ADMIN — COLLAGENASE SANTYL: 250 OINTMENT TOPICAL at 08:24

## 2017-03-19 RX ADMIN — GLYBURIDE 5 MG: 5 TABLET ORAL at 08:20

## 2017-03-19 RX ADMIN — Medication 10 ML: at 13:22

## 2017-03-19 RX ADMIN — INSULIN LISPRO 2 UNITS: 100 INJECTION, SOLUTION INTRAVENOUS; SUBCUTANEOUS at 08:21

## 2017-03-19 RX ADMIN — Medication 10 ML: at 06:24

## 2017-03-19 RX ADMIN — GLYBURIDE 5 MG: 5 TABLET ORAL at 12:08

## 2017-03-19 RX ADMIN — CEFEPIME 2 G: 2 INJECTION, POWDER, FOR SOLUTION INTRAVENOUS at 13:21

## 2017-03-19 RX ADMIN — LINAGLIPTIN 2.5 MG: 5 TABLET, FILM COATED ORAL at 08:21

## 2017-03-19 RX ADMIN — Medication 10 ML: at 13:23

## 2017-03-19 RX ADMIN — VANCOMYCIN HYDROCHLORIDE 1500 MG: 10 INJECTION, POWDER, LYOPHILIZED, FOR SOLUTION INTRAVENOUS at 03:39

## 2017-03-19 RX ADMIN — INSULIN LISPRO 2 UNITS: 100 INJECTION, SOLUTION INTRAVENOUS; SUBCUTANEOUS at 11:30

## 2017-03-19 RX ADMIN — LISINOPRIL 5 MG: 5 TABLET ORAL at 08:20

## 2017-03-19 RX ADMIN — THERA TABS 1 TABLET: TAB at 08:20

## 2017-03-19 RX ADMIN — CEFEPIME 2 G: 2 INJECTION, POWDER, FOR SOLUTION INTRAVENOUS at 06:24

## 2017-03-19 RX ADMIN — Medication 10 ML: at 12:09

## 2017-03-19 RX ADMIN — SODIUM CHLORIDE 50 ML/HR: 900 INJECTION, SOLUTION INTRAVENOUS at 06:28

## 2017-03-19 NOTE — PROGRESS NOTES
Orthopedic End of Shift Note    Bedside and Verbal shift change report given to Donald Georges RN (oncoming nurse) by Brandon Castañeda RN (offgoing nurse). Report included the following information SBAR, Kardex, Procedure Summary, Intake/Output and MAR. Issues for Physician to address Patient requests to be discharged today    Nausea/Vomiting [] yes [x] no     Collins Catheter [] in [x] removed    Bowel Movements [] yes [x] no     Foot Pumps or SCD [] yes [x] no    Ice Pack [] yes    [x] no    Incentive Spirometer [] yes [x] no    Supplemental O2 [] yes [x] no Sat off O2:   96     Patient Vitals for the past 12 hrs:   Temp Pulse Resp BP SpO2   03/19/17 0343 97.6 °F (36.4 °C) 71 14 120/62 100 %   03/19/17 0007 98.4 °F (36.9 °C) 70 16 188/51 97 %     Lab Results   Component Value Date/Time    HGB 12.8 03/18/2017 03:39 AM    HCT 38.5 03/18/2017 03:39 AM     Lab Results   Component Value Date/Time    INR 1.1 01/17/2017 05:32 PM       Intake/Output Summary (Last 24 hours) at 03/19/17 0733  Last data filed at 03/19/17 0640   Gross per 24 hour   Intake                0 ml   Output             1025 ml   Net            -1025 ml     Wound Toe Right (Active)   Number of days:55       Wound Foot Left (Active)   Number of days:55       Wound Foot Left (Active)   Number of days:13            Peripheral Intravenous Line:  Peripheral IV 03/17/17 Right; Inner Forearm (Active)   Site Assessment Clean, dry, & intact 3/17/2017  7:30 PM   Phlebitis Assessment 0 3/17/2017  7:30 PM   Infiltration Assessment 0 3/17/2017  7:30 PM   Dressing Status Clean, dry, & intact 3/17/2017  7:30 PM   Dressing Type Transparent;Tape 3/17/2017  7:30 PM   Hub Color/Line Status Blue; Infusing 3/17/2017  7:30 PM

## 2017-03-19 NOTE — PROGRESS NOTES
CM met with pt to provide observation letter and CM placed copy on bedside chart. CM advised pt of infusion provider needed in the home at time of discharge. CM placed referral for home antibiotics to [facility]. [phone]. CM updated AVS.     Pt lives alone in a two story home with 16 steps to the entrance of his residence. Pt has four steps to the entrance of his residence. Pt is independent to include driving. Pt had previous HH with [facility]. Pt has no providers for SNF or DME. Pt uses [facility] [address] on [address] [employer] in [address]. Pt will provide his own transportation at discharge. CM was advised pt has been accepted to [facility] for home infusion services. Pt is scheduled to discharge today. Care Management Interventions  PCP Verified by CM: Yes (Dr. Elis Dove )  Mode of Transport at Discharge: Self (private vehicle )  Transition of Care Consult (CM Consult):  Infusion Center ([facility] )  Discharge Durable Medical Equipment: No  Physical Therapy Consult: No  Occupational Therapy Consult: No  Speech Therapy Consult: No  Current Support Network: Lives Alone  Confirm Follow Up Transport: Self  Discharge Location  Discharge Placement: Home with infusion therapy    [facility], [address] [place] [address]   [phone]

## 2017-03-19 NOTE — PROGRESS NOTES
Reviewed discharge instructions, prescriptions, and side effects with patient. Reviewed medication instructions. Answered all questions and provided contact information for future questions. PICC line left in place. Going home in a car with infusions set up for home via PICC line. Patient refused wheelchair once in the lobby.  Walked from the lobby

## 2017-03-19 NOTE — PROGRESS NOTES
Orthopedic End of Shift Note    Bedside shift change report given to Jayesh Sanchez (oncoming nurse) by Khalida Wharton RN (offgoing nurse). Report included the following information SBAR, Kardex, Procedure Summary, Intake/Output, MAR and Recent Results. POD#     Significant issues during shift. Issues for Physician to addres. Nausea/Vomiting [] yes [x] no     Collins Catheter [] in [x] removed    Bowel Movements [] yes [x] no     Foot Pumps or SCD [] yes [x] no    Ice Pack [] yes    [x] no    Incentive Spirometer [] yes [x] no    Supplemental O2 [] yes [] no . Patient Vitals for the past 12 hrs:   Temp Pulse Resp BP SpO2   03/18/17 1928 98.1 °F (36.7 °C) 73 16 132/64 98 %   03/18/17 1649 97.8 °F (36.6 °C) 71 - 152/62 100 %   03/18/17 0950 - 71 - 135/70 -     Lab Results   Component Value Date/Time    HGB 12.8 03/18/2017 03:39 AM    HCT 38.5 03/18/2017 03:39 AM     Lab Results   Component Value Date/Time    INR 1.1 01/17/2017 05:32 PM       Intake/Output Summary (Last 24 hours) at 03/18/17 2006  Last data filed at 03/18/17 0058   Gross per 24 hour   Intake                0 ml   Output              650 ml   Net             -650 ml     Wound Toe Right (Active)   Number of days:54       Wound Foot Left (Active)   Number of days:54       Wound Foot Left (Active)   Number of days:12            Peripheral Intravenous Line:  Peripheral IV 03/17/17 Right; Inner Forearm (Active)   Site Assessment Clean, dry, & intact 3/17/2017  7:30 PM   Phlebitis Assessment 0 3/17/2017  7:30 PM   Infiltration Assessment 0 3/17/2017  7:30 PM   Dressing Status Clean, dry, & intact 3/17/2017  7:30 PM   Dressing Type Transparent;Tape 3/17/2017  7:30 PM   Hub Color/Line Status Blue; Infusing 3/17/2017  7:30 PM     Drain(s): Nicole Leigh

## 2017-03-19 NOTE — H&P
Podiatry Progress Note    Subjective:         Patient is a 59 y.o.  male who is being seen for left diabetic foot infection with preivous osteomyelitis. Patient doing well. Has PICC line that was placed yesterday. Denies n/f/v/c. Has been using santyl to the wound. Patient Active Problem List    Diagnosis Date Noted    Diabetes mellitus with neuropathy ([facility] [address] 01/17/2017    Osteomyelitis of left foot ([facility] [address] 01/17/2017    Osteomyelitis of right foot ([facility] [address] 01/17/2017     Past Medical History:   Diagnosis Date    Diabetes ([facility] [address]     High cholesterol     Hypertension     Nausea & vomiting       No family history on file. Social History   Substance Use Topics    Smoking status: Never Smoker    Smokeless tobacco: Not on file    Alcohol use No     Past Surgical History:   Procedure Laterality Date    HX CATARACT REMOVAL Bilateral 2012    HX ORTHOPAEDIC Right     great toe amputation    HX ORTHOPAEDIC Left     2 small toes amputated      Prior to Admission medications    Medication Sig Start Date End Date Taking? Authorizing Provider   sAXagliptin-metFORMIN (KOMBIGLYZE XR) 2.5-1,000 mg TM24 Take 2.5-1,000 Tabs by mouth two (2) times a day. Indications: type 2 diabetes mellitus   Yes Historical Provider   pravastatin (PRAVACHOL) 20 mg tablet Take 20 mg by mouth nightly. Yes Ofelia Arellano MD   lisinopril (PRINIVIL, ZESTRIL) 5 mg tablet Take 5 mg by mouth daily. Yes Ofelia Arellano MD   glyBURIDE (DIABETA) 5 mg tablet Take 5 mg by mouth three (3) times daily. Yes Ofelia rAellano MD   multivitamin (ONE A DAY) tablet Take 1 Tab by mouth daily. Yes Ofelia Arellano MD   empagliflozin (JARDIANCE) 10 mg tablet Take  by mouth daily. Yes Ofelia Arellano MD   oxyCODONE-acetaminophen (PERCOCET) 5-325 mg per tablet Take 1-2 Tabs by mouth every four (4) hours as needed. Max Daily Amount: 12 Tabs. 1/21/17   Issac Singh DPM   aspirin 81 mg chewable tablet Take 81 mg by mouth daily.     Ofelia Arellano MD     No Known Allergies     Review of Systems:  A comprehensive review of systems was negative except for that written in the HPI. Objective:     Patient Vitals for the past 8 hrs:   BP Temp Pulse Resp SpO2   17 0738 139/64 97.8 °F (36.6 °C) 66 16 96 %   17 0343 120/62 97.6 °F (36.4 °C) 71 14 100 %     Temp (24hrs), Av.9 °F (36.6 °C), Min:97.6 °F (36.4 °C), Max:98.4 °F (36.9 °C)      General:  Alert, cooperative, well noursished, well developed, appears stated age   Eyes:  Sclera anicteric. Pupils equally round and reactive to light. Mouth/Throat: Mucous membranes normal, oral pharynx clear   Neck: Supple   Lungs:   Clear to auscultation bilaterally, good effort   CV:  Regular rate and rhythm,no murmur, click, rub or gallop   Abdomen:   Soft, non-tender. bowel sounds normal. non-distended   Extremities: Mild erythema and swelling   Skin: Left lateral foot incisional wound  With superficial skin ulceration. No deep probing to bone, or purulent drainage. Mild increase in skin temp, and erythema surrounding the amp site.     Lymph nodes: Cervical and supraclavicular normal   Musculoskeletal: No swelling or deformity   Lines/Devices:  Intact, no erythema, drainage or tenderness   Psych: Alert and oriented, normal mood affect given the setting         Data Review:   Recent Results (from the past 24 hour(s))   GLUCOSE, POC    Collection Time: 17 11:27 AM   Result Value Ref Range    Glucose (POC) 250 (H) 65 - 100 mg/dL    Performed by Alesha Elizabeth    GLUCOSE, POC    Collection Time: 17  4:44 PM   Result Value Ref Range    Glucose (POC) 170 (H) 65 - 100 mg/dL    Performed by Alberto Cerna    GLUCOSE, POC    Collection Time: 17  9:08 PM   Result Value Ref Range    Glucose (POC) 160 (H) 65 - 100 mg/dL    Performed by Carolyn Stewart (PCT)    GLUCOSE, POC    Collection Time: 17  7:35 AM   Result Value Ref Range    Glucose (POC) 187 (H) 65 - 100 mg/dL    Performed by Velvet Goldstein Impression:     Staph and Pseudomonas infection to the left lateral foot amputation site. Pending PICC line placement.        Recommendation:     -Patient seen and examined today  -Continue santyl on the wound while in house daily nickel thickness   -Case management working on antibiotic outpatient infusion therapy.   -Pharmacy recommending Cefepime 2g IV for 12 days outpatient therapy.   -Continue Vancomycin, and Maxipime while in house to cover organisms.   -Will obtain Xrays of the left foot.   -Pt to be WBAT to the foot in postop shoe.   -Will plan for discharge today

## 2017-03-19 NOTE — DISCHARGE SUMMARY
Physician Discharge Summary     Patient ID:  Olvin Ellis  [id number]  21 y.o.  1955    Admit date: 3/17/2017    Discharge date: 3/19/2017      Admitting Physician: Nancy Baarjas DPM     Discharge Physician: Nancy Barajas DPM    * Admission Diagnoses: bacteremia left foot  bacteremia left foot    * Discharge Diagnoses:   Hospital Problems as of 3/19/2017  Date Reviewed: 3/6/2017    None          * Procedures for this admission: * No surgery found *      * Discharged Condition: good    [facility] Course: Patient admitted for PICC placement and arrangement of IV antibiotics. Patient tolerated PICC placement well. Case management currently arranging home health to begin IV infusion of abx tomorrow.  Plan for discharge today    Consults: None    Significant Diagnostic Studies:     Recent Results (from the past 24 hour(s))   GLUCOSE, POC    Collection Time: 03/18/17 11:27 AM   Result Value Ref Range    Glucose (POC) 250 (H) 65 - 100 mg/dL    Performed by Alesha Elizabeth    GLUCOSE, POC    Collection Time: 03/18/17  4:44 PM   Result Value Ref Range    Glucose (POC) 170 (H) 65 - 100 mg/dL    Performed by Alberto Cerna    GLUCOSE, POC    Collection Time: 03/18/17  9:08 PM   Result Value Ref Range    Glucose (POC) 160 (H) 65 - 100 mg/dL    Performed by Carolyn KangPCT)    GLUCOSE, POC    Collection Time: 03/19/17  7:35 AM   Result Value Ref Range    Glucose (POC) 187 (H) 65 - 100 mg/dL    Performed by Velvet Goldstein          Treatments: IV hydration and antibiotics: vancomycin and Maxipime    Discharge Exam:   GEN: Awake, alert   NECK: Full Range of motion, no cervical adenopathy, no carotid bruit and no jugular vein distension  LUNGS: Clear to auscultation bilaterally  HEART: Regular rate and rhythm, no murmurs  ABDOMEN: Soft, non-tender,Bowel sounds normal, No masses, No organomegaly  PULSES: dorsalis pedis R:2+ (normal)/L:2+ (normal), posterior tibial R:2+ (normal)/L:2+ (normal)  SKIN: Skin color, texture, turgor normal. No rashes or lesions  NEUROLOGIC: CNII-XII intact. Normal strength, sensation and reflexes throughout, mood affect within normal limits  WOUND: healing well, granulation tissue present      * Disposition: Home    Discharge Medications:   Current Discharge Medication List      CONTINUE these medications which have NOT CHANGED    Details   sAXagliptin-metFORMIN (KOMBIGLYZE XR) 2.5-1,000 mg TM24 Take 2.5-1,000 Tabs by mouth two (2) times a day. Indications: type 2 diabetes mellitus      pravastatin (PRAVACHOL) 20 mg tablet Take 20 mg by mouth nightly. lisinopril (PRINIVIL, ZESTRIL) 5 mg tablet Take 5 mg by mouth daily. glyBURIDE (DIABETA) 5 mg tablet Take 5 mg by mouth three (3) times daily. multivitamin (ONE A DAY) tablet Take 1 Tab by mouth daily. empagliflozin (JARDIANCE) 10 mg tablet Take  by mouth daily. oxyCODONE-acetaminophen (PERCOCET) 5-325 mg per tablet Take 1-2 Tabs by mouth every four (4) hours as needed. Max Daily Amount: 12 Tabs. Qty: 40 Tab, Refills: 0      aspirin 81 mg chewable tablet Take 81 mg by mouth daily. * Follow-up Care/Patient Instructions: Activity: Activity as tolerated  Diet: Regular Diet  Wound Care: Keep wound clean and dry and Santyl applied to the woudn every day nickel thickness with saline moistened gauze.  Follow up in my office on Friday afternoon    Follow-up Information     Follow up With Details Comments 3 [address] [place] [address] MD Annette Urias [address]  [address] [address] [phone]            Signed:  Radha Brito DPM  3/19/2017  11:02 AM

## 2018-10-19 PROBLEM — M86.9 OSTEOMYELITIS OF RIGHT FOOT (HCC): Status: RESOLVED | Noted: 2017-01-17 | Resolved: 2018-10-19

## 2018-10-19 PROBLEM — M86.9 OSTEOMYELITIS OF LEFT FOOT (HCC): Status: RESOLVED | Noted: 2017-01-17 | Resolved: 2018-10-19

## 2018-10-19 PROBLEM — I10 ESSENTIAL HYPERTENSION: Status: ACTIVE | Noted: 2018-10-19

## 2018-10-19 PROBLEM — E78.49 OTHER HYPERLIPIDEMIA: Status: ACTIVE | Noted: 2018-10-19

## 2019-05-09 PROBLEM — E11.40 DIABETES MELLITUS WITH NEUROPATHY (HCC): Status: RESOLVED | Noted: 2017-01-17 | Resolved: 2019-05-09

## 2021-03-24 ENCOUNTER — OFFICE VISIT (OUTPATIENT)
Dept: ENDOCRINOLOGY | Age: 66
End: 2021-03-24
Payer: MEDICARE

## 2021-03-24 VITALS
HEART RATE: 65 BPM | BODY MASS INDEX: 35.22 KG/M2 | RESPIRATION RATE: 14 BRPM | SYSTOLIC BLOOD PRESSURE: 140 MMHG | WEIGHT: 242 LBS | DIASTOLIC BLOOD PRESSURE: 80 MMHG

## 2021-03-24 DIAGNOSIS — I10 ESSENTIAL HYPERTENSION: ICD-10-CM

## 2021-03-24 DIAGNOSIS — E11.9 TYPE 2 DIABETES MELLITUS WITHOUT COMPLICATION, WITH LONG-TERM CURRENT USE OF INSULIN (HCC): Primary | ICD-10-CM

## 2021-03-24 DIAGNOSIS — E78.2 MIXED HYPERLIPIDEMIA: ICD-10-CM

## 2021-03-24 DIAGNOSIS — Z79.4 TYPE 2 DIABETES MELLITUS WITHOUT COMPLICATION, WITH LONG-TERM CURRENT USE OF INSULIN (HCC): Primary | ICD-10-CM

## 2021-03-24 PROCEDURE — G8432 DEP SCR NOT DOC, RNG: HCPCS | Performed by: INTERNAL MEDICINE

## 2021-03-24 PROCEDURE — G8536 NO DOC ELDER MAL SCRN: HCPCS | Performed by: INTERNAL MEDICINE

## 2021-03-24 PROCEDURE — 3046F HEMOGLOBIN A1C LEVEL >9.0%: CPT | Performed by: INTERNAL MEDICINE

## 2021-03-24 PROCEDURE — G8417 CALC BMI ABV UP PARAM F/U: HCPCS | Performed by: INTERNAL MEDICINE

## 2021-03-24 PROCEDURE — G8753 SYS BP > OR = 140: HCPCS | Performed by: INTERNAL MEDICINE

## 2021-03-24 PROCEDURE — G8754 DIAS BP LESS 90: HCPCS | Performed by: INTERNAL MEDICINE

## 2021-03-24 PROCEDURE — 3017F COLORECTAL CA SCREEN DOC REV: CPT | Performed by: INTERNAL MEDICINE

## 2021-03-24 PROCEDURE — G8427 DOCREV CUR MEDS BY ELIG CLIN: HCPCS | Performed by: INTERNAL MEDICINE

## 2021-03-24 PROCEDURE — 1101F PT FALLS ASSESS-DOCD LE1/YR: CPT | Performed by: INTERNAL MEDICINE

## 2021-03-24 PROCEDURE — 99205 OFFICE O/P NEW HI 60 MIN: CPT | Performed by: INTERNAL MEDICINE

## 2021-03-24 PROCEDURE — 2022F DILAT RTA XM EVC RTNOPTHY: CPT | Performed by: INTERNAL MEDICINE

## 2021-03-24 RX ORDER — PEN NEEDLE, DIABETIC 30 GX3/16"
NEEDLE, DISPOSABLE MISCELLANEOUS
Qty: 500 PEN NEEDLE | Refills: 3 | Status: SHIPPED | OUTPATIENT
Start: 2021-03-24 | End: 2021-09-15 | Stop reason: SDUPTHER

## 2021-03-24 RX ORDER — INSULIN GLARGINE 100 [IU]/ML
INJECTION, SOLUTION SUBCUTANEOUS
Qty: 90 ML | Refills: 11 | Status: SHIPPED | OUTPATIENT
Start: 2021-03-24 | End: 2021-10-18 | Stop reason: SDUPTHER

## 2021-03-24 RX ORDER — INSULIN LISPRO 100 [IU]/ML
INJECTION, SOLUTION INTRAVENOUS; SUBCUTANEOUS
Qty: 30 ML | Refills: 5 | Status: SHIPPED | OUTPATIENT
Start: 2021-03-24 | End: 2021-11-09

## 2021-03-24 NOTE — PATIENT INSTRUCTIONS
1) Lantus 45 units in the morning and 35 units at bedtime. 2) Humalog 15 units with meals plus the correction. 3) Humalog correction 150-199 2 additional units 200-249 4 additional units 250-299 6 additional units 300-349 8 additional units 350-399 10 additional units 400-449 12 additional units 450-499 14 additional units 500-549 16 additional units 550+ 18 additional units

## 2021-03-24 NOTE — PROGRESS NOTES
Chief Complaint   Patient presents with    Diabetes     pcp and pharmacy verified. IN PERSON     History of Present Illness: Don Domingo is a 77 y.o. male who is a new patient for evaluation of diabetes. Was diagnosed with diabetes 1993 . He recently saw Dr. Cale Wyatt but they did not do well clinically so he asked for a new endocrinologist.  Will request records from Dr. Cale Wyatt. Current regimen is Lantus 35 units BID and Humalog 12-20 units twice per day. He takes the humalog with breakfast and \"generally after dinner\". He denies missing or skipping any dose of his insulin. Checks blood sugars 2 times per day and readings run 200's fasting and 300-400s prior to dinner. Most recent Hgb A1c was 10.4% in December 2020. A typical day is as follows:  Pt wakes around 730-8AM. He will test his sugar when he gets up and take his Lantus and Humalog at that time. - He has breakfast around 8-9AM, this AM he had yogurt with granola and nothing to drink.  - He will have an apple or a cookie \"or something sweet\" in the mid-morning.  - He will have lunch around 1PM, yesterday he had an egg salad sandwich, no side items and water. - He will will have an afternoon snack of \"something\". - He has dinner around 530PM, last night he had a salad and water. - He tries to not snack after dinner. Exercise consists of \"building a house and planting a tomato garden\". Pt is s/p right BKA in March 2018. \"I had a diabetic infection that went south\". No hx of MI or CVA. Pt has hx of neuropathy \"but it is not bad\" and he is not taking any agents for this. Pt denies hx of nephropathy. Last eye exam was 2019, no hx of retinopathy.     Pt notes he has been through DM education in the past.    Past Medical History:   Diagnosis Date    Diabetes ([address]     High cholesterol     Hypertension     Nausea & vomiting     Osteomyelitis of right foot ([address] 1/17/2017     Past Surgical History:   Procedure Laterality Date    HX CATARACT REMOVAL Bilateral 2012    HX ORTHOPAEDIC Right     great toe amputation    HX ORTHOPAEDIC Left     2 small toes amputated    HX ORTHOPAEDIC Right 03/27/2019    BKA     Current Outpatient Medications   Medication Sig    Insulin Needles, Disposable, (BD Ultra-Fine Short Pen Needle) 31 gauge x 5/16\" ndle Use on insulin pen 5 times per day. DX: E11.40    insulin lispro (HUMALOG) 100 unit/mL kwikpen 15-25 units with each meal.    insulin glargine (Lantus Solostar U-100 Insulin) 100 unit/mL (3 mL) inpn Takes 45 units in the morning and 35 units at bedtime. Indications: type 2 diabetes mellitus    BD Karine 2nd Gen Pen Needle 32 gauge x 5/32\" ndle Use twice daily as directed  DX E 11.65    amLODIPine (NORVASC) 5 mg tablet Take 1 Tab by mouth daily. Indications: high blood pressure    pravastatin (PRAVACHOL) 20 mg tablet Take 1 Tab by mouth nightly. Indications: High cholesterol    carvediloL (COREG) 12.5 mg tablet Take 1 Tab by mouth two (2) times a day.  Blood Sugar Diagnostic, Drum (Accu-Chek Compact Plus Test) strp 1 Each by Does Not Apply route four (4) times daily as needed for Other (bllod glucose). 3 x 17   Indications: IDDM    lancets (Accu-Chek Multiclix Lancet) misc by SubCUTAneous route Before breakfast, lunch, dinner and at bedtime.  multivitamin (ONE A DAY) tablet Take 1 Tab by mouth daily. Not a current medication    LANCETS by Does Not Apply route. Fast Clix lancets    Lancing Device (LANCING DEVICE WITH LANCETS) misc 1 Each by Does Not Apply route four (4) times daily.  Blood-Glucose Meter (ACCU-CHEK SANDI PLUS METER) misc 1 Units by SubCUTAneous route Before breakfast, lunch, dinner and at bedtime.  aspirin 81 mg chewable tablet Take 81 mg by mouth daily. Not a current medication     No current facility-administered medications for this visit.       No Known Allergies  Family History   Problem Relation Age of Onset    Heart Disease Mother     No Known Problems Father     Lung Disease Sister     No Known Problems Brother     No Known Problems Sister     No Known Problems Sister      Social History     Socioeconomic History    Marital status:      Spouse name: Not on file    Number of children: Not on file    Years of education: Not on file    Highest education level: Not on file   Occupational History    Not on file   Social Needs    Financial resource strain: Not on file    Food insecurity     Worry: Not on file     Inability: Not on file   [employer] needs     Medical: Not on file     Non-medical: Not on file   Tobacco Use    Smoking status: Never Smoker    Smokeless tobacco: Never Used   Substance and Sexual Activity    Alcohol use: No    Drug use: No    Sexual activity: Not on file   Lifestyle    Physical activity     Days per week: Not on file     Minutes per session: Not on file    Stress: Not on file   Relationships    Social connections     Talks on phone: Not on file     Gets together: Not on file     Attends Congregation service: Not on file     Active member of club or organization: Not on file     Attends meetings of clubs or organizations: Not on file     Relationship status: Not on file    Intimate partner violence     Fear of current or ex partner: Not on file     Emotionally abused: Not on file     Physically abused: Not on file     Forced sexual activity: Not on file   Other Topics Concern    Not on file   Social History Narrative    Not on file     Review of Systems:  - Constitutional Symptoms: no fevers, chills, weight loss  - Eyes: no blurry vision or double vision  - Cardiovascular: no chest pain or palpitations  - Respiratory: no cough or shortness of breath  - Gastrointestinal: no dysphagia or abdominal pain  - Musculoskeletal: no joint pains or weakness  - Integumentary: no rashes  - Neurological: no numbness, tingling, or headaches  - Psychiatric: no depression or anxiety  - Endocrine: no heat or cold intolerance, no polyuria or polydipsia    Physical Examination:  Blood pressure (!) 140/80, pulse 65, resp. rate 14, weight 242 lb (109.8 kg). - General: pleasant, no distress, good eye contact  - HEENT: no exopthalmos, no periorbital edema, no scleral/conjunctival injection, EOMI, no lid lag or stare  - Neck: supple, no thyromegaly, masses, lymph nodes, or carotid bruits, no supraclavicular or dorsocervical fat pads  - Cardiovascular: regular, normal rate, normal S1 and S2, no murmurs/rubs/gallops, 2+ dorsalis pedis pulses bilaterally  - Respiratory: clear to auscultation bilaterally  - Gastrointestinal: soft, nontender, nondistended, no masses, no hepatosplenomegaly  - Musculoskeletal: no proximal muscle weakness in upper or lower extremities  - Integumentary: no acanthosis nigricans, no abdominal striae, no rashes, no edema, no foot ulcers  - Neurological: absent sensation to monofilament 4/4 locations, absent vibratory sensation at great toe in left foot.   - Psychiatric: normal mood and affect    Right foot surgically absent (BKA)  Left foot his lateral two toes are surgically absent    Data Reviewed:   Component      Latest Ref Rng & Units 11/15/2020 11/15/2020 11/15/2020 11/15/2020          12:00 AM 12:00 AM 12:00 AM 12:00 AM   LDL-C, External          87   Creatinine, External         0.90    Hemoglobin A1c, External      %  10.4     Urine Microalbumin, External       34.2        Component      Latest Ref Rng & Units 6/23/2020 6/23/2020 6/23/2020           8:02 AM  8:02 AM  8:02 AM   Glucose      65 - 99 mg/dL   282 (H)   BUN      8 - 27 mg/dL   11   Creatinine      0.76 - 1.27 mg/dL   0.88   GFR est non-AA      >59 mL/min/1.73   90   GFR est AA      >59 mL/min/1.73   103   BUN/Creatinine ratio      10 - 24   13   Sodium      134 - 144 mmol/L   138   Potassium      3.5 - 5.2 mmol/L   4.2   Chloride      96 - 106 mmol/L   103   CO2      20 - 29 mmol/L   21   Calcium      8.6 - 10.2 mg/dL   9.4   Protein, total      6.0 - 8.5 g/dL   6.8   Albumin      3.8 - 4.8 g/dL   4.2   GLOBULIN, TOTAL      1.5 - 4.5 g/dL   2.6   A-G Ratio      1.2 - 2.2   1.6   Bilirubin, total      0.0 - 1.2 mg/dL   0.5   Alk. phosphatase      39 - 117 IU/L   97   AST      0 - 40 IU/L   15   ALT      0 - 44 IU/L   18   Cholesterol, total      100 - 199 mg/dL  135    Triglyceride      0 - 149 mg/dL  189 (H)    HDL Cholesterol      >39 mg/dL  34 (L)    VLDL, calculated      5 - 40 mg/dL  38    LDL, calculated      0 - 99 mg/dL  63    Hemoglobin A1c, (calculated)      4.8 - 5.6 % 10.1 (H)     Estimated average glucose      mg/dL 243         Assessment/Plan:   1. Type 2 diabetes mellitus without complication, with long-term current use of insulin ([address]    2. Essential hypertension    3. Mixed hyperlipidemia    1) His FBGs have been in the 200s and his pre-dinner have been in the 300-400s. We discussed the need to increase his regimen and may need to start him on a lunchtime dose of Humalog as well. For now will change the Lantus to 45 units in the AM and 35 units HS and his Humalog 15 units with meals plus 2:50 correction for BG >150. Pt to check his BGs 3 times per day and send me BG readings in 2 weeks. With his hx of neuropathy and amputations he would benefit from DM shoes and inserts. 2) His BP was at goal today on his current regimen. Will not make any changes at this time. Will order a urine MA    3) Pt to continue the Pravastatin 20mg daily. Will order a lipid panel and CMP. Pt voices understanding and agreement with the plan. RTC 3 months    Patient Instructions   1) Lantus 45 units in the morning and 35 units at bedtime. 2) Humalog 15 units with meals plus the correction.     3) Humalog correction  150-199 2 additional units  200-249 4 additional units  250-299 6 additional units  300-349 8 additional units  350-399 10 additional units  400-449 12 additional units  450-499 14 additional units  500-549 16 additional units  550+ 18 additional units          Copy sent to:

## 2021-03-26 LAB
ALBUMIN SERPL-MCNC: 3.9 G/DL (ref 3.8–4.8)
ALBUMIN/CREAT UR: 30 MG/G CREAT (ref 0–29)
ALBUMIN/GLOB SERPL: 1.4 {RATIO} (ref 1.2–2.2)
ALP SERPL-CCNC: 91 IU/L (ref 39–117)
ALT SERPL-CCNC: 26 IU/L (ref 0–44)
AST SERPL-CCNC: 18 IU/L (ref 0–40)
BILIRUB SERPL-MCNC: 0.4 MG/DL (ref 0–1.2)
BUN SERPL-MCNC: 10 MG/DL (ref 8–27)
BUN/CREAT SERPL: 12 (ref 10–24)
CALCIUM SERPL-MCNC: 9.3 MG/DL (ref 8.6–10.2)
CHLORIDE SERPL-SCNC: 102 MMOL/L (ref 96–106)
CHOLEST SERPL-MCNC: 145 MG/DL (ref 100–199)
CO2 SERPL-SCNC: 24 MMOL/L (ref 20–29)
CREAT SERPL-MCNC: 0.83 MG/DL (ref 0.76–1.27)
CREAT UR-MCNC: 222.6 MG/DL
EST. AVERAGE GLUCOSE BLD GHB EST-MCNC: 229 MG/DL
GLOBULIN SER CALC-MCNC: 2.8 G/DL (ref 1.5–4.5)
GLUCOSE SERPL-MCNC: 185 MG/DL (ref 65–99)
HBA1C MFR BLD: 9.6 % (ref 4.8–5.6)
HDLC SERPL-MCNC: 36 MG/DL
IMP & REVIEW OF LAB RESULTS: NORMAL
LDLC SERPL CALC-MCNC: 78 MG/DL (ref 0–99)
Lab: NORMAL
MICROALBUMIN UR-MCNC: 66.5 UG/ML
POTASSIUM SERPL-SCNC: 3.9 MMOL/L (ref 3.5–5.2)
PROT SERPL-MCNC: 6.7 G/DL (ref 6–8.5)
SODIUM SERPL-SCNC: 140 MMOL/L (ref 134–144)
TRIGL SERPL-MCNC: 180 MG/DL (ref 0–149)
VLDLC SERPL CALC-MCNC: 31 MG/DL (ref 5–40)

## 2021-03-26 NOTE — PROGRESS NOTES
Called pt regarding he labs. Pt to make the changes we discussed to his insulin regimen. Pt to continue his Pravastatin 20mg daily. Pt voices understanding and agreement with the plan.

## 2021-04-01 RX ORDER — CARVEDILOL 12.5 MG/1
12.5 TABLET ORAL 2 TIMES DAILY
Qty: 90 TAB | Refills: 1 | Status: SHIPPED | OUTPATIENT
Start: 2021-04-01 | End: 2021-06-29 | Stop reason: SDUPTHER

## 2021-04-27 ENCOUNTER — TELEPHONE (OUTPATIENT)
Dept: ENDOCRINOLOGY | Age: 66
End: 2021-04-27

## 2021-04-27 NOTE — TELEPHONE ENCOUNTER
Received the following e-mail from pt. From: [email or web address]   Sent: Monday, April 26, 2021 8:59 AM  To: [email or web address]  Subject: diabetes numbers Tristian LOWERYClarence Gerlawalex Benjamin AM test # is generally prior to breakfast  PM test # is generally after dinner                                am              pm      April 12            269               367  April 13            237               160  April 14            251               364  April 15            144               270  April 16            226               175  April 17            302               333     April 18            245               433  April 19            220               373  April 20            190               115  April 21            199               367  April 22            166               237  April 23            168               147  April 24            339               316  April 25            223               373  April 26            201      Malina Acosta  [phone]    I responded with the following email. He is still having a lot of high blood sugars after dinner. Are the 300s in the evening after he has had a high carbohydrate dinner and or a dessert? It looks like we need to make some more adjustments to his insulin regimen, but the variation in his sugar readings also suggests some variation in his carbohydrate intake. For now I want him to increase his Lantus to 45 units in the morning and 40 units at bedtime and increase the Humalog to 18 units with each meal plus the corrections. Send me some more readings in 2 weeks please.

## 2021-05-17 RX ORDER — LANCETS
1 EACH MISCELLANEOUS
Qty: 100 STRIP | Refills: 11 | Status: SHIPPED | OUTPATIENT
Start: 2021-05-17

## 2021-05-20 ENCOUNTER — TELEPHONE (OUTPATIENT)
Dept: FAMILY MEDICINE CLINIC | Age: 66
End: 2021-05-20

## 2021-05-20 NOTE — TELEPHONE ENCOUNTER
----- Message from Emmanuel Bahena sent at 5/20/2021  1:02 PM EDT -----  Regarding: Np Poe/Refill  Medication Refill    Caller (if not patient):n/a      Relationship of caller (if not patient):n/a      Best contact number(s): [phone]      Name of medication and dosage if known: \"accu chek test strips\"      Is patient out of this medication (yes/no): yes       Pharmacy name: [address] listed in chart? (yes/no): yes  Pharmacy phone number: [phone]        Details to clarify the request: Chidi Domingo

## 2021-05-25 ENCOUNTER — TELEPHONE (OUTPATIENT)
Dept: FAMILY MEDICINE CLINIC | Age: 66
End: 2021-05-25

## 2021-05-25 NOTE — TELEPHONE ENCOUNTER
Mr. Ashley Jessica was called, informed of the update that as soon as Moisés Carrero gets the forms, fills them out and completes them,  they will be faxed back to his pharmacy. Mr. Ashley Jessica stated OK and thanks.

## 2021-05-25 NOTE — TELEPHONE ENCOUNTER
FLORENCIO Sargent / Pharmacist, he stated, maybe the patient probably misunderstood what they were saying to him. They did receive the Rx, but due to his insurance being Medicare Part B, some additional information is needed. The forms has already been faxed over to be completed and returned. I stated to him, we will check for the forms and also will be on the out look if they are not here yet  He stated OK and thanks. Mr. Garciaying Ike will be called and informed of the update.

## 2021-06-09 ENCOUNTER — TELEPHONE (OUTPATIENT)
Dept: ENDOCRINOLOGY | Age: 66
End: 2021-06-09

## 2021-06-09 NOTE — TELEPHONE ENCOUNTER
Received an email with his BG logs (see scanned documents). Pt reports he is taking Lantus 45 units in the AM and 35 units PM as well as Humalog 20 units with each meal, plus the 2:50 correction. I will have pt continue the Lantus but increase the Humalog to 24 units with each meal, plus correction and send me more readings in a month.

## 2021-06-29 DIAGNOSIS — E78.49 OTHER HYPERLIPIDEMIA: ICD-10-CM

## 2021-06-29 RX ORDER — PRAVASTATIN SODIUM 20 MG/1
20 TABLET ORAL
Qty: 90 TABLET | Refills: 1 | Status: SHIPPED | OUTPATIENT
Start: 2021-06-29 | End: 2021-11-16 | Stop reason: SDUPTHER

## 2021-06-29 RX ORDER — AMLODIPINE BESYLATE 5 MG/1
5 TABLET ORAL DAILY
Qty: 90 TABLET | Refills: 1 | Status: SHIPPED | OUTPATIENT
Start: 2021-06-29 | End: 2021-11-16 | Stop reason: SDUPTHER

## 2021-06-29 RX ORDER — CARVEDILOL 12.5 MG/1
12.5 TABLET ORAL 2 TIMES DAILY
Qty: 90 TABLET | Refills: 1 | Status: SHIPPED | OUTPATIENT
Start: 2021-06-29 | End: 2021-09-22

## 2021-07-06 ENCOUNTER — TELEPHONE (OUTPATIENT)
Dept: ENDOCRINOLOGY | Age: 66
End: 2021-07-06

## 2021-07-06 NOTE — TELEPHONE ENCOUNTER
Communicated with pt via email, which is pts preferred method. His BGs were still running above goal and his BGs tend to be the highest in the evening. Pt instructed to increase his Humalog with each meal from 24 units to 28 units.

## 2021-08-02 ENCOUNTER — TELEPHONE (OUTPATIENT)
Dept: ENDOCRINOLOGY | Age: 66
End: 2021-08-02

## 2021-08-02 NOTE — TELEPHONE ENCOUNTER
Communicated with pt via email, which is pts preferred method. His BGs were still running above goal and his BGs tend to be the highest in the evening.     Pt instructed to increase his Humalog with each meal from 28 units to 32 units. Pt to continue the lantus 45 units in the AM and 35 units HS.

## 2021-08-30 RX ORDER — BLOOD-GLUCOSE METER
1 EACH MISCELLANEOUS
Qty: 1 EACH | Refills: 0 | Status: SHIPPED | OUTPATIENT
Start: 2021-08-30

## 2021-09-15 ENCOUNTER — OFFICE VISIT (OUTPATIENT)
Dept: ENDOCRINOLOGY | Age: 66
End: 2021-09-15
Payer: MEDICARE

## 2021-09-15 VITALS
WEIGHT: 257.6 LBS | BODY MASS INDEX: 36.88 KG/M2 | SYSTOLIC BLOOD PRESSURE: 133 MMHG | HEART RATE: 67 BPM | DIASTOLIC BLOOD PRESSURE: 66 MMHG | HEIGHT: 70 IN

## 2021-09-15 DIAGNOSIS — E11.9 TYPE 2 DIABETES MELLITUS WITHOUT COMPLICATION, WITH LONG-TERM CURRENT USE OF INSULIN (HCC): Primary | ICD-10-CM

## 2021-09-15 DIAGNOSIS — E78.2 MIXED HYPERLIPIDEMIA: ICD-10-CM

## 2021-09-15 DIAGNOSIS — Z79.4 TYPE 2 DIABETES MELLITUS WITHOUT COMPLICATION, WITH LONG-TERM CURRENT USE OF INSULIN (HCC): Primary | ICD-10-CM

## 2021-09-15 DIAGNOSIS — I10 ESSENTIAL HYPERTENSION: ICD-10-CM

## 2021-09-15 LAB — HBA1C MFR BLD HPLC: 8.3 %

## 2021-09-15 PROCEDURE — G8427 DOCREV CUR MEDS BY ELIG CLIN: HCPCS | Performed by: INTERNAL MEDICINE

## 2021-09-15 PROCEDURE — G8417 CALC BMI ABV UP PARAM F/U: HCPCS | Performed by: INTERNAL MEDICINE

## 2021-09-15 PROCEDURE — G8754 DIAS BP LESS 90: HCPCS | Performed by: INTERNAL MEDICINE

## 2021-09-15 PROCEDURE — 1101F PT FALLS ASSESS-DOCD LE1/YR: CPT | Performed by: INTERNAL MEDICINE

## 2021-09-15 PROCEDURE — G8752 SYS BP LESS 140: HCPCS | Performed by: INTERNAL MEDICINE

## 2021-09-15 PROCEDURE — 3017F COLORECTAL CA SCREEN DOC REV: CPT | Performed by: INTERNAL MEDICINE

## 2021-09-15 PROCEDURE — 3052F HG A1C>EQUAL 8.0%<EQUAL 9.0%: CPT | Performed by: INTERNAL MEDICINE

## 2021-09-15 PROCEDURE — 2022F DILAT RTA XM EVC RTNOPTHY: CPT | Performed by: INTERNAL MEDICINE

## 2021-09-15 PROCEDURE — G8432 DEP SCR NOT DOC, RNG: HCPCS | Performed by: INTERNAL MEDICINE

## 2021-09-15 PROCEDURE — G8536 NO DOC ELDER MAL SCRN: HCPCS | Performed by: INTERNAL MEDICINE

## 2021-09-15 PROCEDURE — 83036 HEMOGLOBIN GLYCOSYLATED A1C: CPT | Performed by: INTERNAL MEDICINE

## 2021-09-15 PROCEDURE — 99214 OFFICE O/P EST MOD 30 MIN: CPT | Performed by: INTERNAL MEDICINE

## 2021-09-15 RX ORDER — PEN NEEDLE, DIABETIC 30 GX3/16"
NEEDLE, DISPOSABLE MISCELLANEOUS
Qty: 500 PEN NEEDLE | Refills: 3 | Status: SHIPPED | OUTPATIENT
Start: 2021-09-15 | End: 2021-10-18 | Stop reason: SDUPTHER

## 2021-09-15 RX ORDER — INSULIN GLARGINE 100 [IU]/ML
INJECTION, SOLUTION SUBCUTANEOUS
Qty: 90 ML | Refills: 5 | Status: SHIPPED | OUTPATIENT
Start: 2021-09-15 | End: 2021-11-16 | Stop reason: SDUPTHER

## 2021-09-15 NOTE — LETTER
9/15/2021    Patient: Cody Chang   YOB: 1955   Date of Visit: 9/15/2021     Jessee Pimentel NP  [place] [address]  [address] [address]  [address] [facility] [address]    Dear Jessee Pimentel NP,      Thank you for referring Mr. Irina Brown to [facility] for evaluation. My notes for this consultation are attached. If you have questions, please do not hesitate to call me. I look forward to following your patient along with you.       Sincerely,    Chantel Jefferson MD

## 2021-09-15 NOTE — PROGRESS NOTES
Chief Complaint   Patient presents with    Diabetes     pcp and pharmacy verified     History of Present Illness: Nilda Santillan is a 74 y.o. male here for follow up of diabetes. He was diagnosed with diabetes 1993 . At our initial visit in March 2021 his regimen consisted of Lantus 35 units BID and Humalog 12-20 units twice per day. He was taking the humalog with breakfast and \"generally after dinner\". His A1C was 9.6% so I instructed him to increase his regiment to Lantus 45 units in the AM and 35 units HS and his Humalog 15 units with meals plus 2:50 correction for BG >150. Mr. Jian Lyon has been sending me his BG readings every month and we have been adjusting his insulin regimen. He is currently taking Lantus 45 units in the AM and 35 units HS and Humalog to 32 units with each meal plus 2:50 correction for BG >150. Pt is testing his BGs 2-3 times per day. His FBGs have been in the 150-250s range with pre diner in the 100-300 range. His A1C today was down to 8.3%. Pt denies any recent illnesses, injuries or hospitalizations. Pt has received both COVID vaccinations ([employer]). \"I had a bout of sciatic nerve and I was out for 5 weeks. I was in the hot tub twice per day and did lots of rest and stretches, which did help. \"    Pt is taking Lantus 45 units in the morning and 35 units HS and Humalog 32 units with breakfast and dinner plus 2:50 correction for BG >150. He denies missing or skipping any dose of his insulin. He has not had any issues of hypoglycemia. A typical day is as follows:  Pt wakes around 730AM. He will test his sugar when he gets up and take his Lantus and Humalog at that time. - He has breakfast around 8-9AM, this AM he had spinach quiche and water. - He will have an apple or a cookie \"or something sweet\" in the mid-morning.  - He will have lunch around 2PM, yesterday he had roast beef, no side items and water.    - He will will have an afternoon snack of \"something\". - He has dinner around 530PM, last night he had  salad and water. - He tries to not snack after dinner. \"If I do then it will cause my sugars to run higher. \"    Exercise consists of \"building a house and planting a tomato garden\". Pt is s/p right BKA in March 2018. \"I had a diabetic infection that went south\". He follows with Dr. Severa [facility] doctor\" in October   No hx of MI or CVA. Pt has hx of neuropathy \"but it is not bad\" and he is not taking any agents for this. Pt denies hx of nephropathy. Last eye exam was 2019, no hx of retinopathy. Pt notes he has been through DM education in the past.    Current Outpatient Medications   Medication Sig    Insulin Needles, Disposable, (BD Ultra-Fine Short Pen Needle) 31 gauge x 5/16\" ndle Use on insulin pen 5 times per day. DX: E11.40    insulin glargine (LANTUS,BASAGLAR) 100 unit/mL (3 mL) inpn 45 units in the morning and 45 units at night (dispense lantus pens)    Blood-Glucose Meter (Accu-Chek Cecille Plus Meter) misc 1 Units by SubCUTAneous route Before breakfast, lunch, dinner and at bedtime.  pravastatin (PRAVACHOL) 20 mg tablet Take 1 Tablet by mouth nightly. Indications: High cholesterol    amLODIPine (NORVASC) 5 mg tablet Take 1 Tablet by mouth daily. Indications: high blood pressure    carvediloL (COREG) 12.5 mg tablet Take 1 Tablet by mouth two (2) times a day.  Blood Sugar Diagnostic, Drum (Accu-Chek Compact Plus Test) strp 1 Each by Does Not Apply route four (4) times daily as needed for Other (bllod glucose). 3 x 17   Indications: IDDM    insulin lispro (HUMALOG) 100 unit/mL kwikpen 15-25 units with each meal. (Patient taking differently: 32 units with breakfast and dinner plus sliding scale)    insulin glargine (Lantus Solostar U-100 Insulin) 100 unit/mL (3 mL) inpn Takes 45 units in the morning and 35 units at bedtime.   Indications: type 2 diabetes mellitus    BD Karine 2nd Gen Pen Needle 32 gauge x 5/32\" ndle Use twice daily as directed  DX E 11.65    lancets (Accu-Chek Multiclix Lancet) misc by SubCUTAneous route Before breakfast, lunch, dinner and at bedtime.  multivitamin (ONE A DAY) tablet Take 1 Tab by mouth daily. Not a current medication    LANCETS by Does Not Apply route. Fast Clix lancets    Lancing Device (LANCING DEVICE WITH LANCETS) misc 1 Each by Does Not Apply route four (4) times daily.  aspirin 81 mg chewable tablet Take 81 mg by mouth daily. Not a current medication     No current facility-administered medications for this visit. No Known Allergies  Review of Systems:  - Eyes: no blurry vision or double vision  - Cardiovascular: no chest pain  - Respiratory: no shortness of breath  - Musculoskeletal: no myalgias  - Neurological: no numbness/tingling in extremities    Physical Examination:  Blood pressure 133/66, pulse 67, height 5' 9.5\" (1.765 m), weight 257 lb 9.6 oz (116.8 kg). - General: pleasant, no distress, good eye contact   - Neck: no carotid bruits  - Cardiovascular: regular, normal rate, nl s1 and s2, no m/r/g, 2+ DP pulses   - Respiratory: clear bilaterally  - Integumentary: no edema, no foot ulcers,   - Psychiatric: normal mood and affect    Diabetic foot exam:     Left Foot:   Visual Exam: callous - present and amputation- Toes 4-5 surgically absent   Pulse DP: 1+ (weak)   Filament test: absent sensation    Vibratory sensation: absent      Right Foot:   surgically absent. Pt has prosthetic. Data Reviewed:   His A1C today was down to 8.3%. Assessment/Plan:   1) DM > His BGs are improving and his A1C is down to 8.3%. Will have pt increase his Lantus to 45 units in the AM and 40 units HS and continue the umalog to 32 units with each meal plus 2:50 correction for BG >150. Pt to test his BGs twice per day and send me BG readings in 4 months. With his hx of neuropathy and amputations he would benefit from DM shoes and inserts. 2) HTN > His BP today was 133/66.  Will not make any changes to his regimen at this time. 3) HLD > His lipid panel was at goal in March 2021. Pt to continue the Pravastatin 20mg daily. Pt voices understanding and agreement with the plan. RTC 4 months    Patient Instructions   Lantus 45 units in the morning and 40 units at bedtime. Follow-up and Dispositions    · Return in about 4 months (around 1/15/2022).          Copy sent to:  Dr. Smith

## 2021-09-22 RX ORDER — CARVEDILOL 12.5 MG/1
TABLET ORAL
Qty: 90 TABLET | Refills: 1 | Status: SHIPPED | OUTPATIENT
Start: 2021-09-22 | End: 2021-11-16 | Stop reason: SDUPTHER

## 2021-10-11 ENCOUNTER — TELEPHONE (OUTPATIENT)
Dept: ENDOCRINOLOGY | Age: 66
End: 2021-10-11

## 2021-10-11 NOTE — TELEPHONE ENCOUNTER
Received BG readings from pt via email (pt's preferred method of communication),  I recommended he increase his Lantus to 45 units BID.

## 2021-10-11 NOTE — TELEPHONE ENCOUNTER
Message routed to provider to approve refill.     Requested Prescriptions     Pending Prescriptions Disp Refills    glucose blood VI test strips (Accu-Chek Guide test strips) strip

## 2021-10-12 RX ORDER — BLOOD SUGAR DIAGNOSTIC
STRIP MISCELLANEOUS
Qty: 100 STRIP | Refills: 5 | Status: SHIPPED | OUTPATIENT
Start: 2021-10-12 | End: 2021-10-18 | Stop reason: SDUPTHER

## 2021-10-19 RX ORDER — BLOOD SUGAR DIAGNOSTIC
STRIP MISCELLANEOUS
Qty: 100 STRIP | Refills: 5 | Status: SHIPPED | OUTPATIENT
Start: 2021-10-19 | End: 2021-12-01 | Stop reason: SDUPTHER

## 2021-10-19 RX ORDER — PEN NEEDLE, DIABETIC 30 GX3/16"
NEEDLE, DISPOSABLE MISCELLANEOUS
Qty: 500 PEN NEEDLE | Refills: 3 | Status: SHIPPED | OUTPATIENT
Start: 2021-10-19 | End: 2021-12-01 | Stop reason: SDUPTHER

## 2021-10-19 RX ORDER — INSULIN GLARGINE 100 [IU]/ML
INJECTION, SOLUTION SUBCUTANEOUS
Qty: 90 ML | Refills: 11 | Status: SHIPPED | OUTPATIENT
Start: 2021-10-19 | End: 2022-06-14 | Stop reason: SDUPTHER

## 2021-11-01 ENCOUNTER — TELEPHONE (OUTPATIENT)
Dept: ENDOCRINOLOGY | Age: 66
End: 2021-11-01

## 2021-11-01 NOTE — TELEPHONE ENCOUNTER
Received BG readings from pt via email (pt's preferred method of communication), and wrote back the following recommendations. \"It looks like his numbers are showing some improvement, but still not where we need them to be. Lets have him increase the Lantus to 50 units in the morning and 50 units at bedtime. Increase his Humalog from 32 units with each meal to 35 units with each meal.  Send me some more reading in a month. \"

## 2021-11-16 DIAGNOSIS — E78.49 OTHER HYPERLIPIDEMIA: ICD-10-CM

## 2021-11-18 RX ORDER — INSULIN GLARGINE 100 [IU]/ML
INJECTION, SOLUTION SUBCUTANEOUS
Qty: 5 ADJUSTABLE DOSE PRE-FILLED PEN SYRINGE | Refills: 3 | Status: SHIPPED | OUTPATIENT
Start: 2021-11-18 | End: 2022-08-09

## 2021-11-18 RX ORDER — INSULIN LISPRO 100 [IU]/ML
INJECTION, SOLUTION INTRAVENOUS; SUBCUTANEOUS
Qty: 60 ML | Refills: 5 | Status: SHIPPED | OUTPATIENT
Start: 2021-11-18 | End: 2021-12-06 | Stop reason: CLARIF

## 2021-11-18 RX ORDER — PRAVASTATIN SODIUM 20 MG/1
20 TABLET ORAL
Qty: 90 TABLET | Refills: 1 | Status: SHIPPED | OUTPATIENT
Start: 2021-11-18 | End: 2021-12-06 | Stop reason: CLARIF

## 2021-11-18 RX ORDER — INSULIN GLARGINE 100 [IU]/ML
INJECTION, SOLUTION SUBCUTANEOUS
Qty: 90 ML | Refills: 5 | Status: SHIPPED | OUTPATIENT
Start: 2021-11-18 | End: 2022-01-27 | Stop reason: SDUPTHER

## 2021-11-18 RX ORDER — AMLODIPINE BESYLATE 5 MG/1
5 TABLET ORAL DAILY
Qty: 90 TABLET | Refills: 1 | Status: SHIPPED | OUTPATIENT
Start: 2021-11-18 | End: 2021-12-29

## 2021-11-18 RX ORDER — CARVEDILOL 12.5 MG/1
12.5 TABLET ORAL 2 TIMES DAILY
Qty: 90 TABLET | Refills: 1 | Status: SHIPPED | OUTPATIENT
Start: 2021-11-18 | End: 2022-01-05

## 2021-12-01 RX ORDER — LANCETS
EACH MISCELLANEOUS
Qty: 1 EACH | Refills: 11 | Status: SHIPPED | OUTPATIENT
Start: 2021-12-01

## 2021-12-01 RX ORDER — BLOOD SUGAR DIAGNOSTIC
STRIP MISCELLANEOUS
Qty: 100 STRIP | Refills: 5 | Status: SHIPPED | OUTPATIENT
Start: 2021-12-01 | End: 2022-02-28 | Stop reason: SDUPTHER

## 2021-12-01 RX ORDER — PEN NEEDLE, DIABETIC 30 GX3/16"
NEEDLE, DISPOSABLE MISCELLANEOUS
Qty: 500 PEN NEEDLE | Refills: 3 | Status: SHIPPED | OUTPATIENT
Start: 2021-12-01 | End: 2022-03-08

## 2021-12-06 RX ORDER — ATORVASTATIN CALCIUM 20 MG/1
20 TABLET, FILM COATED ORAL DAILY
Qty: 90 TABLET | Refills: 3 | Status: SHIPPED | OUTPATIENT
Start: 2021-12-06 | End: 2022-09-20

## 2021-12-06 RX ORDER — INSULIN LISPRO 100 [IU]/ML
INJECTION, SOLUTION INTRAVENOUS; SUBCUTANEOUS
Qty: 60 ML | Refills: 3 | Status: SHIPPED | OUTPATIENT
Start: 2021-12-06 | End: 2022-05-31

## 2022-01-01 DIAGNOSIS — Z79.4 TYPE 2 DIABETES MELLITUS WITHOUT COMPLICATION, WITH LONG-TERM CURRENT USE OF INSULIN (HCC): ICD-10-CM

## 2022-01-01 DIAGNOSIS — E11.9 TYPE 2 DIABETES MELLITUS WITHOUT COMPLICATION, WITH LONG-TERM CURRENT USE OF INSULIN (HCC): ICD-10-CM

## 2022-01-06 ENCOUNTER — TELEPHONE (OUTPATIENT)
Dept: ENDOCRINOLOGY | Age: 67
End: 2022-01-06

## 2022-01-06 NOTE — TELEPHONE ENCOUNTER
Received BG readings from pt via email (pt's preferred method of communication), and wrote back the following recommendations. Lets have him increase the Lantus to 55 units in the morning and 55 units at bedtime. Increase his Humalog from 35 units with each meal to 38 units with each meal.    At our visit on the 27th, we can see how his sugars do with this regimen and make more changes as needed.

## 2022-01-08 RX ORDER — CARVEDILOL 12.5 MG/1
TABLET ORAL
Qty: 180 TABLET | Refills: 3 | Status: SHIPPED | OUTPATIENT
Start: 2022-01-08 | End: 2022-02-16

## 2022-01-21 LAB
ALBUMIN SERPL-MCNC: 3.9 G/DL (ref 3.8–4.8)
ALBUMIN/CREAT UR: 38 MG/G CREAT (ref 0–29)
ALBUMIN/GLOB SERPL: 1.3 {RATIO} (ref 1.2–2.2)
ALP SERPL-CCNC: 92 IU/L (ref 44–121)
ALT SERPL-CCNC: 32 IU/L (ref 0–44)
AST SERPL-CCNC: 27 IU/L (ref 0–40)
BILIRUB SERPL-MCNC: 0.3 MG/DL (ref 0–1.2)
BUN SERPL-MCNC: 12 MG/DL (ref 8–27)
BUN/CREAT SERPL: 13 (ref 10–24)
CALCIUM SERPL-MCNC: 9.1 MG/DL (ref 8.6–10.2)
CHLORIDE SERPL-SCNC: 104 MMOL/L (ref 96–106)
CHOLEST SERPL-MCNC: 121 MG/DL (ref 100–199)
CO2 SERPL-SCNC: 23 MMOL/L (ref 20–29)
CREAT SERPL-MCNC: 0.91 MG/DL (ref 0.76–1.27)
CREAT UR-MCNC: 107 MG/DL
EST. AVERAGE GLUCOSE BLD GHB EST-MCNC: 197 MG/DL
GLOBULIN SER CALC-MCNC: 3.1 G/DL (ref 1.5–4.5)
GLUCOSE SERPL-MCNC: 173 MG/DL (ref 65–99)
HBA1C MFR BLD: 8.5 % (ref 4.8–5.6)
HDLC SERPL-MCNC: 31 MG/DL
IMP & REVIEW OF LAB RESULTS: NORMAL
LDLC SERPL CALC-MCNC: 57 MG/DL (ref 0–99)
MICROALBUMIN UR-MCNC: 40.8 UG/ML
POTASSIUM SERPL-SCNC: 4.1 MMOL/L (ref 3.5–5.2)
PROT SERPL-MCNC: 7 G/DL (ref 6–8.5)
SODIUM SERPL-SCNC: 141 MMOL/L (ref 134–144)
TRIGL SERPL-MCNC: 200 MG/DL (ref 0–149)
VLDLC SERPL CALC-MCNC: 33 MG/DL (ref 5–40)

## 2022-01-21 NOTE — PROGRESS NOTES
Good news HGBA1C down to 8.5  Lipid panel is stable  Metabolic panel good liver and kidney functions

## 2022-01-27 ENCOUNTER — OFFICE VISIT (OUTPATIENT)
Dept: ENDOCRINOLOGY | Age: 67
End: 2022-01-27
Payer: MEDICARE

## 2022-01-27 VITALS
WEIGHT: 263.4 LBS | BODY MASS INDEX: 37.71 KG/M2 | HEIGHT: 70 IN | HEART RATE: 67 BPM | DIASTOLIC BLOOD PRESSURE: 67 MMHG | SYSTOLIC BLOOD PRESSURE: 133 MMHG

## 2022-01-27 DIAGNOSIS — E78.2 MIXED HYPERLIPIDEMIA: ICD-10-CM

## 2022-01-27 DIAGNOSIS — E11.9 TYPE 2 DIABETES MELLITUS WITHOUT COMPLICATION, WITH LONG-TERM CURRENT USE OF INSULIN (HCC): Primary | ICD-10-CM

## 2022-01-27 DIAGNOSIS — Z79.4 TYPE 2 DIABETES MELLITUS WITHOUT COMPLICATION, WITH LONG-TERM CURRENT USE OF INSULIN (HCC): Primary | ICD-10-CM

## 2022-01-27 DIAGNOSIS — I10 ESSENTIAL HYPERTENSION: ICD-10-CM

## 2022-01-27 PROCEDURE — 2022F DILAT RTA XM EVC RTNOPTHY: CPT | Performed by: INTERNAL MEDICINE

## 2022-01-27 PROCEDURE — G8752 SYS BP LESS 140: HCPCS | Performed by: INTERNAL MEDICINE

## 2022-01-27 PROCEDURE — G8536 NO DOC ELDER MAL SCRN: HCPCS | Performed by: INTERNAL MEDICINE

## 2022-01-27 PROCEDURE — 99214 OFFICE O/P EST MOD 30 MIN: CPT | Performed by: INTERNAL MEDICINE

## 2022-01-27 PROCEDURE — 1101F PT FALLS ASSESS-DOCD LE1/YR: CPT | Performed by: INTERNAL MEDICINE

## 2022-01-27 PROCEDURE — G8417 CALC BMI ABV UP PARAM F/U: HCPCS | Performed by: INTERNAL MEDICINE

## 2022-01-27 PROCEDURE — G8754 DIAS BP LESS 90: HCPCS | Performed by: INTERNAL MEDICINE

## 2022-01-27 PROCEDURE — 3017F COLORECTAL CA SCREEN DOC REV: CPT | Performed by: INTERNAL MEDICINE

## 2022-01-27 PROCEDURE — 3052F HG A1C>EQUAL 8.0%<EQUAL 9.0%: CPT | Performed by: INTERNAL MEDICINE

## 2022-01-27 PROCEDURE — G8427 DOCREV CUR MEDS BY ELIG CLIN: HCPCS | Performed by: INTERNAL MEDICINE

## 2022-01-27 PROCEDURE — G8432 DEP SCR NOT DOC, RNG: HCPCS | Performed by: INTERNAL MEDICINE

## 2022-01-27 NOTE — LETTER
1/27/2022    Patient: Karina Hook   YOB: 1955   Date of Visit: 1/27/2022     Case Nathan NP  [address] [address]  [address] [address]  [address] [place]    Dear Case Nathan NP,      Thank you for referring Mr. Dottie Adair to [facility] for evaluation. My notes for this consultation are attached. If you have questions, please do not hesitate to call me. I look forward to following your patient along with you.       Sincerely,    Constance Alex MD

## 2022-01-27 NOTE — PROGRESS NOTES
Chief Complaint   Patient presents with    Diabetes     pcp and pharmacy verified     History of Present Illness: Hilary Zayas is a 74 y.o. male here for follow up of diabetes. He was diagnosed with diabetes 1993 . His A1C last week was 8.5%    Mr. Roann Gilford has been sending me his BG readings every month and we have been adjusting his insulin regimen. In January 2022 I increased his regimen to  Lantus to 55 units in the morning and 55 units at bedtime and Humalog from 35 units with each meal to 38 units with each meal.    Pt is testing his BGs 3 times per day. With the above changes his FBGs have been in the 160-230s range with pre diner in the 170-300 range. \"I have been going to doctors for the last 3 weeks. I am getting fitted for a new prosthesis for my right leg. I have been having a lot of pain. \"    Pt denies any recent illnesses, injuries or hospitalizations. Pt has received both COVID vaccinations (Darryl [place]). Pt is taking Lantus 55 units in the morning and 55 units HS and Humalog 38 units with breakfast and dinner plus 2:50 correction for BG >150. I am getting hungry in the evening and I will eat more. He denies missing or skipping any dose of his insulin. He has not had any issues of hypoglycemia. A typical day is as follows:  Pt wakes around 630-7AM. He will test his sugar when he gets up and take his Lantus and Humalog at that time. - He has breakfast around 7-730AM, this AM he had yogurt, toast and water. - He will have an apple or a cookie \"or something sweet\" in the mid-morning.  - He will have lunch around 2PM, yesterday he had a fried egg sandwich, no side items and water. - He will will have an afternoon snack of \"something\". - He has dinner around 5PM, last night he had oatmeal with honey and water. - He tries to not snack after dinner. \"If I do then it will cause my sugars to run higher. \"    Exercise consists of hauling fire wood.     Pt is s/p right KATEY in March 2018. \"I had a diabetic infection that went south\". He follows with Dr. Jaden Lepe stump doctor\" next week. No hx of MI or CVA. Pt has hx of neuropathy \"but it is not bad\" and he is not taking any agents for this. Pt denies hx of nephropathy. Last eye exam was 2019, no hx of retinopathy. Pt notes he has been through DM education in the past.    Current Outpatient Medications   Medication Sig    dulaglutide (TRULICITY) 3.02 RY/0.6 mL sub-q pen 0.5 mL by SubCUTAneous route every seven (7) days.  carvediloL (COREG) 12.5 mg tablet TAKE 1 TABLET BY MOUTH TWICE DAILY    amLODIPine (NORVASC) 5 mg tablet TAKE 1 TABLET BY MOUTH DAILY FOR HIGH BLOOD PRESSURE    insulin lispro (HUMALOG) 100 unit/mL kwikpen 35 units with breakfast, 35 units with lunch and 35 units with dinner (Insurance prefers branded Humalog) (Patient taking differently: 38 units twice a day plus sliding scale)    atorvastatin (LIPITOR) 20 mg tablet Take 1 Tablet by mouth daily. (replaces pravastatin per formulary preference)    lancets misc Test as directed    Insulin Needles, Disposable, (BD Ultra-Fine Short Pen Needle) 31 gauge x 5/16\" ndle Use on insulin pen 5 times per day. DX: E11.40    glucose blood VI test strips (Accu-Chek Guide test strips) strip DX E 11.65 Check blood glucose bid and prn    insulin glargine (LANTUS,BASAGLAR) 100 unit/mL (3 mL) inpn 50 units in the morning and 50 units in the evening  Dx E 11.65 (Patient taking differently: 55 units in the morning and 55 units in the evening)    insulin glargine (Lantus Solostar U-100 Insulin) 100 unit/mL (3 mL) inpn Takes 45 units in the morning and 35 units at bedtime. Indications: type 2 diabetes mellitus    Blood-Glucose Meter (Accu-Chek Cecille Plus Meter) misc 1 Units by SubCUTAneous route Before breakfast, lunch, dinner and at bedtime.     Blood Sugar Diagnostic, Drum (Accu-Chek Compact Plus Test) strp 1 Each by Does Not Apply route four (4) times daily as needed for Other (bllod glucose). 3 x 17   Indications: IDDM    BD Karine 2nd Gen Pen Needle 32 gauge x 5/32\" ndle Use twice daily as directed  DX E 11.65    lancets (Accu-Chek Multiclix Lancet) misc by SubCUTAneous route Before breakfast, lunch, dinner and at bedtime.  multivitamin (ONE A DAY) tablet Take 1 Tab by mouth daily. Not a current medication    Lancing Device (LANCING DEVICE WITH LANCETS) misc 1 Each by Does Not Apply route four (4) times daily.  aspirin 81 mg chewable tablet Take 81 mg by mouth daily. Not a current medication     No current facility-administered medications for this visit. No Known Allergies  Review of Systems:  - Eyes: no blurry vision or double vision  - Cardiovascular: no chest pain  - Respiratory: no shortness of breath  - Musculoskeletal: no myalgias  - Neurological: no numbness/tingling in extremities    Physical Examination:  Blood pressure 133/67, pulse 67, height 5' 9.5\" (1.765 m), weight 263 lb 6.4 oz (119.5 kg). - General: pleasant, no distress, good eye contact   - Neck: no carotid bruits  - Cardiovascular: regular, normal rate, nl s1 and s2, no m/r/g, 2+ DP pulses   - Respiratory: clear bilaterally  - Integumentary: no edema, no foot ulcers,   - Psychiatric: normal mood and affect    Diabetic foot exam:     Left Foot:   Visual Exam: callous - present and amputation- Toes 4-5 surgically absent   Pulse DP: 1+ (weak)   Filament test: absent sensation    Vibratory sensation: absent      Right Foot:   surgically absent. Pt has prosthetic.       Data Reviewed:   Component      Latest Ref Rng & Units 1/20/2022   Glucose      65 - 99 mg/dL 173 (H)   BUN      8 - 27 mg/dL 12   Creatinine      0.76 - 1.27 mg/dL 0.91   GFR est non-AA      >59 mL/min/1.73 86   GFR est AA      >59 mL/min/1.73 100   BUN/Creatinine ratio      10 - 24 13   Sodium      134 - 144 mmol/L 141   Potassium      3.5 - 5.2 mmol/L 4.1   Chloride      96 - 106 mmol/L 104   CO2      20 - 29 mmol/L 23   Calcium 8.6 - 10.2 mg/dL 9.1   Protein, total      6.0 - 8.5 g/dL 7.0   Albumin      3.8 - 4.8 g/dL 3.9   GLOBULIN, TOTAL      1.5 - 4.5 g/dL 3.1   A-G Ratio      1.2 - 2.2 1.3   Bilirubin, total      0.0 - 1.2 mg/dL 0.3   Alk. phosphatase      44 - 121 IU/L 92   AST      0 - 40 IU/L 27   ALT      0 - 44 IU/L 32   Cholesterol, total      100 - 199 mg/dL 121   Triglyceride      0 - 149 mg/dL 200 (H)   HDL Cholesterol      >39 mg/dL 31 (L)   VLDL, calculated      5 - 40 mg/dL 33   LDL, calculated      0 - 99 mg/dL 57   Creatinine, urine      Not Estab. mg/dL 107.0   Microalbumin, urine      Not Estab. ug/mL 40.8   Microalbumin/Creat. Ratio      0 - 29 mg/g creat 38 (H)   Hemoglobin A1c, (calculated)      4.8 - 5.6 % 8.5 (H)   Estimated average glucose      mg/dL 197       Assessment/Plan:   1) DM > His A1C last week was 8.5%. Pt to continue the Lantus 55 units BID and Humalog 38 units with each meal plus 2:50 for BG >150. Will start pt on Trulicity 4.81PC weekly. We discussed the mechanism of action of the GLP-1 agents and the risk vs benefits, including Nausea, pancreatitis and hypoglycemia. Pt to test his BGs three times per day and send me BG readings in 4 months. With his hx of neuropathy and amputations he would benefit from DM shoes and inserts. 2) HTN > His BP today was 133/67. Will not make any changes to his regimen at this time. 3) HLD > His lipid panel was at goal in January 2022. Pt to continue the Pravastatin 20mg daily. Pt voices understanding and agreement with the plan. RTC 4 months      Follow-up and Dispositions    · Return in about 3 months (around 4/27/2022).          Copy sent to:  Walter Rosas

## 2022-02-08 ENCOUNTER — OFFICE VISIT (OUTPATIENT)
Dept: FAMILY MEDICINE CLINIC | Age: 67
End: 2022-02-08
Payer: MEDICARE

## 2022-02-08 VITALS
DIASTOLIC BLOOD PRESSURE: 80 MMHG | BODY MASS INDEX: 37.51 KG/M2 | RESPIRATION RATE: 16 BRPM | HEIGHT: 70 IN | OXYGEN SATURATION: 97 % | TEMPERATURE: 97 F | WEIGHT: 262 LBS | HEART RATE: 74 BPM | SYSTOLIC BLOOD PRESSURE: 120 MMHG

## 2022-02-08 DIAGNOSIS — Z13.39 SCREENING FOR ALCOHOLISM: ICD-10-CM

## 2022-02-08 DIAGNOSIS — E11.40 TYPE 2 DIABETES MELLITUS WITH DIABETIC NEUROPATHY, WITH LONG-TERM CURRENT USE OF INSULIN (HCC): ICD-10-CM

## 2022-02-08 DIAGNOSIS — I10 ESSENTIAL HYPERTENSION: ICD-10-CM

## 2022-02-08 DIAGNOSIS — E78.5 HYPERLIPIDEMIA, UNSPECIFIED HYPERLIPIDEMIA TYPE: ICD-10-CM

## 2022-02-08 DIAGNOSIS — Z00.00 MEDICARE ANNUAL WELLNESS VISIT, SUBSEQUENT: Primary | ICD-10-CM

## 2022-02-08 DIAGNOSIS — D03.39 MELANOMA IN SITU OF OTHER PARTS OF FACE (HCC): ICD-10-CM

## 2022-02-08 DIAGNOSIS — R35.1 NOCTURIA: ICD-10-CM

## 2022-02-08 DIAGNOSIS — Z89.511 HX OF BKA, RIGHT (HCC): ICD-10-CM

## 2022-02-08 DIAGNOSIS — Z23 ENCOUNTER FOR IMMUNIZATION: ICD-10-CM

## 2022-02-08 DIAGNOSIS — Z79.4 TYPE 2 DIABETES MELLITUS WITH DIABETIC NEUROPATHY, WITH LONG-TERM CURRENT USE OF INSULIN (HCC): ICD-10-CM

## 2022-02-08 PROCEDURE — G0009 ADMIN PNEUMOCOCCAL VACCINE: HCPCS

## 2022-02-08 PROCEDURE — G0439 PPPS, SUBSEQ VISIT: HCPCS | Performed by: NURSE PRACTITIONER

## 2022-02-08 PROCEDURE — 90732 PPSV23 VACC 2 YRS+ SUBQ/IM: CPT

## 2022-02-08 NOTE — PROGRESS NOTES
Patient was administered* pneumonia 23 vaccine via IM in Right deltoid. Patient tolerated medication side effects noted  Medication information reviewed with patient, patient states understanding. Patient to resume routine medications at home. Patient given copy of AVS with medication information and instructions for home. AVS reviewed with patient and patient states understanding. This is the Subsequent Medicare Annual Wellness Exam, performed 12 months or more after the Initial AWV or the last Subsequent AWV    I have reviewed the patient's medical history in detail and updated the computerized patient record. Assessment/Plan   Education and counseling provided:  Are appropriate based on today's review and evaluation    1. Medicare annual wellness visit, subsequent  2. Screening for alcoholism       Depression Risk Factor Screening     3 most recent PHQ Screens 2/8/2022   Little interest or pleasure in doing things Not at all   Feeling down, depressed, irritable, or hopeless Not at all   Total Score PHQ 2 0   Trouble falling or staying asleep, or sleeping too much Not at all   Feeling tired or having little energy Not at all   Poor appetite, weight loss, or overeating Not at all   Feeling bad about yourself - or that you are a failure or have let yourself or your family down Not at all   Trouble concentrating on things such as school, work, reading, or watching TV Not at all   Moving or speaking so slowly that other people could have noticed; or the opposite being so fidgety that others notice Not at all   Thoughts of being better off dead, or hurting yourself in some way Not at all   PHQ 9 Score 0       Alcohol & Drug Abuse Risk Screen    Do you average more than 1 drink per night or more than 7 drinks a week: No    In the past three months have you have had more than 4 drinks containing alcohol on one occasion: No          Functional Ability and Level of Safety    Hearing: Hearing is good. Activities of Daily Living: The home contains: handrails and grab bars  Patient does total self care      Ambulation: with no difficulty     Fall Risk:  Fall Risk Assessment, last 12 mths 2/8/2022   Able to walk? Yes   Fall in past 12 months? 0   Do you feel unsteady?  0   Are you worried about falling 0      Abuse Screen:  Patient is not abused       Cognitive Screening    Has your family/caregiver stated any concerns about your memory: no     Cognitive Screening: Normal - MMSE (Mini Mental Status Exam)    Health Maintenance Due     Health Maintenance Due   Topic Date Due    COVID-19 Vaccine (1) Never done    DTaP/Tdap/Td series (1 - Tdap) Never done    Colorectal Cancer Screening Combo  Never done    Shingrix Vaccine Age 50> (1 of 2) Never done    Eye Exam Retinal or Dilated  06/01/2020    Pneumococcal 65+ years (2 of 2 - PPSV23) 12/11/2020    Flu Vaccine (1) 09/01/2021    Depression Screen  01/05/2022       Patient Care Team   Patient Care Team:  Joan Martinez NP as PCP - General (Nurse Practitioner)  Joan Martinez NP [facility]  Ethan Morris MD as Consulting Provider (Internal Medicine)  Judy Hernandez MD as Consulting Provider (Endocrinology)    History     Patient Active Problem List   Diagnosis Code    Essential hypertension I10    Other hyperlipidemia E78.49    Melanoma in situ of other parts of face ([address] D03.39     Past Medical History:   Diagnosis Date    Diabetes ([address]     High cholesterol     Hypertension     Nausea & vomiting     Osteomyelitis of right foot ([address] 1/17/2017      Past Surgical History:   Procedure Laterality Date    HX CATARACT REMOVAL Bilateral 2012    HX ORTHOPAEDIC Right     great toe amputation    HX ORTHOPAEDIC Left     2 small toes amputated    HX ORTHOPAEDIC Right 03/27/2019    [facility]     Current Outpatient Medications   Medication Sig Dispense Refill    dulaglutide (TRULICITY) 7.55 AW/4.5 mL sub-q pen 0.5 mL by SubCUTAneous route every seven (7) days. 12 Each 1    carvediloL (COREG) 12.5 mg tablet TAKE 1 TABLET BY MOUTH TWICE DAILY 180 Tablet 3    amLODIPine (NORVASC) 5 mg tablet TAKE 1 TABLET BY MOUTH DAILY FOR HIGH BLOOD PRESSURE 90 Tablet 1    insulin lispro (HUMALOG) 100 unit/mL kwikpen 35 units with breakfast, 35 units with lunch and 35 units with dinner (Insurance prefers branded Humalog) (Patient taking differently: 38 units twice a day plus sliding scale) 60 mL 3    atorvastatin (LIPITOR) 20 mg tablet Take 1 Tablet by mouth daily. (replaces pravastatin per formulary preference) 90 Tablet 3    lancets misc Test as directed 1 Each 11    Insulin Needles, Disposable, (BD Ultra-Fine Short Pen Needle) 31 gauge x 5/16\" ndle Use on insulin pen 5 times per day. DX: E11.40 500 Pen Needle 3    glucose blood VI test strips (Accu-Chek Guide test strips) strip DX E 11.65 Check blood glucose bid and prn 100 Strip 5    insulin glargine (LANTUS,BASAGLAR) 100 unit/mL (3 mL) inpn 50 units in the morning and 50 units in the evening  Dx E 11.65 (Patient taking differently: 55 units in the morning and 55 units in the evening) 5 Adjustable Dose Pre-filled Pen Syringe 3    insulin glargine (Lantus Solostar U-100 Insulin) 100 unit/mL (3 mL) inpn Takes 45 units in the morning and 35 units at bedtime. Indications: type 2 diabetes mellitus 90 mL 11    Blood-Glucose Meter (Accu-Chek Cecille Plus Meter) misc 1 Units by SubCUTAneous route Before breakfast, lunch, dinner and at bedtime. 1 Each 0    Blood Sugar Diagnostic, Drum (Accu-Chek Compact Plus Test) strp 1 Each by Does Not Apply route four (4) times daily as needed for Other (bllod glucose). 3 x 17   Indications: IDDM 100 Strip 11    BD Karine 2nd Gen Pen Needle 32 gauge x 5/32\" ndle Use twice daily as directed  DX E 11.65 100 Pen Needle 11    lancets (Accu-Chek Multiclix Lancet) misc by SubCUTAneous route Before breakfast, lunch, dinner and at bedtime.  200 Each 11    multivitamin (ONE A DAY) tablet Take 1 Tab by mouth daily. Not a current medication      Lancing Device (LANCING DEVICE WITH LANCETS) misc 1 Each by Does Not Apply route four (4) times daily. 1 Each 0    aspirin 81 mg chewable tablet Take 81 mg by mouth daily.  Not a current medication       No Known Allergies    Family History   Problem Relation Age of Onset    Heart Disease Mother     No Known Problems Father     Lung Disease Sister     No Known Problems Brother     No Known Problems Sister     No Known Problems Sister      Social History     Tobacco Use    Smoking status: Never Smoker    Smokeless tobacco: Never Used   Substance Use Topics    Alcohol use: No         Azalia Ashraf NP-C

## 2022-02-08 NOTE — PATIENT INSTRUCTIONS
Medicare Wellness Visit, Male    The best way to live healthy is to have a lifestyle where you eat a well-balanced diet, exercise regularly, limit alcohol use, and quit all forms of tobacco/nicotine, if applicable. Regular preventive services are another way to keep healthy. Preventive services (vaccines, screening tests, monitoring & exams) can help personalize your care plan, which helps you manage your own care. Screening tests can find health problems at the earliest stages, when they are easiest to treat. [facility] follows the current, evidence-based guidelines published by the [facility] ([facility]) when recommending preventive services for our patients. Because we follow these guidelines, sometimes recommendations change over time as research supports it. (For example, a prostate screening blood test is no longer routinely recommended for men with no symptoms). Of course, you and your doctor may decide to screen more often for some diseases, based on your risk and co-morbidities (chronic disease you are already diagnosed with). Preventive services for you include:  - Medicare offers their members a free annual wellness visit, which is time for you and your primary care provider to discuss and plan for your preventive service needs. Take advantage of this benefit every year!  -All adults over age 72 should receive the recommended pneumonia vaccines. Current USPSTF guidelines recommend a series of two vaccines for the best pneumonia protection.   -All adults should have a flu vaccine yearly and tetanus vaccine every 10 years.  -All adults age 48 and older should receive the shingles vaccines (series of two vaccines).        -All adults age 38-68 who are overweight should have a diabetes screening test once every three years.   -Other screening tests & preventive services for persons with diabetes include: an eye exam to screen for diabetic retinopathy, a kidney function test, a foot exam, and stricter control over your cholesterol.   -Cardiovascular screening for adults with routine risk involves an electrocardiogram (ECG) at intervals determined by the provider.   -Colorectal cancer screening should be done for adults age 54-65 with no increased risk factors for colorectal cancer. There are a number of acceptable methods of screening for this type of cancer. Each test has its own benefits and drawbacks. Discuss with your provider what is most appropriate for you during your annual wellness visit. The different tests include: colonoscopy (considered the best screening method), a fecal occult blood test, a fecal DNA test, and sigmoidoscopy.  -All adults born between [facility] should be screened once for Hepatitis C.  -An Abdominal Aortic Aneurysm (AAA) Screening is recommended for men age 73-68 who has ever smoked in their lifetime.      Here is a list of your current Health Maintenance items (your personalized list of preventive services) with a due date:  Health Maintenance Due   Topic Date Due    COVID-19 Vaccine (1) Never done    DTaP/Tdap/Td  (1 - Tdap) Never done    Colorectal Screening  Never done    Shingles Vaccine (1 of 2) Never done    Eye Exam  06/01/2020    Pneumococcal Vaccine (2 of 2 - PPSV23) 12/11/2020    Yearly Flu Vaccine (1) 09/01/2021    Depresssion Screening  01/05/2022

## 2022-02-09 PROBLEM — D03.39: Status: ACTIVE | Noted: 2022-02-09

## 2022-02-09 LAB
BASOPHILS # BLD: 0 K/UL (ref 0–0.1)
BASOPHILS NFR BLD: 1 % (ref 0–1)
DIFFERENTIAL METHOD BLD: NORMAL
EOSINOPHIL # BLD: 0.1 K/UL (ref 0–0.4)
EOSINOPHIL NFR BLD: 1 % (ref 0–7)
ERYTHROCYTE [DISTWIDTH] IN BLOOD BY AUTOMATED COUNT: 13.9 % (ref 11.5–14.5)
HCT VFR BLD AUTO: 45.9 % (ref 36.6–50.3)
HGB BLD-MCNC: 14.9 G/DL (ref 12.1–17)
IMM GRANULOCYTES # BLD AUTO: 0 K/UL (ref 0–0.04)
IMM GRANULOCYTES NFR BLD AUTO: 0 % (ref 0–0.5)
LYMPHOCYTES # BLD: 2.2 K/UL (ref 0.8–3.5)
LYMPHOCYTES NFR BLD: 25 % (ref 12–49)
MCH RBC QN AUTO: 30.7 PG (ref 26–34)
MCHC RBC AUTO-ENTMCNC: 32.5 G/DL (ref 30–36.5)
MCV RBC AUTO: 94.4 FL (ref 80–99)
MONOCYTES # BLD: 0.9 K/UL (ref 0–1)
MONOCYTES NFR BLD: 10 % (ref 5–13)
NEUTS SEG # BLD: 5.4 K/UL (ref 1.8–8)
NEUTS SEG NFR BLD: 63 % (ref 32–75)
NRBC # BLD: 0 K/UL (ref 0–0.01)
NRBC BLD-RTO: 0 PER 100 WBC
PLATELET # BLD AUTO: 248 K/UL (ref 150–400)
PMV BLD AUTO: 11.6 FL (ref 8.9–12.9)
PSA SERPL-MCNC: 0.6 NG/ML (ref 0.01–4)
RBC # BLD AUTO: 4.86 M/UL (ref 4.1–5.7)
WBC # BLD AUTO: 8.7 K/UL (ref 4.1–11.1)

## 2022-02-09 NOTE — ACP (ADVANCE CARE PLANNING)
Discussed importance of advanced medical directives with patient. Patient is capable of making decisions.  Aubrie Mcgarry NP-C

## 2022-02-09 NOTE — PROGRESS NOTES
Subjective:     Karina Hook is a 74 y.o. male who presents today with the following:  Chief Complaint   Patient presents with    Annual Wellness Visit    Diabetes     BKA prothesis is tight. Patient Active Problem List   Diagnosis Code    Essential hypertension I10    Other hyperlipidemia E78.49    Melanoma in situ of other parts of face ([facility] [address] D03.39         COMPLIANT WITH MEDICATION:   HTN; Denies chest pain, dyspnea, palpitations, headache and blurred vision. Blood pressure normotensive. BKA; right  He endorses that his prosthetic is not fitting well . Limb is getting larger. Mr. Niko Guajardo endorses that he continues to use hi prosthesis on a regular basis and relies on it for daily activities and ambulation. He also endorses that he is experiences increase in residual limb volume causing his current socket to no longer fit properly. A replacement socket will address these changes to improve Mr. Caitlyn Briones comfort ,safety, and control of the prosthesis. depression screening addressed not at risk    abuse screening addressed denies    learning assessment addressed reviewed nurses notes    fall risk addressed  at risk uses a prosthesis   HM: addressed FIT test provided with instructions.      ROS:  Gen: denies fever, chills, fatigue, weight loss, weight gain  HEENT:denies blurry vision, nasal congestion, sore throat  Resp: denies dypsnea, cough, wheezing  CV: denies chest pain radiating to the jaws or arms, palpitations, lower extremity edema  Abd: denies nausea, vomiting, diarrhea, constipation  Neuro: denies numbness/tingling  Endo: denies polyuria, polydipsia, heat/cold intolerance  Heme: no lymphadenopathy    No Known Allergies      Current Outpatient Medications:     carvediloL (COREG) 12.5 mg tablet, TAKE 1 TABLET BY MOUTH TWICE DAILY, Disp: 180 Tablet, Rfl: 3    amLODIPine (NORVASC) 5 mg tablet, TAKE 1 TABLET BY MOUTH DAILY FOR HIGH BLOOD PRESSURE, Disp: 90 Tablet, Rfl: 1    insulin lispro (HUMALOG) 100 unit/mL kwikpen, 35 units with breakfast, 35 units with lunch and 35 units with dinner (Insurance prefers branded Humalog) (Patient taking differently: 38 units twice a day plus sliding scale), Disp: 60 mL, Rfl: 3    atorvastatin (LIPITOR) 20 mg tablet, Take 1 Tablet by mouth daily. (replaces pravastatin per formulary preference), Disp: 90 Tablet, Rfl: 3    lancets misc, Test as directed, Disp: 1 Each, Rfl: 11    Insulin Needles, Disposable, (BD Ultra-Fine Short Pen Needle) 31 gauge x 5/16\" ndle, Use on insulin pen 5 times per day. DX: E11.40, Disp: 500 Pen Needle, Rfl: 3    glucose blood VI test strips (Accu-Chek Guide test strips) strip, DX E 11.65 Check blood glucose bid and prn, Disp: 100 Strip, Rfl: 5    insulin glargine (LANTUS,BASAGLAR) 100 unit/mL (3 mL) inpn, 50 units in the morning and 50 units in the evening Dx E 11.65 (Patient taking differently: 55 units in the morning and 55 units in the evening), Disp: 5 Adjustable Dose Pre-filled Pen Syringe, Rfl: 3    Blood-Glucose Meter (Accu-Chek Cecille Plus Meter) misc, 1 Units by SubCUTAneous route Before breakfast, lunch, dinner and at bedtime. , Disp: 1 Each, Rfl: 0    Blood Sugar Diagnostic, Drum (Accu-Chek Compact Plus Test) strp, 1 Each by Does Not Apply route four (4) times daily as needed for Other (bllod glucose). 3 x 17   Indications: IDDM, Disp: 100 Strip, Rfl: 11    BD Karine 2nd Gen Pen Needle 32 gauge x 5/32\" ndle, Use twice daily as directed  DX E 11.65, Disp: 100 Pen Needle, Rfl: 11    lancets (Accu-Chek Multiclix Lancet) misc, by SubCUTAneous route Before breakfast, lunch, dinner and at bedtime. , Disp: 200 Each, Rfl: 11    multivitamin (ONE A DAY) tablet, Take 1 Tab by mouth daily. Not a current medication, Disp: , Rfl:     Lancing Device (LANCING DEVICE WITH LANCETS) misc, 1 Each by Does Not Apply route four (4) times daily. , Disp: 1 Each, Rfl: 0    aspirin 81 mg chewable tablet, Take 81 mg by mouth daily.  Not a current medication, Disp: , Rfl:     dulaglutide (TRULICITY) 8.69 MA/9.2 mL sub-q pen, 0.5 mL by SubCUTAneous route every seven (7) days. (Patient not taking: Reported on 2/8/2022), Disp: 12 Each, Rfl: 1    insulin glargine (Lantus Solostar U-100 Insulin) 100 unit/mL (3 mL) inpn, Takes 45 units in the morning and 35 units at bedtime. Indications: type 2 diabetes mellitus, Disp: 90 mL, Rfl: 11    Past Medical History:   Diagnosis Date    Diabetes ([facility] [address]     High cholesterol     Hypertension     Nausea & vomiting     Osteomyelitis of right foot ([facility] [address] 1/17/2017       Past Surgical History:   Procedure Laterality Date    HX CATARACT REMOVAL Bilateral 2012    HX ORTHOPAEDIC Right     great toe amputation    HX ORTHOPAEDIC Left     2 small toes amputated    HX ORTHOPAEDIC Right 03/27/2019    BKA       Social History     Tobacco Use   Smoking Status Never Smoker   Smokeless Tobacco Never Used       Social History     Socioeconomic History    Marital status:    Tobacco Use    Smoking status: Never Smoker    Smokeless tobacco: Never Used   Vaping Use    Vaping Use: Never used   Substance and Sexual Activity    Alcohol use: No    Drug use: No       Family History   Problem Relation Age of Onset    Heart Disease Mother     No Known Problems Father     Lung Disease Sister     No Known Problems Brother     No Known Problems Sister     No Known Problems Sister          Objective:     Visit Vitals  /80 (BP 1 Location: Right upper arm, BP Patient Position: Sitting, BP Cuff Size: Large adult)   Pulse 74   Temp 97 °F (36.1 °C) (Temporal)   Resp 16   Ht 5' 9.5\" (1.765 m)   Wt 262 lb (118.8 kg)   SpO2 97%   BMI 38.14 kg/m²     Body mass index is 38.14 kg/m². General: Alert and oriented. No acute distress. Well nourished  HEENT :  Ears:TMs are normal. Canals are clear. Eyes: pupils equal, round, react to light and accommodation. Extra ocular movements intact. Nose: patent.  Mouth and throat is clear.  Neck:supple full range of motion no thyromegaly. Trachea midline, No carotid bruits. No significant lymphadenopathy  Lungs[de-identified] clear to auscultation without wheezes, rales, or rhonchi. Heart :RRR, S1 & S2 are normal intensity. No murmur; no gallop  Abdomen: bowel sounds active. No tenderness, guarding, rebound, masses, hepatic or spleen enlargement  Back: no CVA tenderness. Extremities: without clubbing, cyanosis, or edema  Pulses: radial and femoral pulses are normal  Neuro: HMF intact. Cranial nerves II through XII grossly normal.  Motor: is 5 over 5 and symmetrical.   Deep tendon reflexes: +2 equal  Psych:appropriate behavior, mood, affect and judgement. Results for orders placed or performed in visit on 02/08/22   PSA, DIAGNOSTIC (PROSTATE SPECIFIC AG)   Result Value Ref Range    Prostate Specific Ag 0.6 0.01 - 4.0 ng/mL   CBC WITH AUTOMATED DIFF   Result Value Ref Range    WBC 8.7 4.1 - 11.1 K/uL    RBC 4.86 4.10 - 5.70 M/uL    HGB 14.9 12.1 - 17.0 g/dL    HCT 45.9 36.6 - 50.3 %    MCV 94.4 80.0 - 99.0 FL    MCH 30.7 26.0 - 34.0 PG    MCHC 32.5 30.0 - 36.5 g/dL    RDW 13.9 11.5 - 14.5 %    PLATELET 944 930 - 475 K/uL    MPV 11.6 8.9 - 12.9 FL    NRBC 0.0 0  WBC    ABSOLUTE NRBC 0.00 0.00 - 0.01 K/uL    NEUTROPHILS 63 32 - 75 %    LYMPHOCYTES 25 12 - 49 %    MONOCYTES 10 5 - 13 %    EOSINOPHILS 1 0 - 7 %    BASOPHILS 1 0 - 1 %    IMMATURE GRANULOCYTES 0 0.0 - 0.5 %    ABS. NEUTROPHILS 5.4 1.8 - 8.0 K/UL    ABS. LYMPHOCYTES 2.2 0.8 - 3.5 K/UL    ABS. MONOCYTES 0.9 0.0 - 1.0 K/UL    ABS. EOSINOPHILS 0.1 0.0 - 0.4 K/UL    ABS. BASOPHILS 0.0 0.0 - 0.1 K/UL    ABS. IMM.  GRANS. 0.0 0.00 - 0.04 K/UL    DF AUTOMATED         Results for orders placed or performed in visit on 02/08/22   PSA, DIAGNOSTIC (PROSTATE SPECIFIC AG)   Result Value Ref Range    Prostate Specific Ag 0.6 0.01 - 4.0 ng/mL   CBC WITH AUTOMATED DIFF   Result Value Ref Range    WBC 8.7 4.1 - 11.1 K/uL    RBC 4.86 4.10 - 5.70 M/uL    HGB 14.9 12.1 - 17.0 g/dL    HCT 45.9 36.6 - 50.3 %    MCV 94.4 80.0 - 99.0 FL    MCH 30.7 26.0 - 34.0 PG    MCHC 32.5 30.0 - 36.5 g/dL    RDW 13.9 11.5 - 14.5 %    PLATELET 789 678 - 687 K/uL    MPV 11.6 8.9 - 12.9 FL    NRBC 0.0 0  WBC    ABSOLUTE NRBC 0.00 0.00 - 0.01 K/uL    NEUTROPHILS 63 32 - 75 %    LYMPHOCYTES 25 12 - 49 %    MONOCYTES 10 5 - 13 %    EOSINOPHILS 1 0 - 7 %    BASOPHILS 1 0 - 1 %    IMMATURE GRANULOCYTES 0 0.0 - 0.5 %    ABS. NEUTROPHILS 5.4 1.8 - 8.0 K/UL    ABS. LYMPHOCYTES 2.2 0.8 - 3.5 K/UL    ABS. MONOCYTES 0.9 0.0 - 1.0 K/UL    ABS. EOSINOPHILS 0.1 0.0 - 0.4 K/UL    ABS. BASOPHILS 0.0 0.0 - 0.1 K/UL    ABS. IMM. GRANS. 0.0 0.00 - 0.04 K/UL    DF AUTOMATED         Assessment/ Plan:     1. Medicare annual wellness visit, subsequent    - [place] ANNUAL ALCOHOL SCREEN 15 MIN  - COLLECTION VENOUS BLOOD,VENIPUNCTURE; Future  - COLLECTION VENOUS BLOOD,VENIPUNCTURE    2. Screening for alcoholism    - WI ANNUAL ALCOHOL SCREEN 15 MIN    3. Type 2 diabetes mellitus with diabetic neuropathy, with long-term current use of insulin ([facility]  Followed by specialist  No change in medical management   - CBC WITH AUTOMATED DIFF; Future  - COLLECTION VENOUS BLOOD,VENIPUNCTURE; Future  - COLLECTION VENOUS BLOOD,VENIPUNCTURE  - CBC WITH AUTOMATED DIFF    4. Hx of BKA right ([facility] [address]  Working wit [employer] for prosthetic device     5. Essential hypertension  BP in goal continue HTN meds as noted above. No change in medical management   - CBC WITH AUTOMATED DIFF; Future  - COLLECTION VENOUS BLOOD,VENIPUNCTURE; Future  - COLLECTION VENOUS BLOOD,VENIPUNCTURE  - CBC WITH AUTOMATED DIFF    6. Hyperlipidemia, unspecified hyperlipidemia type    Continue on atorvastatin as noted above   7. Nocturia    - PSA, DIAGNOSTIC (PROSTATE SPECIFIC AG); Future  - COLLECTION VENOUS BLOOD,VENIPUNCTURE; Future  - COLLECTION VENOUS BLOOD,VENIPUNCTURE  - PSA, DIAGNOSTIC (PROSTATE SPECIFIC AG)    8.  Encounter for immunization    - PNEUMOCOCCAL POLYSACCHARIDE VACCINE, 23-VALENT, ADULT OR IMMUNOSUPPRESSED PT DOSE,  - ADMIN PNEUMOCOCCAL VACCINE    9. Melanoma in situ of other parts of face ([facility] [address]  Followed by specialist .       Orders Placed This Encounter    COLLECTION VENOUS BLOOD,VENIPUNCTURE     Standing Status:   Future     Number of Occurrences:   1     Standing Expiration Date:   3/8/2022    PNEUMOCOCCAL POLYSACCHARIDE VACCINE, 23-VALENT, ADULT OR IMMUNOSUPPRESSED PT DOSE,    CBC WITH AUTOMATED DIFF     Standing Status:   Future     Number of Occurrences:   1     Standing Expiration Date:   3/8/2022    PROSTATE SPECIFIC AG     Standing Status:   Future     Number of Occurrences:   1     Standing Expiration Date:   3/8/2022    ANNUAL ALCOHOL SCREEN 8-15 MIN    ADMIN PNEUMOCOCCAL VACCINE         Verbal and written instructions (see AVS) provided. Patient expresses understanding of diagnosis and treatment plan.     Health Maintenance Due   Topic Date Due    DTaP/Tdap/Td series (1 - Tdap) Never done    Colorectal Cancer Screening Combo  Never done    Shingrix Vaccine Age 50> (1 of 2) Never done    Eye Exam Retinal or Dilated  06/01/2020               Maty Lewis, FNP-C

## 2022-02-10 NOTE — PROGRESS NOTES
Tried calling pt, his voicemail messages says to not leave a message. Will go over results if pt calls back.  KT

## 2022-02-16 RX ORDER — CARVEDILOL 12.5 MG/1
TABLET ORAL
Qty: 180 TABLET | Refills: 3 | Status: SHIPPED | OUTPATIENT
Start: 2022-02-16

## 2022-02-28 RX ORDER — BLOOD SUGAR DIAGNOSTIC
STRIP MISCELLANEOUS
Qty: 200 STRIP | Refills: 5 | Status: SHIPPED | OUTPATIENT
Start: 2022-02-28 | End: 2022-08-04 | Stop reason: SDUPTHER

## 2022-03-08 ENCOUNTER — TELEPHONE (OUTPATIENT)
Dept: FAMILY MEDICINE CLINIC | Age: 67
End: 2022-03-08

## 2022-03-08 RX ORDER — PEN NEEDLE, DIABETIC 32GX 5/32"
NEEDLE, DISPOSABLE MISCELLANEOUS
Qty: 200 PEN NEEDLE | Refills: 11 | Status: SHIPPED | OUTPATIENT
Start: 2022-03-08 | End: 2022-03-11

## 2022-03-08 NOTE — TELEPHONE ENCOUNTER
Iban shetty called and said pt called them to make apt for low back pain but he doesn't have a referral. They want to know if you will do one ?

## 2022-03-08 NOTE — TELEPHONE ENCOUNTER
Richard Jaimes at [facility] abound aware and will make pt aware to call us to robert alva if he wishes to preceed

## 2022-03-09 RX ORDER — AMLODIPINE BESYLATE 5 MG/1
TABLET ORAL
Qty: 90 TABLET | Refills: 3 | Status: SHIPPED | OUTPATIENT
Start: 2022-03-09

## 2022-03-18 PROBLEM — D03.39: Status: ACTIVE | Noted: 2022-02-09

## 2022-03-18 PROBLEM — E78.49 OTHER HYPERLIPIDEMIA: Status: ACTIVE | Noted: 2018-10-19

## 2022-03-19 PROBLEM — I10 ESSENTIAL HYPERTENSION: Status: ACTIVE | Noted: 2018-10-19

## 2022-05-02 ENCOUNTER — TELEPHONE (OUTPATIENT)
Dept: ENDOCRINOLOGY | Age: 67
End: 2022-05-02

## 2022-05-02 NOTE — TELEPHONE ENCOUNTER
Received BG logs from pt via E-mail (pt's preferred method of communication). His BS are better in the morning, but still high in the evening. I sent him the following response. Your blood sugars are looking better, how are you doing on the Trulicity 6.50MR every Saturday? I was thinking we can increase the dose on the Trulicity to 7.2PT every week, if you are doing ok on the 0.75mg per week.

## 2022-06-14 ENCOUNTER — OFFICE VISIT (OUTPATIENT)
Dept: ENDOCRINOLOGY | Age: 67
End: 2022-06-14
Payer: MEDICARE

## 2022-06-14 VITALS
HEART RATE: 71 BPM | WEIGHT: 259 LBS | SYSTOLIC BLOOD PRESSURE: 133 MMHG | BODY MASS INDEX: 37.08 KG/M2 | HEIGHT: 70 IN | DIASTOLIC BLOOD PRESSURE: 63 MMHG

## 2022-06-14 DIAGNOSIS — E78.2 MIXED HYPERLIPIDEMIA: ICD-10-CM

## 2022-06-14 DIAGNOSIS — E11.42 TYPE 2 DIABETES MELLITUS WITH DIABETIC POLYNEUROPATHY, WITH LONG-TERM CURRENT USE OF INSULIN (HCC): Primary | ICD-10-CM

## 2022-06-14 DIAGNOSIS — Z79.4 TYPE 2 DIABETES MELLITUS WITH DIABETIC POLYNEUROPATHY, WITH LONG-TERM CURRENT USE OF INSULIN (HCC): Primary | ICD-10-CM

## 2022-06-14 DIAGNOSIS — I10 ESSENTIAL HYPERTENSION: ICD-10-CM

## 2022-06-14 LAB — HBA1C MFR BLD HPLC: 7.7 %

## 2022-06-14 PROCEDURE — G8536 NO DOC ELDER MAL SCRN: HCPCS | Performed by: INTERNAL MEDICINE

## 2022-06-14 PROCEDURE — 99214 OFFICE O/P EST MOD 30 MIN: CPT | Performed by: INTERNAL MEDICINE

## 2022-06-14 PROCEDURE — 2022F DILAT RTA XM EVC RTNOPTHY: CPT | Performed by: INTERNAL MEDICINE

## 2022-06-14 PROCEDURE — 1123F ACP DISCUSS/DSCN MKR DOCD: CPT | Performed by: INTERNAL MEDICINE

## 2022-06-14 PROCEDURE — 1101F PT FALLS ASSESS-DOCD LE1/YR: CPT | Performed by: INTERNAL MEDICINE

## 2022-06-14 PROCEDURE — 3017F COLORECTAL CA SCREEN DOC REV: CPT | Performed by: INTERNAL MEDICINE

## 2022-06-14 PROCEDURE — G8752 SYS BP LESS 140: HCPCS | Performed by: INTERNAL MEDICINE

## 2022-06-14 PROCEDURE — G8417 CALC BMI ABV UP PARAM F/U: HCPCS | Performed by: INTERNAL MEDICINE

## 2022-06-14 PROCEDURE — 83036 HEMOGLOBIN GLYCOSYLATED A1C: CPT | Performed by: INTERNAL MEDICINE

## 2022-06-14 PROCEDURE — G8432 DEP SCR NOT DOC, RNG: HCPCS | Performed by: INTERNAL MEDICINE

## 2022-06-14 PROCEDURE — 3051F HG A1C>EQUAL 7.0%<8.0%: CPT | Performed by: INTERNAL MEDICINE

## 2022-06-14 PROCEDURE — G8427 DOCREV CUR MEDS BY ELIG CLIN: HCPCS | Performed by: INTERNAL MEDICINE

## 2022-06-14 PROCEDURE — G8754 DIAS BP LESS 90: HCPCS | Performed by: INTERNAL MEDICINE

## 2022-06-14 RX ORDER — DULAGLUTIDE 3 MG/.5ML
3 INJECTION, SOLUTION SUBCUTANEOUS
Qty: 12 EACH | Refills: 3 | Status: SHIPPED | OUTPATIENT
Start: 2022-06-14

## 2022-06-14 NOTE — LETTER
6/14/2022    Patient: Mahendra Del Valle   YOB: 1955   Date of Visit: 6/14/2022     Yakov Marino NP  [address] [address]  [address] [address]  [address] [place]    Dear Yakov Marino NP,      Thank you for referring Mr. Jenean Halsted to [facility] for evaluation. My notes for this consultation are attached. If you have questions, please do not hesitate to call me. I look forward to following your patient along with you.       Sincerely,    Scotty Segura MD

## 2022-08-02 ENCOUNTER — TELEPHONE (OUTPATIENT)
Dept: ENDOCRINOLOGY | Age: 67
End: 2022-08-02

## 2022-08-02 NOTE — TELEPHONE ENCOUNTER
Received BG logs from pt via E-mail (pt's preferred method of communication). I sent him the following response. Continue the Trulicity 3.0, but increase your Lantus to 60 units in the morning and 60 units at bedtime.  Continue the Humalog 38 units with each meal.

## 2022-08-04 RX ORDER — BLOOD SUGAR DIAGNOSTIC
STRIP MISCELLANEOUS
Qty: 200 STRIP | Refills: 5 | Status: SHIPPED | OUTPATIENT
Start: 2022-08-04

## 2022-08-09 RX ORDER — INSULIN GLARGINE 100 [IU]/ML
INJECTION, SOLUTION SUBCUTANEOUS
Qty: 90 ML | Refills: 3 | Status: SHIPPED | OUTPATIENT
Start: 2022-08-09

## 2022-09-01 ENCOUNTER — TELEPHONE (OUTPATIENT)
Dept: ENDOCRINOLOGY | Age: 67
End: 2022-09-01

## 2022-09-01 NOTE — TELEPHONE ENCOUNTER
Blood sugar logs emailed to me by pt and I responded via e-mail (pt's preferred communication method). Good Morning Mr. Mendez Churches over your blood sugars they do seem to be trending down and in a better range.      Based on these numbers, I recommend you continue the Trulicity 3.0, continue the Lantus 60 units in the morning and 60 units at bedtime, but increase your Humalog to 42 units with each meal.

## 2022-10-20 ENCOUNTER — OFFICE VISIT (OUTPATIENT)
Dept: ENDOCRINOLOGY | Age: 67
End: 2022-10-20
Payer: MEDICARE

## 2022-10-20 VITALS
HEIGHT: 70 IN | DIASTOLIC BLOOD PRESSURE: 70 MMHG | HEART RATE: 69 BPM | WEIGHT: 263.8 LBS | BODY MASS INDEX: 37.77 KG/M2 | SYSTOLIC BLOOD PRESSURE: 125 MMHG

## 2022-10-20 DIAGNOSIS — E11.42 TYPE 2 DIABETES MELLITUS WITH DIABETIC POLYNEUROPATHY, WITH LONG-TERM CURRENT USE OF INSULIN (HCC): Primary | ICD-10-CM

## 2022-10-20 DIAGNOSIS — E78.2 MIXED HYPERLIPIDEMIA: ICD-10-CM

## 2022-10-20 DIAGNOSIS — I10 ESSENTIAL HYPERTENSION: ICD-10-CM

## 2022-10-20 DIAGNOSIS — Z79.4 TYPE 2 DIABETES MELLITUS WITH DIABETIC POLYNEUROPATHY, WITH LONG-TERM CURRENT USE OF INSULIN (HCC): Primary | ICD-10-CM

## 2022-10-20 LAB — HBA1C MFR BLD HPLC: 7.7 %

## 2022-10-20 PROCEDURE — G8754 DIAS BP LESS 90: HCPCS | Performed by: INTERNAL MEDICINE

## 2022-10-20 PROCEDURE — G8432 DEP SCR NOT DOC, RNG: HCPCS | Performed by: INTERNAL MEDICINE

## 2022-10-20 PROCEDURE — 1101F PT FALLS ASSESS-DOCD LE1/YR: CPT | Performed by: INTERNAL MEDICINE

## 2022-10-20 PROCEDURE — 2022F DILAT RTA XM EVC RTNOPTHY: CPT | Performed by: INTERNAL MEDICINE

## 2022-10-20 PROCEDURE — G8417 CALC BMI ABV UP PARAM F/U: HCPCS | Performed by: INTERNAL MEDICINE

## 2022-10-20 PROCEDURE — 3017F COLORECTAL CA SCREEN DOC REV: CPT | Performed by: INTERNAL MEDICINE

## 2022-10-20 PROCEDURE — G8752 SYS BP LESS 140: HCPCS | Performed by: INTERNAL MEDICINE

## 2022-10-20 PROCEDURE — 99214 OFFICE O/P EST MOD 30 MIN: CPT | Performed by: INTERNAL MEDICINE

## 2022-10-20 PROCEDURE — 3051F HG A1C>EQUAL 7.0%<8.0%: CPT | Performed by: INTERNAL MEDICINE

## 2022-10-20 PROCEDURE — G8427 DOCREV CUR MEDS BY ELIG CLIN: HCPCS | Performed by: INTERNAL MEDICINE

## 2022-10-20 PROCEDURE — 1123F ACP DISCUSS/DSCN MKR DOCD: CPT | Performed by: INTERNAL MEDICINE

## 2022-10-20 PROCEDURE — G8536 NO DOC ELDER MAL SCRN: HCPCS | Performed by: INTERNAL MEDICINE

## 2022-10-20 PROCEDURE — 83036 HEMOGLOBIN GLYCOSYLATED A1C: CPT | Performed by: INTERNAL MEDICINE

## 2022-10-20 NOTE — PROGRESS NOTES
Chief Complaint   Patient presents with    Diabetes     Pcp and pharmacy verified     History of Present Illness: Ibis Schultz is a 69 y.o. male here for follow up of diabetes. He was diagnosed with diabetes 1993 . Pt denies any recent illnesses, injuries or hospitalizations. His A1C today was 7.7%. Mr. Graciela Thomas has been sending me his BG readings every month and we have been adjusting his insulin regimen. At our last interaction I instructed pt to continue the Trulicity to 2.8BB weekly, Lantus 62 units BID and increase his Humalog to 42 units with each meal.    Pt is testing his BGs twice per day. His FBGs are running in the 160-200s range with pre-dinner in the 90-250s range. He denies issues of hypoglycemia. Pt has received both COVID vaccinations (Ilanalydia Hollingsworthid) and a booster (Gabino Colón). A typical day is as follows:  Pt wakes around 8-830AM. He will test his sugar when he gets up and take his Lantus and Humalog at that time. - He has breakfast around 830-9AM, this AM he had waffles, blackberries and water. - He reports he is not snacking during the day. - He will have lunch 2 days per week, around 2PM, yesterday he did not have lunch. - He \"is trying to stay away from snacking in the afternoon. \".  - He has dinner around 530-6PM, last night he had baked beans, a turkey sandwich and water. - He reports he is not snacking in the evening. Exercise consists of hauling fire wood. Pt is s/p right BKA in March 2018. \"I had a diabetic infection that went south\". He saw Dr. Lupis Herrera \"my stump doctor\" in October 2022. \"I will be having some changes to my prostetic next week. No hx of MI or CVA. Pt has hx of neuropathy \"but it is not bad\" and he is not taking any agents for this. Pt denies hx of nephropathy. Last eye exam was in October 2022. \"they said I had some diabetic damage in my retina of both eyes and they are treating me with injections. \"     Pt notes he has been through DM education in the past.    Current Outpatient Medications   Medication Sig    atorvastatin (LIPITOR) 20 mg tablet TAKE 1 TABLET BY MOUTH  DAILY    insulin glargine (Lantus Solostar U-100 Insulin) 100 unit/mL (3 mL) inpn 55 units in the morning and 55 units in the evening    glucose blood VI test strips (Accu-Chek Guide test strips) strip DX E 11.65 Check blood glucose bid and prn    dulaglutide (Trulicity) 3 XN/1.5 mL pnij 3 mg by SubCUTAneous route every seven (7) days. insulin lispro (HumaLOG KwikPen Insulin) 100 unit/mL kwikpen INJECT SUBCUTANEOUSLY 38  UNITS WITH BREAKFAST 38  UNITS WITH LUNCH AND 38  UNITS WITH DINNER, plus correction. Max daily dose of 150 units (Patient taking differently: INJECT SUBCUTANEOUSLY 38  UNITS WITH BREAKFAST  AND 38  UNITS WITH DINNER, plus correction. Max daily dose of 150 units)    BD Karine 2nd Gen Pen Needle 32 gauge x 5/32\" ndle USE TWICE DAILY AS DIRECTED    amLODIPine (NORVASC) 5 mg tablet TAKE 1 TABLET BY MOUTH  DAILY FOR HIGH BLOOD  PRESSURE    carvediloL (COREG) 12.5 mg tablet TAKE 1 TABLET BY MOUTH  TWICE DAILY    lancets misc Test as directed    Blood-Glucose Meter (Accu-Chek Cecille Plus Meter) misc 1 Units by SubCUTAneous route Before breakfast, lunch, dinner and at bedtime. Blood Sugar Diagnostic, Drum (Accu-Chek Compact Plus Test) strp 1 Each by Does Not Apply route four (4) times daily as needed for Other (bllod glucose). 3 x 17   Indications: IDDM    lancets (Accu-Chek Multiclix Lancet) misc by SubCUTAneous route Before breakfast, lunch, dinner and at bedtime. multivitamin (ONE A DAY) tablet Take 1 Tab by mouth daily. Not a current medication    Lancing Device (LANCING DEVICE WITH LANCETS) misc 1 Each by Does Not Apply route four (4) times daily. aspirin 81 mg chewable tablet Take 81 mg by mouth daily. Not a current medication     No current facility-administered medications for this visit.      No Known Allergies  Review of Systems:  - Eyes: no blurry vision or double vision  - Cardiovascular: no chest pain  - Respiratory: no shortness of breath  - Musculoskeletal: no myalgias  - Neurological: no numbness/tingling in extremities    Physical Examination:  Blood pressure 125/70, pulse 69, height 5' 9.5\" (1.765 m), weight 263 lb 12.8 oz (119.7 kg). General: pleasant, no distress, good eye contact   Neck: no carotid bruits  Cardiovascular: regular, normal rate, nl s1 and s2, no m/r/g, 2+ DP pulses   Respiratory: clear bilaterally  Integumentary: no edema, no foot ulcers,   Psychiatric: normal mood and affect    Diabetic foot exam:     Left Foot:   Visual Exam: callous - present and amputation- Toes 4-5 surgically absent   Pulse DP: 1+ (weak)   Filament test: absent sensation    Vibratory sensation: absent      Right Foot:   surgically absent. Pt has prosthetic. Data Reviewed:   His A1C today was 7.7%    Assessment/Plan:   1) DM > His A1C today was 7.7%. He has not had issues of hypoglycemia, but he has had BGs in the 80-90s so I do not want to change his insulin any because I fear that could cause hypoglycemia. For now pt to continue the Trulicity to 2.7UV weekly, Lantus 62 units BID and Humalog 42 units with each meal plus 2:50 for BG >150. Pt to test his BGs three times per day and send me BG readings in 4 months. With his hx of neuropathy and amputations he would benefit from DM shoes and inserts. 2) HTN > His BP today was 125/70. Will not make any changes to his regimen at this time. 3) HLD > His lipid panel was at goal in January 2022. Pt to continue the Pravastatin 20mg daily. Pt voices understanding and agreement with the plan.     RTC 4 months    Copy sent to:  Domo Cook

## 2022-10-20 NOTE — LETTER
10/20/2022    Patient: Jessica Norman   YOB: 1955   Date of Visit: 10/20/2022     Janeth Young NP  [address] [address]  [address] [address]  [address] [facility] [address]    Dear Janeth Young NP,      Thank you for referring Mr. Tammy Zhu to [facility] for evaluation. My notes for this consultation are attached. If you have questions, please do not hesitate to call me. I look forward to following your patient along with you.       Sincerely,    Augie Varela MD

## 2022-10-20 NOTE — LETTER
10/20/2022 1:43 PM    Mr. Hilary Zayas  [address] [facility] [address]    To Whom It May Concern,    Mr. Roann Gilford is followed by me for his diabetes. He is on multiple doses of insulin per day and is testing his blood sugars multiple times per day. Please allow Mr. Roann Gilford to keep his diabetic supplies with him on the plane.     Sincerely,      Eduin Su MD

## 2022-12-12 RX ORDER — ATORVASTATIN CALCIUM 20 MG/1
TABLET, FILM COATED ORAL
Qty: 90 TABLET | Refills: 3 | Status: SHIPPED | OUTPATIENT
Start: 2022-12-12

## 2023-02-01 DIAGNOSIS — E78.2 MIXED HYPERLIPIDEMIA: ICD-10-CM

## 2023-02-01 DIAGNOSIS — I10 ESSENTIAL HYPERTENSION: ICD-10-CM

## 2023-02-01 DIAGNOSIS — Z79.4 TYPE 2 DIABETES MELLITUS WITH DIABETIC POLYNEUROPATHY, WITH LONG-TERM CURRENT USE OF INSULIN (HCC): ICD-10-CM

## 2023-02-01 DIAGNOSIS — E11.42 TYPE 2 DIABETES MELLITUS WITH DIABETIC POLYNEUROPATHY, WITH LONG-TERM CURRENT USE OF INSULIN (HCC): ICD-10-CM

## 2023-02-09 ENCOUNTER — TELEPHONE (OUTPATIENT)
Dept: FAMILY MEDICINE CLINIC | Age: 68
End: 2023-02-09

## 2023-02-09 NOTE — TELEPHONE ENCOUNTER
Spoke with [address]. He will call back to make an appt. He needs to be seen before meds can be refilled.

## 2023-02-13 ENCOUNTER — OFFICE VISIT (OUTPATIENT)
Dept: FAMILY MEDICINE CLINIC | Age: 68
End: 2023-02-13
Payer: MEDICARE

## 2023-02-13 VITALS
BODY MASS INDEX: 38.17 KG/M2 | HEART RATE: 80 BPM | OXYGEN SATURATION: 97 % | WEIGHT: 266.6 LBS | HEIGHT: 70 IN | SYSTOLIC BLOOD PRESSURE: 120 MMHG | TEMPERATURE: 97.1 F | RESPIRATION RATE: 20 BRPM | DIASTOLIC BLOOD PRESSURE: 62 MMHG

## 2023-02-13 DIAGNOSIS — D03.39 MELANOMA IN SITU OF OTHER PARTS OF FACE (HCC): ICD-10-CM

## 2023-02-13 DIAGNOSIS — Z79.4 TYPE 2 DIABETES MELLITUS WITH DIABETIC NEUROPATHY, WITH LONG-TERM CURRENT USE OF INSULIN (HCC): ICD-10-CM

## 2023-02-13 DIAGNOSIS — Z00.00 MEDICARE ANNUAL WELLNESS VISIT, SUBSEQUENT: Primary | ICD-10-CM

## 2023-02-13 DIAGNOSIS — I10 ESSENTIAL HYPERTENSION: ICD-10-CM

## 2023-02-13 DIAGNOSIS — D21.0 BENIGN NEOPLASM OF CONNECTIVE AND OTHER SOFT TISSUE OF HEAD, FACE AND NECK: ICD-10-CM

## 2023-02-13 DIAGNOSIS — D22.9 NEVUS: ICD-10-CM

## 2023-02-13 DIAGNOSIS — Z89.511 HX OF BKA, RIGHT (HCC): ICD-10-CM

## 2023-02-13 DIAGNOSIS — E11.40 TYPE 2 DIABETES MELLITUS WITH DIABETIC NEUROPATHY, WITH LONG-TERM CURRENT USE OF INSULIN (HCC): ICD-10-CM

## 2023-02-13 PROCEDURE — 3078F DIAST BP <80 MM HG: CPT | Performed by: NURSE PRACTITIONER

## 2023-02-13 PROCEDURE — G8432 DEP SCR NOT DOC, RNG: HCPCS | Performed by: NURSE PRACTITIONER

## 2023-02-13 PROCEDURE — 3017F COLORECTAL CA SCREEN DOC REV: CPT | Performed by: NURSE PRACTITIONER

## 2023-02-13 PROCEDURE — 1101F PT FALLS ASSESS-DOCD LE1/YR: CPT | Performed by: NURSE PRACTITIONER

## 2023-02-13 PROCEDURE — 2022F DILAT RTA XM EVC RTNOPTHY: CPT | Performed by: NURSE PRACTITIONER

## 2023-02-13 PROCEDURE — G8536 NO DOC ELDER MAL SCRN: HCPCS | Performed by: NURSE PRACTITIONER

## 2023-02-13 PROCEDURE — 99214 OFFICE O/P EST MOD 30 MIN: CPT | Performed by: NURSE PRACTITIONER

## 2023-02-13 PROCEDURE — 3046F HEMOGLOBIN A1C LEVEL >9.0%: CPT | Performed by: NURSE PRACTITIONER

## 2023-02-13 PROCEDURE — 1123F ACP DISCUSS/DSCN MKR DOCD: CPT | Performed by: NURSE PRACTITIONER

## 2023-02-13 PROCEDURE — 17110 DESTRUCTION B9 LES UP TO 14: CPT | Performed by: NURSE PRACTITIONER

## 2023-02-13 PROCEDURE — G0439 PPPS, SUBSEQ VISIT: HCPCS | Performed by: NURSE PRACTITIONER

## 2023-02-13 PROCEDURE — 3074F SYST BP LT 130 MM HG: CPT | Performed by: NURSE PRACTITIONER

## 2023-02-13 PROCEDURE — G8417 CALC BMI ABV UP PARAM F/U: HCPCS | Performed by: NURSE PRACTITIONER

## 2023-02-13 PROCEDURE — G8427 DOCREV CUR MEDS BY ELIG CLIN: HCPCS | Performed by: NURSE PRACTITIONER

## 2023-02-13 RX ORDER — AMLODIPINE BESYLATE 5 MG/1
5 TABLET ORAL DAILY
Qty: 90 TABLET | Refills: 3 | Status: SHIPPED | OUTPATIENT
Start: 2023-02-13

## 2023-02-13 NOTE — ACP (ADVANCE CARE PLANNING)
Discussed importance of advanced medical directives with patient. Patient is capable of making decisions.  Phuc Thomas NP-C

## 2023-02-13 NOTE — PROGRESS NOTES
Chief Complaint   Patient presents with    Diabetes     fu    Medication Refill     YES Answers must have Comments  1. \"Have you been to the ER, urgent care clinic since your last visit? Hospitalized since your last visit? \"    [] YES   [x] NO       2. Have you seen or consulted any other health care providers outside of [address] [place] [address] since your last visit?     [] YES   [x] NO       3. For patients aged 39-70: Have you had a colorectal cancer screening such as a colonoscopy/FIT/Cologuard? Nurse/CMA to request records if not in chart   [x] YES When 03/2010, Where [facility], and Type? Colonoscopy  [] NO   [] NA, based on age    If the patient is female:      4. For female patients aged 41-77: Clive Power you had a mammogram in the last two years?  Nurse/CMA to request records if not in chart   [] YES   [] NO   [x] NA, based on age    11. For female patients aged 21-65: Clive Power you had a pap smear?   Nurse/CMA to request records if not in chart   [] YES   [] NO  [x] NA, based on age

## 2023-02-13 NOTE — PROGRESS NOTES
This is the Subsequent Medicare Annual Wellness Exam, performed 12 months or more after the Initial AWV or the last Subsequent AWV    I have reviewed the patient's medical history in detail and updated the computerized patient record. Chief Complaint   Patient presents with    Diabetes     fu    Medication Refill    Annual Wellness Visit     Subsequent      Visit Vitals  /62   Pulse 80   Temp 97.1 °F (36.2 °C) (Temporal)   Resp 20   Ht 5' 9.5\" (1.765 m)   Wt 266 lb 9.6 oz (120.9 kg)   SpO2 97%   BMI 38.81 kg/m²    General: Alert and oriented. No acute distress. Well nourished  HEENT :  Eyes: pupils equal, round, react to light and accommodation. Nose: patent. Mouth and throat is clear. Neck:supple full range of motion no thyromegaly. Trachea midline, No carotid bruits. No significant lymphadenopathy  Lungs[de-identified] clear to auscultation without wheezes, rales, or rhonchi. Heart :RRR, S1 & S2 are normal intensity. No murmur; no gallop  Extremities: without clubbing, cyanosis, or edema Rt BKA healed no s/s of infection or breakdown. Pulses: radial and femoral pulses are normal  Neuro: HMF intact. Cranial nerves II through XII grossly normal.  Integumentary suspicious nevus on forehead , pigmented irregular shape 0.2 cm x0.1 cm   Assessment/Plan   Education and counseling provided:  Are appropriate based on today's review and evaluation    1. Medicare annual wellness visit, subsequent  2. Type 2 diabetes mellitus with diabetic neuropathy, with long-term current use of insulin ([facility] [address]  Followed by Dr. Kyle Larkin (endocrinologist)  Mr. Edel Moses endorses he has a lab appointment on Wednesday in [address] [place] [address]; He endorses insulin glargine 62 units SQ in the morning and 62 units at night. Humalog 46 units SQ correction scale with meals. 3. Essential hypertension   BP in goal   Continue Coreg 12.5 mg po bid  po and amlodipine 5 mg po daily. 4. Hx of BKA, right ([address]   Wears prosthetic device.  No areas of breakdown or redness. Depression Risk Factor Screening     3 most recent PHQ Screens 2/13/2023   Little interest or pleasure in doing things Not at all   Feeling down, depressed, irritable, or hopeless Not at all   Total Score PHQ 2 0   Trouble falling or staying asleep, or sleeping too much -   Feeling tired or having little energy -   Poor appetite, weight loss, or overeating -   Feeling bad about yourself - or that you are a failure or have let yourself or your family down -   Trouble concentrating on things such as school, work, reading, or watching TV -   Moving or speaking so slowly that other people could have noticed; or the opposite being so fidgety that others notice -   Thoughts of being better off dead, or hurting yourself in some way -   PHQ 9 Score -       Alcohol & Drug Abuse Risk Screen    Do you average more than 1 drink per night or more than 7 drinks a week: No    In the past three months have you have had more than 4 drinks containing alcohol on one occasion: No          Functional Ability and Level of Safety    Hearing: Hearing is good. Activities of Daily Living: The home contains: handrails  Patient does total self care      Ambulation: with no difficulty     Fall Risk:  Fall Risk Assessment, last 12 mths 2/13/2023   Able to walk? Yes   Fall in past 12 months? 0   Do you feel unsteady?  0   Are you worried about falling 0      Abuse Screen:  Patient is not abused       Cognitive Screening    Has your family/caregiver stated any concerns about your memory: no     Cognitive Screening: Normal - Clock Drawing Test    Health Maintenance Due     Health Maintenance Due   Topic Date Due    DTaP/Tdap/Td series (1 - Tdap) Never done    Shingles Vaccine (1 of 2) Never done    COVID-19 Vaccine (4 - Booster) 01/08/2022    Flu Vaccine (1) 08/01/2022    Diabetic Alb to Cr ratio (uACR) test  01/20/2023    GFR test (Diabetes, CKD 3-4, OR last GFR 15-59)  01/20/2023    Lipid Screen  01/20/2023    Depression Screen  02/08/2023    Medicare Yearly Exam  02/09/2023       Patient Care Team   Patient Care Team:  Carol Taveras NP as PCP - General (Nurse Practitioner)  Carol Taveras NP as PCP - [facility]  August Barahona MD as Consulting Provider (Internal Medicine Physician)  Cipriano Enriquez MD as Consulting Provider (Endocrinology Physician)    History     Patient Active Problem List   Diagnosis Code    Essential hypertension I10    Other hyperlipidemia E78.49    Melanoma in situ of other parts of face [facility]) D03.39     Past Medical History:   Diagnosis Date    Diabetes ([facility] [address]     High cholesterol     Hypertension     Nausea & vomiting     Osteomyelitis of right foot ([facility] [address] 1/17/2017      Past Surgical History:   Procedure Laterality Date    HX CATARACT REMOVAL Bilateral 2012    HX ORTHOPAEDIC Right     great toe amputation    HX ORTHOPAEDIC Left     2 small toes amputated    HX ORTHOPAEDIC Right 03/27/2019    BKA     Current Outpatient Medications   Medication Sig Dispense Refill    atorvastatin (LIPITOR) 20 mg tablet TAKE 1 TABLET BY MOUTH ONCE DAILY 90 Tablet 3    carvediloL (COREG) 12.5 mg tablet TAKE 1 TABLET BY MOUTH  TWICE DAILY 180 Tablet 0    insulin glargine (Lantus Solostar U-100 Insulin) 100 unit/mL (3 mL) inpn 55 units in the morning and 55 units in the evening 90 mL 3    glucose blood VI test strips (Accu-Chek Guide test strips) strip DX E 11.65 Check blood glucose bid and prn 200 Strip 5    dulaglutide (Trulicity) 3 QL/4.6 mL pnij 3 mg by SubCUTAneous route every seven (7) days. 12 Each 3    insulin lispro (HumaLOG KwikPen Insulin) 100 unit/mL kwikpen INJECT SUBCUTANEOUSLY 38  UNITS WITH BREAKFAST 38  UNITS WITH LUNCH AND 38  UNITS WITH DINNER, plus correction. Max daily dose of 150 units (Patient taking differently: INJECT SUBCUTANEOUSLY 38  UNITS WITH BREAKFAST  AND 38  UNITS WITH DINNER, plus correction.  Max daily dose of 150 units) 105 mL 3    BD Karine 2nd Gen Pen Needle 32 gauge x 5/32\" ndle USE TWICE DAILY AS DIRECTED 100 Pen Needle 11    amLODIPine (NORVASC) 5 mg tablet TAKE 1 TABLET BY MOUTH  DAILY FOR HIGH BLOOD  PRESSURE 90 Tablet 3    lancets misc Test as directed 1 Each 11    Blood-Glucose Meter (Accu-Chek Cecille Plus Meter) misc 1 Units by SubCUTAneous route Before breakfast, lunch, dinner and at bedtime. 1 Each 0    Blood Sugar Diagnostic, Drum (Accu-Chek Compact Plus Test) strp 1 Each by Does Not Apply route four (4) times daily as needed for Other (bllod glucose). 3 x 17   Indications: IDDM 100 Strip 11    lancets (Accu-Chek Multiclix Lancet) misc by SubCUTAneous route Before breakfast, lunch, dinner and at bedtime. 200 Each 11    multivitamin (ONE A DAY) tablet Take 1 Tab by mouth daily. Not a current medication      Lancing Device (LANCING DEVICE WITH LANCETS) misc 1 Each by Does Not Apply route four (4) times daily. 1 Each 0    aspirin 81 mg chewable tablet Take 81 mg by mouth daily. Not a current medication       No Known Allergies    Family History   Problem Relation Age of Onset    Heart Disease Mother     No Known Problems Father     Lung Disease Sister     No Known Problems Brother     No Known Problems Sister     No Known Problems Sister      Social History     Tobacco Use    Smoking status: Never    Smokeless tobacco: Never   Substance Use Topics    Alcohol use: No         Gustabo Skiff         Subjective:    Clearance Palmyra is a 69 y.o. male who presents for lesion removal. We have discussed this procedure, including option of not performing surgery, technique of surgery and potential for scarring at an earlier visit. Oubjective:   Patient appears well.    Visit Vitals  /62   Pulse 80   Temp 97.1 °F (36.2 °C) (Temporal)   Resp 20   Ht 5' 9.5\" (1.765 m)   Wt 266 lb 9.6 oz (120.9 kg)   SpO2 97%   BMI 38.81 kg/m²     Skin: suspicious nevus on forehead 0.2 cm x 0.1 cm      Assessment:   suspicious lesion    Procedure Note:   [id number] [address] PROCEDURE PROGRESS NOTE        Chart reviewed for the following:   Colonel Charli RENTERIA NP, have reviewed the History, Physical and updated the Allergic reactions for [address] performed immediately prior to start of procedure:   Colonel Charli RENTERIA NP, have performed the following reviews on Na Peterson prior to the start of the procedure:            * Patient was identified by name and date of birth   * Agreement on procedure being performed was verified  * Risks and Benefits explained to the patient  * Procedure site verified and marked as necessary  * Patient was positioned for comfort  * Consent was signed and verified     Time: 0800      Date of procedure: 2/13/2023    Procedure performed by:  Colonel Charli NP    Provider assisted by:  none     Patient assisted by: self    How tolerated by patient: tolerated the procedure well with no complications    Post Procedural Pain Scale: 2 - Hurts Little Bit    Comments: , Patient education provided with teachback          After informed consent was obtained, using alcohol for cleansing and  liquid nitrogen   for anesthetic, with sterile technique, cryotherapy with liquid nitrogen was performed. Antibiotic dressing is applied, and wound care instructions provided. Be alert for any signs of cutaneous infection. The procedure was well tolerated without complications. Follow up: the patient may return prn.    Christine  ROBERT

## 2023-02-15 ENCOUNTER — CLINICAL SUPPORT (OUTPATIENT)
Dept: FAMILY MEDICINE CLINIC | Age: 68
End: 2023-02-15
Payer: MEDICARE

## 2023-02-15 VITALS — TEMPERATURE: 97.5 F

## 2023-02-15 DIAGNOSIS — E11.40 TYPE 2 DIABETES MELLITUS WITH DIABETIC NEUROPATHY, WITH LONG-TERM CURRENT USE OF INSULIN (HCC): ICD-10-CM

## 2023-02-15 DIAGNOSIS — I10 ESSENTIAL HYPERTENSION: ICD-10-CM

## 2023-02-15 DIAGNOSIS — Z00.00 MEDICARE ANNUAL WELLNESS VISIT, SUBSEQUENT: ICD-10-CM

## 2023-02-15 DIAGNOSIS — Z89.511 HX OF BKA, RIGHT (HCC): ICD-10-CM

## 2023-02-15 DIAGNOSIS — Z79.4 TYPE 2 DIABETES MELLITUS WITH DIABETIC NEUROPATHY, WITH LONG-TERM CURRENT USE OF INSULIN (HCC): ICD-10-CM

## 2023-02-15 LAB
ALBUMIN UR QL STRIP: 30 MG/L
CREATININE, URINE POC: 200 MG/DL
MICROALBUMIN/CREAT RATIO POC: <30 MG/G

## 2023-02-15 PROCEDURE — 82044 UR ALBUMIN SEMIQUANTITATIVE: CPT | Performed by: NURSE PRACTITIONER

## 2023-02-15 PROCEDURE — 36415 COLL VENOUS BLD VENIPUNCTURE: CPT | Performed by: NURSE PRACTITIONER

## 2023-02-15 NOTE — PROGRESS NOTES
Chief Complaint   Patient presents with    Labs Only     BW     Pt presents to clinic for lab work, denies sick sx today. Labs drawn from [facility], 1 attempt, tolerated well. WGB  2 SST and 1 lavender. The results needs to go to Dr. Karen Wei / [facility], F- [phone], P- [phone]. Kane James put in similar orders so will order from her lab orders and send results over Endocrinology. No Micro Abl was sent out, done as POC per Twila's order.

## 2023-02-16 LAB
ALBUMIN SERPL-MCNC: 3.3 G/DL (ref 3.5–5)
ALBUMIN/GLOB SERPL: 0.8 (ref 1.1–2.2)
ALP SERPL-CCNC: 94 U/L (ref 45–117)
ALT SERPL-CCNC: 42 U/L (ref 12–78)
ANION GAP SERPL CALC-SCNC: 9 MMOL/L (ref 5–15)
AST SERPL-CCNC: 28 U/L (ref 15–37)
BASOPHILS # BLD: 0 K/UL (ref 0–0.1)
BASOPHILS NFR BLD: 0 % (ref 0–1)
BILIRUB SERPL-MCNC: 0.4 MG/DL (ref 0.2–1)
BUN SERPL-MCNC: 12 MG/DL (ref 6–20)
BUN/CREAT SERPL: 13 (ref 12–20)
CALCIUM SERPL-MCNC: 8.9 MG/DL (ref 8.5–10.1)
CHLORIDE SERPL-SCNC: 104 MMOL/L (ref 97–108)
CHOLEST SERPL-MCNC: 94 MG/DL
CO2 SERPL-SCNC: 27 MMOL/L (ref 21–32)
COMMENT, HOLDF: NORMAL
CREAT SERPL-MCNC: 0.89 MG/DL (ref 0.7–1.3)
DIFFERENTIAL METHOD BLD: NORMAL
EOSINOPHIL # BLD: 0.1 K/UL (ref 0–0.4)
EOSINOPHIL NFR BLD: 1 % (ref 0–7)
ERYTHROCYTE [DISTWIDTH] IN BLOOD BY AUTOMATED COUNT: 14.5 % (ref 11.5–14.5)
EST. AVERAGE GLUCOSE BLD GHB EST-MCNC: 189 MG/DL
GLOBULIN SER CALC-MCNC: 3.9 G/DL (ref 2–4)
GLUCOSE SERPL-MCNC: 173 MG/DL (ref 65–100)
HBA1C MFR BLD: 8.2 % (ref 4–5.6)
HCT VFR BLD AUTO: 49.3 % (ref 36.6–50.3)
HDLC SERPL-MCNC: 38 MG/DL
HDLC SERPL: 2.5 (ref 0–5)
HGB BLD-MCNC: 15.6 G/DL (ref 12.1–17)
IMM GRANULOCYTES # BLD AUTO: 0 K/UL (ref 0–0.04)
IMM GRANULOCYTES NFR BLD AUTO: 0 % (ref 0–0.5)
LDLC SERPL CALC-MCNC: 27.2 MG/DL (ref 0–100)
LYMPHOCYTES # BLD: 1.8 K/UL (ref 0.8–3.5)
LYMPHOCYTES NFR BLD: 21 % (ref 12–49)
MCH RBC QN AUTO: 30.2 PG (ref 26–34)
MCHC RBC AUTO-ENTMCNC: 31.6 G/DL (ref 30–36.5)
MCV RBC AUTO: 95.4 FL (ref 80–99)
MONOCYTES # BLD: 0.5 K/UL (ref 0–1)
MONOCYTES NFR BLD: 6 % (ref 5–13)
NEUTS SEG # BLD: 5.9 K/UL (ref 1.8–8)
NEUTS SEG NFR BLD: 72 % (ref 32–75)
NRBC # BLD: 0 K/UL (ref 0–0.01)
NRBC BLD-RTO: 0 PER 100 WBC
PLATELET # BLD AUTO: 235 K/UL (ref 150–400)
PMV BLD AUTO: 11.6 FL (ref 8.9–12.9)
POTASSIUM SERPL-SCNC: 3.9 MMOL/L (ref 3.5–5.1)
PROT SERPL-MCNC: 7.2 G/DL (ref 6.4–8.2)
RBC # BLD AUTO: 5.17 M/UL (ref 4.1–5.7)
SAMPLES BEING HELD,HOLD: NORMAL
SODIUM SERPL-SCNC: 140 MMOL/L (ref 136–145)
TRIGL SERPL-MCNC: 144 MG/DL (ref ?–150)
VLDLC SERPL CALC-MCNC: 28.8 MG/DL
WBC # BLD AUTO: 8.3 K/UL (ref 4.1–11.1)

## 2023-02-20 RX ORDER — CARVEDILOL 12.5 MG/1
TABLET ORAL
Qty: 180 TABLET | Refills: 3 | Status: SHIPPED | OUTPATIENT
Start: 2023-02-20

## 2023-02-22 ENCOUNTER — OFFICE VISIT (OUTPATIENT)
Dept: ENDOCRINOLOGY | Age: 68
End: 2023-02-22
Payer: MEDICARE

## 2023-02-22 VITALS
WEIGHT: 265.4 LBS | SYSTOLIC BLOOD PRESSURE: 126 MMHG | BODY MASS INDEX: 37.99 KG/M2 | DIASTOLIC BLOOD PRESSURE: 64 MMHG | HEIGHT: 70 IN | HEART RATE: 74 BPM

## 2023-02-22 DIAGNOSIS — E11.42 TYPE 2 DIABETES MELLITUS WITH DIABETIC POLYNEUROPATHY, WITH LONG-TERM CURRENT USE OF INSULIN (HCC): Primary | ICD-10-CM

## 2023-02-22 DIAGNOSIS — Z79.4 TYPE 2 DIABETES MELLITUS WITH DIABETIC POLYNEUROPATHY, WITH LONG-TERM CURRENT USE OF INSULIN (HCC): Primary | ICD-10-CM

## 2023-02-22 DIAGNOSIS — I10 ESSENTIAL HYPERTENSION: ICD-10-CM

## 2023-02-22 DIAGNOSIS — E78.2 MIXED HYPERLIPIDEMIA: ICD-10-CM

## 2023-02-22 PROCEDURE — 1101F PT FALLS ASSESS-DOCD LE1/YR: CPT | Performed by: INTERNAL MEDICINE

## 2023-02-22 PROCEDURE — 3078F DIAST BP <80 MM HG: CPT | Performed by: INTERNAL MEDICINE

## 2023-02-22 PROCEDURE — 1123F ACP DISCUSS/DSCN MKR DOCD: CPT | Performed by: INTERNAL MEDICINE

## 2023-02-22 PROCEDURE — 99214 OFFICE O/P EST MOD 30 MIN: CPT | Performed by: INTERNAL MEDICINE

## 2023-02-22 PROCEDURE — 2022F DILAT RTA XM EVC RTNOPTHY: CPT | Performed by: INTERNAL MEDICINE

## 2023-02-22 PROCEDURE — G8427 DOCREV CUR MEDS BY ELIG CLIN: HCPCS | Performed by: INTERNAL MEDICINE

## 2023-02-22 PROCEDURE — G8536 NO DOC ELDER MAL SCRN: HCPCS | Performed by: INTERNAL MEDICINE

## 2023-02-22 PROCEDURE — 3017F COLORECTAL CA SCREEN DOC REV: CPT | Performed by: INTERNAL MEDICINE

## 2023-02-22 PROCEDURE — G8417 CALC BMI ABV UP PARAM F/U: HCPCS | Performed by: INTERNAL MEDICINE

## 2023-02-22 PROCEDURE — 3074F SYST BP LT 130 MM HG: CPT | Performed by: INTERNAL MEDICINE

## 2023-02-22 PROCEDURE — 3052F HG A1C>EQUAL 8.0%<EQUAL 9.0%: CPT | Performed by: INTERNAL MEDICINE

## 2023-02-22 PROCEDURE — G8432 DEP SCR NOT DOC, RNG: HCPCS | Performed by: INTERNAL MEDICINE

## 2023-02-22 RX ORDER — TIRZEPATIDE 5 MG/.5ML
5 INJECTION, SOLUTION SUBCUTANEOUS
Qty: 12 PEN | Refills: 0 | Status: SHIPPED | OUTPATIENT
Start: 2023-02-22

## 2023-02-22 NOTE — LETTER
2/22/2023    Patient: Tee Davis   YOB: 1955   Date of Visit: 2/22/2023     Ginette Mora NP  [address] [address]  [address] [address]  [address] [facility] [address]    Dear Ginette Mora NP,      Thank you for referring Mr. Arianna Parnell to [facility] for evaluation. My notes for this consultation are attached. If you have questions, please do not hesitate to call me. I look forward to following your patient along with you.       Sincerely,    Philip Ware MD

## 2023-02-22 NOTE — PROGRESS NOTES
Chief Complaint   Patient presents with    Diabetes     Pcp and pharmacy verified     History of Present Illness: Juanita Van is a 69 y.o. male here for follow up of diabetes. He was diagnosed with diabetes 1993 . Since our last visit pt has been sending me his BG readings every 3 weeks and we have been adjusting his insulin regimen as needed. I recently recommended he increase his Lantus to 66 units per day, continue the Humalog 46 units with each meal, plus 2:50 correction for BG >239 and Trulicity 8.8LR weekly. His A1C last week was 8.2%. \"I got a letter from [facility] saying they were out of Trulicity and I needed a substitute. Pt denies any recent illnesses, injuries or hospitalizations. Mr. Eric Martin has been sending me his BG readings every month and we have been adjusting his insulin regimen. Pt is testing his BGs twice per day. His FBGs are running in the 160-200s range with pre-dinner in the 90-250s range. He denies issues of hypoglycemia. A typical day is as follows:  Pt wakes around 8-830AM. He will test his sugar when he gets up and take his Lantus and Humalog at that time. - He has breakfast around 830-9AM, this AM he had granola, blueberries and milk. - He reports he is not snacking during the day. - He is not eating lunch typically. - He \"is trying to stay away from snacking in the afternoon. \".  - He has dinner around 530-6PM, last night he had a grilled cheese sandwich and pink lemonade. - He reports he is not snacking in the evening. Exercise consists of \"not much, my back has been bothering me. I am going to PT and chiropractics I am also doing a yoga class. \"    Pt is s/p right BKA in March 2018. \"I had a diabetic infection that went south\". He saw Dr. Rohit Greene \"my stump doctor\" in October 2022. \"I will be having some changes to my prostetic next week. No hx of MI or CVA.     Pt has hx of neuropathy \"but it is not bad\" and he is not taking any agents for this. Pt denies hx of nephropathy. Last eye exam was in February 2023. \"they said I had some diabetic damage in my retina of both eyes and they are treating me with injections. \"     Pt notes he has been through DM education in the past.    Current Outpatient Medications   Medication Sig    tirzepatide (Mounjaro) 5 mg/0.5 mL pnij 5 mg by SubCUTAneous route every seven (7) days. carvediloL (COREG) 12.5 mg tablet TAKE 1 TABLET BY MOUTH TWICE  DAILY    amLODIPine (NORVASC) 5 mg tablet Take 1 Tablet by mouth daily. FOR HIGH BLOOD PRESSURE    atorvastatin (LIPITOR) 20 mg tablet TAKE 1 TABLET BY MOUTH ONCE DAILY    insulin glargine (Lantus Solostar U-100 Insulin) 100 unit/mL (3 mL) inpn 55 units in the morning and 55 units in the evening (Patient taking differently: 66 units in the morning and 66 units in the evening)    glucose blood VI test strips (Accu-Chek Guide test strips) strip DX E 11.65 Check blood glucose bid and prn    insulin lispro (HumaLOG KwikPen Insulin) 100 unit/mL kwikpen INJECT SUBCUTANEOUSLY 38  UNITS WITH BREAKFAST 38  UNITS WITH LUNCH AND 38  UNITS WITH DINNER, plus correction. Max daily dose of 150 units (Patient taking differently: INJECT SUBCUTANEOUSLY 46 UNITS WITH BREAKFAST  AND 46  UNITS WITH DINNER, plus correction. Max daily dose of 150 units)    BD Karine 2nd Gen Pen Needle 32 gauge x 5/32\" ndle USE TWICE DAILY AS DIRECTED    lancets misc Test as directed    Blood-Glucose Meter (Accu-Chek Cecille Plus Meter) misc 1 Units by SubCUTAneous route Before breakfast, lunch, dinner and at bedtime. Blood Sugar Diagnostic, Drum (Accu-Chek Compact Plus Test) strp 1 Each by Does Not Apply route four (4) times daily as needed for Other (bllod glucose). 3 x 17   Indications: IDDM    lancets (Accu-Chek Multiclix Lancet) misc by SubCUTAneous route Before breakfast, lunch, dinner and at bedtime. multivitamin (ONE A DAY) tablet Take 1 Tab by mouth daily.  Not a current medication Lancing Device (LANCING DEVICE WITH LANCETS) misc 1 Each by Does Not Apply route four (4) times daily. aspirin 81 mg chewable tablet Take 81 mg by mouth daily. Not a current medication     No current facility-administered medications for this visit. No Known Allergies  Review of Systems:  - Eyes: no blurry vision or double vision  - Cardiovascular: no chest pain  - Respiratory: no shortness of breath  - Musculoskeletal: no myalgias  - Neurological: no numbness/tingling in extremities    Physical Examination:  Blood pressure 126/64, pulse 74, height 5' 9.5\" (1.765 m), weight 265 lb 6.4 oz (120.4 kg). General: pleasant, no distress, good eye contact   Neck: no carotid bruits  Cardiovascular: regular, normal rate, nl s1 and s2, no m/r/g, 2+ DP pulses   Respiratory: clear bilaterally  Integumentary: no edema, no foot ulcers,   Psychiatric: normal mood and affect    Diabetic foot exam:     Left Foot:   Visual Exam: callous - present and amputation- Toes 4-5 surgically absent   Pulse DP: 1+ (weak)   Filament test: absent sensation    Vibratory sensation: absent      Right Foot:   surgically absent. Pt has prosthetic. Data Reviewed:   Component      Latest Ref Rng & Units 2/15/2023   WBC      4.1 - 11.1 K/uL 8.3   RBC      4.10 - 5.70 M/uL 5.17   HGB      12.1 - 17.0 g/dL 15.6   HCT      36.6 - 50.3 % 49.3   MCV      80.0 - 99.0 FL 95.4   MCH      26.0 - 34.0 PG 30.2   MCHC      30.0 - 36.5 g/dL 31.6   RDW      11.5 - 14.5 % 14.5   PLATELET      716 - 311 K/uL 235   MPV      8.9 - 12.9 FL 11.6   NRBC      0  WBC 0.0   ABSOLUTE NRBC      0.00 - 0.01 K/uL 0.00   NEUTROPHILS      32 - 75 % 72   LYMPHOCYTES      12 - 49 % 21   MONOCYTES      5 - 13 % 6   EOSINOPHILS      0 - 7 % 1   BASOPHILS      0 - 1 % 0   IMMATURE GRANULOCYTES      0.0 - 0.5 % 0   ABS. NEUTROPHILS      1.8 - 8.0 K/UL 5.9   ABS. LYMPHOCYTES      0.8 - 3.5 K/UL 1.8   ABS. MONOCYTES      0.0 - 1.0 K/UL 0.5   ABS.  EOSINOPHILS      0.0 - 0.4 K/UL 0.1   ABS. BASOPHILS      0.0 - 0.1 K/UL 0.0   ABS. IMM. GRANS.      0.00 - 0.04 K/UL 0.0   DF       AUTOMATED   Sodium      136 - 145 mmol/L 140   Potassium      3.5 - 5.1 mmol/L 3.9   Chloride      97 - 108 mmol/L 104   CO2      21 - 32 mmol/L 27   Anion gap      5 - 15 mmol/L 9   Glucose      65 - 100 mg/dL 173 (H)   BUN      6 - 20 MG/DL 12   Creatinine      0.70 - 1.30 MG/DL 0.89   BUN/Creatinine ratio      12 - 20   13   eGFR      >60 ml/min/1.73m2 >60   Calcium      8.5 - 10.1 MG/DL 8.9   Bilirubin, total      0.2 - 1.0 MG/DL 0.4   ALT      12 - 78 U/L 42   AST      15 - 37 U/L 28   Alk. phosphatase      45 - 117 U/L 94   Protein, total      6.4 - 8.2 g/dL 7.2   Albumin      3.5 - 5.0 g/dL 3.3 (L)   Globulin      2.0 - 4.0 g/dL 3.9   A-G Ratio      1.1 - 2.2   0.8 (L)   Cholesterol, total      <200 MG/DL 94   Triglyceride      <150 MG/   HDL Cholesterol      MG/DL 38   LDL, calculated      0 - 100 MG/DL 27.2   VLDL, calculated      MG/DL 28.8   CHOL/HDL Ratio      0.0 - 5.0   2.5   Hemoglobin A1c, (calculated)      4.0 - 5.6 % 8.2 (H)   Est. average glucose      mg/dL 189       Assessment/Plan:   1) DM > His A1C last week was 8.2%. He has not had issues of hypoglycemia. Pt to continue the Lantus 66 units daily, Humalog 46 units with each meal plus 2:50 for BG >150 and I will order Mounjaro 5.0mg weekly since the Trulicity 2.9FA is not available. Pt to test his BGs three times per day and send me BG readings in 4 weeks. We can adjust his Dar Dole as needed, if he is tolerating it and his BGs increase after the change. With his hx of neuropathy and amputations he would benefit from DM shoes and inserts. 2) HTN > His BP today was 126/64. Will not make any changes to his regimen at this time. 3) HLD > His lipid panel was at goal in February 2023. Pt to continue the Pravastatin 20mg daily. Pt voices understanding and agreement with the plan.     RTC 4 months    Copy sent to:  Mirtha Kam Leida Crowder

## 2023-02-23 ENCOUNTER — TELEPHONE (OUTPATIENT)
Dept: ENDOCRINOLOGY | Age: 68
End: 2023-02-23

## 2023-02-23 NOTE — TELEPHONE ENCOUNTER
Received a fax from [address] [place] [address]  stating that Evie Saldivar is not covered under the pt's insurance. Covered alternatives are Trulicity and Bydureon.

## 2023-02-27 NOTE — TELEPHONE ENCOUNTER
If the prescription gets denied, I ordered the 2 shots of 1.5mg per week, then he can back down to 1.5 per week till the 3.0mg is back in stock.

## 2023-02-28 NOTE — TELEPHONE ENCOUNTER
Per [facility], insurance will not cover two 1.5 mg injections weekly. Advised the patient to call smaller pharmacies in his area and let us know if he finds a pharmacy that has it in [address] [facility] ([facility] has 3 mg in stock, but patient states too far to drive).

## 2023-05-02 RX ORDER — INSULIN LISPRO 100 [IU]/ML
INJECTION, SOLUTION INTRAVENOUS; SUBCUTANEOUS
Qty: 135 ML | Refills: 3 | Status: SHIPPED | OUTPATIENT
Start: 2023-05-02

## 2023-05-11 RX ORDER — INSULIN GLARGINE 100 [IU]/ML
INJECTION, SOLUTION SUBCUTANEOUS
Qty: 225 ML | Refills: 0 | Status: SHIPPED
Start: 2023-05-11 | End: 2023-05-17 | Stop reason: SDUPTHER

## 2023-05-17 RX ORDER — INSULIN GLARGINE 100 [IU]/ML
INJECTION, SOLUTION SUBCUTANEOUS
Qty: 225 ML | Refills: 1 | Status: SHIPPED | OUTPATIENT
Start: 2023-05-17

## 2023-06-28 ENCOUNTER — OFFICE VISIT (OUTPATIENT)
Age: 68
End: 2023-06-28
Payer: MEDICARE

## 2023-06-28 VITALS
HEIGHT: 70 IN | WEIGHT: 270.2 LBS | BODY MASS INDEX: 38.68 KG/M2 | DIASTOLIC BLOOD PRESSURE: 64 MMHG | SYSTOLIC BLOOD PRESSURE: 136 MMHG | HEART RATE: 61 BPM

## 2023-06-28 DIAGNOSIS — I10 ESSENTIAL (PRIMARY) HYPERTENSION: ICD-10-CM

## 2023-06-28 DIAGNOSIS — Z79.4 TYPE 2 DIABETES MELLITUS WITH DIABETIC POLYNEUROPATHY, WITH LONG-TERM CURRENT USE OF INSULIN (HCC): Primary | ICD-10-CM

## 2023-06-28 DIAGNOSIS — E78.2 MIXED HYPERLIPIDEMIA: ICD-10-CM

## 2023-06-28 DIAGNOSIS — E11.42 TYPE 2 DIABETES MELLITUS WITH DIABETIC POLYNEUROPATHY, WITH LONG-TERM CURRENT USE OF INSULIN (HCC): Primary | ICD-10-CM

## 2023-06-28 LAB — HBA1C MFR BLD: 9.4 %

## 2023-06-28 PROCEDURE — 83036 HEMOGLOBIN GLYCOSYLATED A1C: CPT | Performed by: INTERNAL MEDICINE

## 2023-06-28 PROCEDURE — 3078F DIAST BP <80 MM HG: CPT | Performed by: INTERNAL MEDICINE

## 2023-06-28 PROCEDURE — 2022F DILAT RTA XM EVC RTNOPTHY: CPT | Performed by: INTERNAL MEDICINE

## 2023-06-28 PROCEDURE — 99215 OFFICE O/P EST HI 40 MIN: CPT | Performed by: INTERNAL MEDICINE

## 2023-06-28 PROCEDURE — G8417 CALC BMI ABV UP PARAM F/U: HCPCS | Performed by: INTERNAL MEDICINE

## 2023-06-28 PROCEDURE — G8427 DOCREV CUR MEDS BY ELIG CLIN: HCPCS | Performed by: INTERNAL MEDICINE

## 2023-06-28 PROCEDURE — 3052F HG A1C>EQUAL 8.0%<EQUAL 9.0%: CPT | Performed by: INTERNAL MEDICINE

## 2023-06-28 PROCEDURE — 1036F TOBACCO NON-USER: CPT | Performed by: INTERNAL MEDICINE

## 2023-06-28 PROCEDURE — 3075F SYST BP GE 130 - 139MM HG: CPT | Performed by: INTERNAL MEDICINE

## 2023-06-28 PROCEDURE — 3017F COLORECTAL CA SCREEN DOC REV: CPT | Performed by: INTERNAL MEDICINE

## 2023-06-28 PROCEDURE — 1123F ACP DISCUSS/DSCN MKR DOCD: CPT | Performed by: INTERNAL MEDICINE

## 2023-06-28 RX ORDER — MULTIVITAMIN
1 TABLET ORAL DAILY
COMMUNITY

## 2023-06-28 RX ORDER — TIRZEPATIDE 5 MG/.5ML
5 INJECTION, SOLUTION SUBCUTANEOUS WEEKLY
Qty: 2 ML | Refills: 0 | Status: SHIPPED | OUTPATIENT
Start: 2023-06-28

## 2023-06-28 RX ORDER — BLOOD SUGAR DIAGNOSTIC
STRIP MISCELLANEOUS
COMMUNITY
Start: 2023-05-20

## 2023-06-28 RX ORDER — PIOGLITAZONEHYDROCHLORIDE 15 MG/1
15 TABLET ORAL DAILY
Qty: 90 TABLET | Refills: 3 | Status: SHIPPED | OUTPATIENT
Start: 2023-06-28

## 2023-06-29 ENCOUNTER — CLINICAL DOCUMENTATION (OUTPATIENT)
Age: 68
End: 2023-06-29

## 2023-07-24 RX ORDER — TIRZEPATIDE 5 MG/.5ML
INJECTION, SOLUTION SUBCUTANEOUS
Qty: 2 ML | Refills: 11 | Status: SHIPPED | OUTPATIENT
Start: 2023-07-24

## 2023-07-30 RX ORDER — FLURBIPROFEN SODIUM 0.3 MG/ML
SOLUTION/ DROPS OPHTHALMIC
Qty: 450 EACH | Refills: 1 | Status: SHIPPED | OUTPATIENT
Start: 2023-07-30

## 2023-08-18 NOTE — PROGRESS NOTES
Chief Complaint   Patient presents with    Diabetes     pcp and pharmacy verified     History of Present Illness: Chiquis Ruiz is a 74 y.o. male here for follow up of diabetes. He was diagnosed with diabetes 1993 . His A1C today was 7.7%. Mr. Itz Heath has been sending me his BG readings every month and we have been adjusting his insulin regimen. Pt is testing his BGs twice per day. His FBGs are running in the 150-250s range with pre-dinner in the 110-300s range. He denies issues of hypoglycemia. Pt is taking Lantus 55 units in the morning and 55 units HS and Humalog 38 units with breakfast and dinner plus 2:50 correction for BG >150. Pt is taking Trulicity 5.3DS weekly. He has not been having any complications or side effects from the Trulicity. Pt has received both COVID vaccinations (Foster Greer) and a booster (Gabino Colón). A typical day is as follows:  Pt wakes around 630-7AM. He will test his sugar when he gets up and take his Lantus and Humalog at that time. - He has breakfast around 830-9AM, this AM he had breakfast quiche and water. - He reports he is not snacking during the day. - He will have lunch 2 days per week, around 2PM, yesterday he did not have lunch. - He will will have an afternoon snack of \"something\". - He has dinner around 6PM, last night he had a roast beef sandwich and water. - He tries to not snack after dinner. \"If I do then it will cause my sugars to run higher. If I have a snack it is SF popcicles. \"    Exercise consists of hauling fire wood. Pt is s/p right BKA in March 2018. \"I had a diabetic infection that went south\". He follows with Dr. Edna villasenor doctor\" in October 2022. No hx of MI or CVA. Pt has hx of neuropathy \"but it is not bad\" and he is not taking any agents for this. Pt denies hx of nephropathy. Last eye exam was in May 2022.  \"they said I had some diabetic damage in my retina of both eyes and they are going to go in an fix it Return if symptoms worsen or if any new or alarming signs or symptoms arise      next week\". Pt notes he has been through DM education in the past.    Current Outpatient Medications   Medication Sig    dulaglutide (Trulicity) 3 AF/2.4 mL pnij 3 mg by SubCUTAneous route every seven (7) days.  insulin lispro (HumaLOG KwikPen Insulin) 100 unit/mL kwikpen INJECT SUBCUTANEOUSLY 38  UNITS WITH BREAKFAST 38  UNITS WITH LUNCH AND 38  UNITS WITH DINNER, plus correction. Max daily dose of 150 units    BD Karine 2nd Gen Pen Needle 32 gauge x 5/32\" ndle USE TWICE DAILY AS DIRECTED    amLODIPine (NORVASC) 5 mg tablet TAKE 1 TABLET BY MOUTH  DAILY FOR HIGH BLOOD  PRESSURE    glucose blood VI test strips (Accu-Chek Guide test strips) strip DX E 11.65 Check blood glucose bid and prn    carvediloL (COREG) 12.5 mg tablet TAKE 1 TABLET BY MOUTH  TWICE DAILY    atorvastatin (LIPITOR) 20 mg tablet Take 1 Tablet by mouth daily. (replaces pravastatin per formulary preference)    lancets misc Test as directed    insulin glargine (LANTUS,BASAGLAR) 100 unit/mL (3 mL) inpn 50 units in the morning and 50 units in the evening  Dx E 11.65 (Patient taking differently: 55 units in the morning and 55 units in the evening)    Blood-Glucose Meter (Accu-Chek Cecille Plus Meter) misc 1 Units by SubCUTAneous route Before breakfast, lunch, dinner and at bedtime.  Blood Sugar Diagnostic, Drum (Accu-Chek Compact Plus Test) strp 1 Each by Does Not Apply route four (4) times daily as needed for Other (bllod glucose). 3 x 17   Indications: IDDM    lancets (Accu-Chek Multiclix Lancet) misc by SubCUTAneous route Before breakfast, lunch, dinner and at bedtime.  multivitamin (ONE A DAY) tablet Take 1 Tab by mouth daily. Not a current medication    Lancing Device (LANCING DEVICE WITH LANCETS) misc 1 Each by Does Not Apply route four (4) times daily.  aspirin 81 mg chewable tablet Take 81 mg by mouth daily. Not a current medication     No current facility-administered medications for this visit.      No Known Allergies  Review of Systems:  - Eyes: no blurry vision or double vision  - Cardiovascular: no chest pain  - Respiratory: no shortness of breath  - Musculoskeletal: no myalgias  - Neurological: no numbness/tingling in extremities    Physical Examination:  Blood pressure 133/63, pulse 71, height 5' 9.5\" (1.765 m), weight 259 lb (117.5 kg). - General: pleasant, no distress, good eye contact   - Neck: no carotid bruits  - Cardiovascular: regular, normal rate, nl s1 and s2, no m/r/g, 2+ DP pulses   - Respiratory: clear bilaterally  - Integumentary: no edema, no foot ulcers,   - Psychiatric: normal mood and affect    Diabetic foot exam:     Left Foot:   Visual Exam: callous - present and amputation- Toes 4-5 surgically absent   Pulse DP: 1+ (weak)   Filament test: absent sensation    Vibratory sensation: absent      Right Foot:   surgically absent. Pt has prosthetic. Data Reviewed:   His A1C today was 7.7%    Assessment/Plan:   1) DM > His A1C today was down from 8.5% to 7.7%. Will increase the Trulicity to 5.3OO weekly. Pt is working on stopping the snacking and he has been having better BGs in the morning and predinner. Pt to continue the Lantus 55 units BID and Humalog 38 units with each meal plus 2:50 for BG >150. Pt to test his BGs three times per day and send me BG readings in 4 months. With his hx of neuropathy and amputations he would benefit from DM shoes and inserts. 2) HTN > His BP today was 133/63. Will not make any changes to his regimen at this time. 3) HLD > His lipid panel was at goal in January 2022. Pt to continue the Pravastatin 20mg daily. Pt voices understanding and agreement with the plan. RTC 4 months      Follow-up and Dispositions    · Return in about 4 months (around 10/14/2022).          Copy sent to:  Ross Zuleta

## 2023-08-30 RX ORDER — BLOOD SUGAR DIAGNOSTIC
STRIP MISCELLANEOUS
Qty: 200 STRIP | Refills: 1 | Status: SHIPPED | OUTPATIENT
Start: 2023-08-30

## 2023-10-02 RX ORDER — TIRZEPATIDE 7.5 MG/.5ML
7.5 INJECTION, SOLUTION SUBCUTANEOUS WEEKLY
Qty: 2 ML | Refills: 0 | Status: SHIPPED | OUTPATIENT
Start: 2023-10-02

## 2023-10-16 RX ORDER — INSULIN GLARGINE 100 [IU]/ML
INJECTION, SOLUTION SUBCUTANEOUS
Qty: 150 ML | Refills: 1 | Status: SHIPPED | OUTPATIENT
Start: 2023-10-16

## 2023-10-16 RX ORDER — TIRZEPATIDE 7.5 MG/.5ML
7.5 INJECTION, SOLUTION SUBCUTANEOUS WEEKLY
Qty: 6 ML | Refills: 0 | Status: SHIPPED | OUTPATIENT
Start: 2023-10-16

## 2023-10-30 ENCOUNTER — OFFICE VISIT (OUTPATIENT)
Age: 68
End: 2023-10-30
Payer: MEDICARE

## 2023-10-30 VITALS
SYSTOLIC BLOOD PRESSURE: 120 MMHG | DIASTOLIC BLOOD PRESSURE: 60 MMHG | TEMPERATURE: 98.6 F | OXYGEN SATURATION: 94 % | RESPIRATION RATE: 20 BRPM | HEART RATE: 68 BPM | HEIGHT: 70 IN | WEIGHT: 288 LBS | BODY MASS INDEX: 41.23 KG/M2

## 2023-10-30 DIAGNOSIS — I10 ESSENTIAL HYPERTENSION: Primary | ICD-10-CM

## 2023-10-30 DIAGNOSIS — Z79.4 TYPE 2 DIABETES MELLITUS WITH OTHER CIRCULATORY COMPLICATION, WITH LONG-TERM CURRENT USE OF INSULIN (HCC): ICD-10-CM

## 2023-10-30 DIAGNOSIS — E11.59 TYPE 2 DIABETES MELLITUS WITH OTHER CIRCULATORY COMPLICATION, WITH LONG-TERM CURRENT USE OF INSULIN (HCC): ICD-10-CM

## 2023-10-30 DIAGNOSIS — E78.49 OTHER HYPERLIPIDEMIA: ICD-10-CM

## 2023-10-30 DIAGNOSIS — E66.01 OBESITY, CLASS III, BMI 40-49.9 (MORBID OBESITY) (HCC): ICD-10-CM

## 2023-10-30 PROBLEM — D03.39: Status: RESOLVED | Noted: 2022-02-09 | Resolved: 2023-10-30

## 2023-10-30 PROBLEM — E11.9 TYPE 2 DIABETES MELLITUS (HCC): Status: ACTIVE | Noted: 2023-10-30

## 2023-10-30 PROCEDURE — 3052F HG A1C>EQUAL 8.0%<EQUAL 9.0%: CPT | Performed by: NURSE PRACTITIONER

## 2023-10-30 PROCEDURE — 1123F ACP DISCUSS/DSCN MKR DOCD: CPT | Performed by: NURSE PRACTITIONER

## 2023-10-30 PROCEDURE — 3078F DIAST BP <80 MM HG: CPT | Performed by: NURSE PRACTITIONER

## 2023-10-30 PROCEDURE — G8427 DOCREV CUR MEDS BY ELIG CLIN: HCPCS | Performed by: NURSE PRACTITIONER

## 2023-10-30 PROCEDURE — 1036F TOBACCO NON-USER: CPT | Performed by: NURSE PRACTITIONER

## 2023-10-30 PROCEDURE — G8484 FLU IMMUNIZE NO ADMIN: HCPCS | Performed by: NURSE PRACTITIONER

## 2023-10-30 PROCEDURE — G8417 CALC BMI ABV UP PARAM F/U: HCPCS | Performed by: NURSE PRACTITIONER

## 2023-10-30 PROCEDURE — 2022F DILAT RTA XM EVC RTNOPTHY: CPT | Performed by: NURSE PRACTITIONER

## 2023-10-30 PROCEDURE — 3017F COLORECTAL CA SCREEN DOC REV: CPT | Performed by: NURSE PRACTITIONER

## 2023-10-30 PROCEDURE — 99214 OFFICE O/P EST MOD 30 MIN: CPT | Performed by: NURSE PRACTITIONER

## 2023-10-30 PROCEDURE — 3074F SYST BP LT 130 MM HG: CPT | Performed by: NURSE PRACTITIONER

## 2023-10-30 RX ORDER — PRAVASTATIN SODIUM 20 MG
20 TABLET ORAL DAILY
COMMUNITY

## 2023-10-30 SDOH — ECONOMIC STABILITY: FOOD INSECURITY: WITHIN THE PAST 12 MONTHS, YOU WORRIED THAT YOUR FOOD WOULD RUN OUT BEFORE YOU GOT MONEY TO BUY MORE.: NEVER TRUE

## 2023-10-30 SDOH — ECONOMIC STABILITY: INCOME INSECURITY: HOW HARD IS IT FOR YOU TO PAY FOR THE VERY BASICS LIKE FOOD, HOUSING, MEDICAL CARE, AND HEATING?: NOT HARD AT ALL

## 2023-10-30 SDOH — ECONOMIC STABILITY: HOUSING INSECURITY
IN THE LAST 12 MONTHS, WAS THERE A TIME WHEN YOU DID NOT HAVE A STEADY PLACE TO SLEEP OR SLEPT IN A SHELTER (INCLUDING NOW)?: NO

## 2023-10-30 SDOH — ECONOMIC STABILITY: FOOD INSECURITY: WITHIN THE PAST 12 MONTHS, THE FOOD YOU BOUGHT JUST DIDN'T LAST AND YOU DIDN'T HAVE MONEY TO GET MORE.: NEVER TRUE

## 2023-10-30 ASSESSMENT — PATIENT HEALTH QUESTIONNAIRE - PHQ9
SUM OF ALL RESPONSES TO PHQ QUESTIONS 1-9: 0
1. LITTLE INTEREST OR PLEASURE IN DOING THINGS: 0
2. FEELING DOWN, DEPRESSED OR HOPELESS: 0
SUM OF ALL RESPONSES TO PHQ9 QUESTIONS 1 & 2: 0
SUM OF ALL RESPONSES TO PHQ QUESTIONS 1-9: 0

## 2023-11-02 ENCOUNTER — OFFICE VISIT (OUTPATIENT)
Age: 68
End: 2023-11-02
Payer: MEDICARE

## 2023-11-02 VITALS
HEART RATE: 70 BPM | DIASTOLIC BLOOD PRESSURE: 69 MMHG | WEIGHT: 288.4 LBS | HEIGHT: 70 IN | SYSTOLIC BLOOD PRESSURE: 126 MMHG | BODY MASS INDEX: 41.29 KG/M2

## 2023-11-02 DIAGNOSIS — E78.2 MIXED HYPERLIPIDEMIA: ICD-10-CM

## 2023-11-02 DIAGNOSIS — Z79.4 TYPE 2 DIABETES MELLITUS WITH DIABETIC POLYNEUROPATHY, WITH LONG-TERM CURRENT USE OF INSULIN (HCC): Primary | ICD-10-CM

## 2023-11-02 DIAGNOSIS — I10 ESSENTIAL (PRIMARY) HYPERTENSION: ICD-10-CM

## 2023-11-02 DIAGNOSIS — E11.42 TYPE 2 DIABETES MELLITUS WITH DIABETIC POLYNEUROPATHY, WITH LONG-TERM CURRENT USE OF INSULIN (HCC): Primary | ICD-10-CM

## 2023-11-02 LAB — HBA1C MFR BLD: 7.7 %

## 2023-11-02 PROCEDURE — 3052F HG A1C>EQUAL 8.0%<EQUAL 9.0%: CPT | Performed by: INTERNAL MEDICINE

## 2023-11-02 PROCEDURE — 1036F TOBACCO NON-USER: CPT | Performed by: INTERNAL MEDICINE

## 2023-11-02 PROCEDURE — 3017F COLORECTAL CA SCREEN DOC REV: CPT | Performed by: INTERNAL MEDICINE

## 2023-11-02 PROCEDURE — 3074F SYST BP LT 130 MM HG: CPT | Performed by: INTERNAL MEDICINE

## 2023-11-02 PROCEDURE — 83036 HEMOGLOBIN GLYCOSYLATED A1C: CPT | Performed by: INTERNAL MEDICINE

## 2023-11-02 PROCEDURE — G8417 CALC BMI ABV UP PARAM F/U: HCPCS | Performed by: INTERNAL MEDICINE

## 2023-11-02 PROCEDURE — 3078F DIAST BP <80 MM HG: CPT | Performed by: INTERNAL MEDICINE

## 2023-11-02 PROCEDURE — 1123F ACP DISCUSS/DSCN MKR DOCD: CPT | Performed by: INTERNAL MEDICINE

## 2023-11-02 PROCEDURE — G8427 DOCREV CUR MEDS BY ELIG CLIN: HCPCS | Performed by: INTERNAL MEDICINE

## 2023-11-02 PROCEDURE — G8484 FLU IMMUNIZE NO ADMIN: HCPCS | Performed by: INTERNAL MEDICINE

## 2023-11-02 PROCEDURE — 2022F DILAT RTA XM EVC RTNOPTHY: CPT | Performed by: INTERNAL MEDICINE

## 2023-11-02 PROCEDURE — 99214 OFFICE O/P EST MOD 30 MIN: CPT | Performed by: INTERNAL MEDICINE

## 2023-11-02 NOTE — PROGRESS NOTES
continue the Lantus 76 units BID, Humalog 48 units with each meal, plus 2:50 correction for BG >150, Actos 15mg daily and Mounaro 7.5mg every Thursday. Pt to test his BGs three times per day and send me BG readings in 4 weeks. We can adjust his Delle Skains as needed, if he is tolerating it and his BGs increase after the change. With his hx of neuropathy and amputations he would benefit from DM shoes and inserts. 2) HTN > His BP today was 126/69. Will not make any changes to his regimen at this time. 3) HLD > His lipid panel was at goal in February 2023. Pt to continue the Pravastatin 20mg daily. Pt voices understanding and agreement with the plan.     RTC 4 months      Copy sent to:  Delores Lagos

## 2023-12-08 RX ORDER — TIRZEPATIDE 7.5 MG/.5ML
INJECTION, SOLUTION SUBCUTANEOUS
Qty: 6 ML | Refills: 0 | Status: SHIPPED | OUTPATIENT
Start: 2023-12-08

## 2024-01-08 RX ORDER — ATORVASTATIN CALCIUM 20 MG/1
20 TABLET, FILM COATED ORAL DAILY
Qty: 90 TABLET | Refills: 3 | Status: SHIPPED | OUTPATIENT
Start: 2024-01-08

## 2024-01-23 RX ORDER — INSULIN GLARGINE 100 [IU]/ML
INJECTION, SOLUTION SUBCUTANEOUS
Qty: 150 ML | Refills: 3 | Status: SHIPPED | OUTPATIENT
Start: 2024-01-23

## 2024-01-23 RX ORDER — AMLODIPINE BESYLATE 5 MG/1
5 TABLET ORAL DAILY
Qty: 90 TABLET | Refills: 3 | Status: SHIPPED | OUTPATIENT
Start: 2024-01-23

## 2024-02-06 RX ORDER — TIRZEPATIDE 7.5 MG/.5ML
INJECTION, SOLUTION SUBCUTANEOUS
Qty: 6 ML | Refills: 1 | Status: SHIPPED | OUTPATIENT
Start: 2024-02-06

## 2024-02-13 RX ORDER — CARVEDILOL 12.5 MG/1
TABLET ORAL
Qty: 180 TABLET | Refills: 3 | Status: SHIPPED | OUTPATIENT
Start: 2024-02-13

## 2024-02-26 ENCOUNTER — OFFICE VISIT (OUTPATIENT)
Age: 69
End: 2024-02-26
Payer: MEDICARE

## 2024-02-26 VITALS
BODY MASS INDEX: 43.55 KG/M2 | DIASTOLIC BLOOD PRESSURE: 50 MMHG | RESPIRATION RATE: 18 BRPM | OXYGEN SATURATION: 95 % | HEART RATE: 60 BPM | SYSTOLIC BLOOD PRESSURE: 112 MMHG | WEIGHT: 294 LBS | HEIGHT: 69 IN | TEMPERATURE: 97 F

## 2024-02-26 DIAGNOSIS — R35.1 NOCTURIA: ICD-10-CM

## 2024-02-26 DIAGNOSIS — Z79.4 TYPE 2 DIABETES MELLITUS WITH DIABETIC POLYNEUROPATHY, WITH LONG-TERM CURRENT USE OF INSULIN (HCC): ICD-10-CM

## 2024-02-26 DIAGNOSIS — Z89.511 ACQUIRED ABSENCE OF RIGHT LEG BELOW KNEE (HCC): ICD-10-CM

## 2024-02-26 DIAGNOSIS — E66.01 OBESITY, CLASS III, BMI 40-49.9 (MORBID OBESITY) (HCC): ICD-10-CM

## 2024-02-26 DIAGNOSIS — R63.5 WEIGHT GAIN: ICD-10-CM

## 2024-02-26 DIAGNOSIS — E11.42 TYPE 2 DIABETES MELLITUS WITH DIABETIC POLYNEUROPATHY, WITH LONG-TERM CURRENT USE OF INSULIN (HCC): ICD-10-CM

## 2024-02-26 DIAGNOSIS — Z00.00 ENCOUNTER FOR MEDICARE ANNUAL WELLNESS EXAM: Primary | ICD-10-CM

## 2024-02-26 PROCEDURE — 3017F COLORECTAL CA SCREEN DOC REV: CPT | Performed by: NURSE PRACTITIONER

## 2024-02-26 PROCEDURE — 2022F DILAT RTA XM EVC RTNOPTHY: CPT | Performed by: NURSE PRACTITIONER

## 2024-02-26 PROCEDURE — G8484 FLU IMMUNIZE NO ADMIN: HCPCS | Performed by: NURSE PRACTITIONER

## 2024-02-26 PROCEDURE — 1036F TOBACCO NON-USER: CPT | Performed by: NURSE PRACTITIONER

## 2024-02-26 PROCEDURE — 1123F ACP DISCUSS/DSCN MKR DOCD: CPT | Performed by: NURSE PRACTITIONER

## 2024-02-26 PROCEDURE — G8427 DOCREV CUR MEDS BY ELIG CLIN: HCPCS | Performed by: NURSE PRACTITIONER

## 2024-02-26 PROCEDURE — 99214 OFFICE O/P EST MOD 30 MIN: CPT | Performed by: NURSE PRACTITIONER

## 2024-02-26 PROCEDURE — 3078F DIAST BP <80 MM HG: CPT | Performed by: NURSE PRACTITIONER

## 2024-02-26 PROCEDURE — G0439 PPPS, SUBSEQ VISIT: HCPCS | Performed by: NURSE PRACTITIONER

## 2024-02-26 PROCEDURE — G8417 CALC BMI ABV UP PARAM F/U: HCPCS | Performed by: NURSE PRACTITIONER

## 2024-02-26 PROCEDURE — 3074F SYST BP LT 130 MM HG: CPT | Performed by: NURSE PRACTITIONER

## 2024-02-26 PROCEDURE — 3046F HEMOGLOBIN A1C LEVEL >9.0%: CPT | Performed by: NURSE PRACTITIONER

## 2024-02-26 SDOH — ECONOMIC STABILITY: INCOME INSECURITY: IN THE LAST 12 MONTHS, WAS THERE A TIME WHEN YOU WERE NOT ABLE TO PAY THE MORTGAGE OR RENT ON TIME?: NO

## 2024-02-26 SDOH — HEALTH STABILITY: PHYSICAL HEALTH: ON AVERAGE, HOW MANY MINUTES DO YOU ENGAGE IN EXERCISE AT THIS LEVEL?: 0 MIN

## 2024-02-26 SDOH — HEALTH STABILITY: PHYSICAL HEALTH: ON AVERAGE, HOW MANY DAYS PER WEEK DO YOU ENGAGE IN MODERATE TO STRENUOUS EXERCISE (LIKE A BRISK WALK)?: 0 DAYS

## 2024-02-26 SDOH — ECONOMIC STABILITY: HOUSING INSECURITY: IN THE LAST 12 MONTHS, HOW MANY PLACES HAVE YOU LIVED?: 1

## 2024-02-26 ASSESSMENT — SOCIAL DETERMINANTS OF HEALTH (SDOH)
IN A TYPICAL WEEK, HOW MANY TIMES DO YOU TALK ON THE PHONE WITH FAMILY, FRIENDS, OR NEIGHBORS?: MORE THAN THREE TIMES A WEEK
DO YOU BELONG TO ANY CLUBS OR ORGANIZATIONS SUCH AS CHURCH GROUPS UNIONS, FRATERNAL OR ATHLETIC GROUPS, OR SCHOOL GROUPS?: NO
WITHIN THE LAST YEAR, HAVE YOU BEEN KICKED, HIT, SLAPPED, OR OTHERWISE PHYSICALLY HURT BY YOUR PARTNER OR EX-PARTNER?: NO
HOW OFTEN DO YOU ATTENT MEETINGS OF THE CLUB OR ORGANIZATION YOU BELONG TO?: NEVER
HOW OFTEN DO YOU ATTEND CHURCH OR RELIGIOUS SERVICES?: MORE THAN 4 TIMES PER YEAR
WITHIN THE LAST YEAR, HAVE TO BEEN RAPED OR FORCED TO HAVE ANY KIND OF SEXUAL ACTIVITY BY YOUR PARTNER OR EX-PARTNER?: NO
HOW OFTEN DO YOU GET TOGETHER WITH FRIENDS OR RELATIVES?: MORE THAN THREE TIMES A WEEK
WITHIN THE LAST YEAR, HAVE YOU BEEN AFRAID OF YOUR PARTNER OR EX-PARTNER?: NO
WITHIN THE LAST YEAR, HAVE YOU BEEN HUMILIATED OR EMOTIONALLY ABUSED IN OTHER WAYS BY YOUR PARTNER OR EX-PARTNER?: NO

## 2024-02-26 ASSESSMENT — LIFESTYLE VARIABLES: HOW OFTEN DO YOU HAVE A DRINK CONTAINING ALCOHOL: NEVER

## 2024-02-26 NOTE — PROGRESS NOTES
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  Weight went from 265 lbs from 2/22/23 to 294 lbs today.    Obesity, Class III, BMI 40-49.9 (morbid obesity) ([facility])  Discussed the patient's BMI with him.  The BMI follow up plan is as follows:     dietary management education, guidance, and counseling  encourage exercise  monitor weight  prescribed dietary intake  Type 2 diabetes mellitus with diabetic polyneuropathy, with long-term current use of insulin ([facility])  Assessment & Plan:   Well-controlled, continue current medications  Weight gain  There is a need for a right transtibial replacement socket .  Weight went from 265 lbs from 2/22/23 to 294 lbs today.   Encounter for Medicare annual wellness exam    Recommendations for Preventive Services Due: see orders and patient instructions/AVS.  Recommended screening schedule for the next 5-10 years is provided to the patient in written form: see Patient Instructions/AVS.     No follow-ups on file.     Subjective   The following acute and/or chronic problems were also addressed today:  Encounter for Medicare wellness; note attached.    Type 2 Diabetes followed by Dr. Benavidez endocrinologist appointment next week. Labs at [facility] tomorrow.     Acquired absence of right leg below the knee:  Assessment & Plan:  There is a need for a right transtibial replacement socket .  Weight went from 265 lbs from 2/22/23 to 294 lbs today.    Obesity, Class III, BMI 40-49.9 (morbid obesity) ([facility])  Discussed the patient's BMI with him.  The BMI follow up plan is as follows:     dietary management education, guidance, and counseling  encourage exercise  monitor weight  prescribed dietary intake  Type 2 diabetes mellitus with diabetic polyneuropathy, with long-term current use

## 2024-02-26 NOTE — PROGRESS NOTES
Chief Complaint   Patient presents with    Medicare AWV       Vitals:    02/26/24 0915   BP: (!) 112/50   Pulse: 60   Resp: 18   Temp: 97 °F (36.1 °C)   SpO2: 95%

## 2024-02-26 NOTE — PATIENT INSTRUCTIONS
Learning About Being Active as an Older Adult  Why is being active important as you get older?     Being active is one of the best things you can do for your health. And it's never too late to start. Being active--or getting active, if you aren't already--has definite benefits. It can:  Give you more energy,  Keep your mind sharp.  Improve balance to reduce your risk of falls.  Help you manage chronic illness with fewer medicines.  No matter how old you are, how fit you are, or what health problems you have, there is a form of activity that will work for you. And the more physical activity you can do, the better your overall health will be.  What kinds of activity can help you stay healthy?  Being more active will make your daily activities easier. Physical activity includes planned exercise and things you do in daily life. There are four types of activity:  Aerobic.  Doing aerobic activity makes your heart and lungs strong.  Includes walking, dancing, and gardening.  Aim for at least 2½ hours spread throughout the week.  It improves your energy and can help you sleep better.  Muscle-strengthening.  This type of activity can help maintain muscle and strengthen bones.  Includes climbing stairs, using resistance bands, and lifting or carrying heavy loads.  Aim for at least twice a week.  It can help protect the knees and other joints.  Stretching.  Stretching gives you better range of motion in joints and muscles.  Includes upper arm stretches, calf stretches, and gentle yoga.  Aim for at least twice a week, preferably after your muscles are warmed up from other activities.  It can help you function better in daily life.  Balancing.  This helps you stay coordinated and have good posture.  Includes heel-to-toe walking, fidelina chi, and certain types of yoga.  Aim for at least 3 days a week.  It can reduce your risk of falling.  Even if you have a hard time meeting the recommendations, it's better to be more active

## 2024-02-28 LAB
ALBUMIN SERPL-MCNC: 3.7 G/DL (ref 3.9–4.9)
ALBUMIN/GLOB SERPL: 1.2 {RATIO} (ref 1.2–2.2)
ALP SERPL-CCNC: 96 IU/L (ref 44–121)
ALT SERPL-CCNC: 22 IU/L (ref 0–44)
AST SERPL-CCNC: 19 IU/L (ref 0–40)
BILIRUB SERPL-MCNC: 0.4 MG/DL (ref 0–1.2)
BUN SERPL-MCNC: 9 MG/DL (ref 8–27)
BUN/CREAT SERPL: 10 (ref 10–24)
CALCIUM SERPL-MCNC: 9 MG/DL (ref 8.6–10.2)
CHLORIDE SERPL-SCNC: 105 MMOL/L (ref 96–106)
CHOLEST SERPL-MCNC: 104 MG/DL (ref 100–199)
CO2 SERPL-SCNC: 23 MMOL/L (ref 20–29)
CREAT SERPL-MCNC: 0.86 MG/DL (ref 0.76–1.27)
EGFRCR SERPLBLD CKD-EPI 2021: 93 ML/MIN/1.73
GLOBULIN SER CALC-MCNC: 3 G/DL (ref 1.5–4.5)
GLUCOSE SERPL-MCNC: 99 MG/DL (ref 70–99)
HBA1C MFR BLD: 7.2 % (ref 4.8–5.6)
HDLC SERPL-MCNC: 35 MG/DL
LDLC SERPL CALC-MCNC: 48 MG/DL (ref 0–99)
POTASSIUM SERPL-SCNC: 4 MMOL/L (ref 3.5–5.2)
PROT SERPL-MCNC: 6.7 G/DL (ref 6–8.5)
PSA SERPL-MCNC: 0.5 NG/ML (ref 0–4)
SODIUM SERPL-SCNC: 142 MMOL/L (ref 134–144)
TRIGL SERPL-MCNC: 114 MG/DL (ref 0–149)
VLDLC SERPL CALC-MCNC: 21 MG/DL (ref 5–40)

## 2024-02-29 LAB
ALBUMIN/CREAT UR: 19 MG/G CREAT (ref 0–29)
CREAT UR-MCNC: 182.4 MG/DL
IMP & REVIEW OF LAB RESULTS: NORMAL
Lab: NORMAL
MICROALBUMIN UR-MCNC: 35 UG/ML

## 2024-03-01 DIAGNOSIS — Z79.4 TYPE 2 DIABETES MELLITUS WITH DIABETIC POLYNEUROPATHY, WITH LONG-TERM CURRENT USE OF INSULIN (HCC): ICD-10-CM

## 2024-03-01 DIAGNOSIS — E11.42 TYPE 2 DIABETES MELLITUS WITH DIABETIC POLYNEUROPATHY, WITH LONG-TERM CURRENT USE OF INSULIN (HCC): ICD-10-CM

## 2024-03-04 ENCOUNTER — OFFICE VISIT (OUTPATIENT)
Age: 69
End: 2024-03-04
Payer: MEDICARE

## 2024-03-04 VITALS
SYSTOLIC BLOOD PRESSURE: 138 MMHG | BODY MASS INDEX: 44.35 KG/M2 | HEIGHT: 69 IN | DIASTOLIC BLOOD PRESSURE: 76 MMHG | HEART RATE: 63 BPM | WEIGHT: 299.4 LBS

## 2024-03-04 DIAGNOSIS — Z79.4 TYPE 2 DIABETES MELLITUS WITH DIABETIC POLYNEUROPATHY, WITH LONG-TERM CURRENT USE OF INSULIN (HCC): Primary | ICD-10-CM

## 2024-03-04 DIAGNOSIS — E11.42 TYPE 2 DIABETES MELLITUS WITH DIABETIC POLYNEUROPATHY, WITH LONG-TERM CURRENT USE OF INSULIN (HCC): Primary | ICD-10-CM

## 2024-03-04 DIAGNOSIS — E78.2 MIXED HYPERLIPIDEMIA: ICD-10-CM

## 2024-03-04 DIAGNOSIS — I10 ESSENTIAL (PRIMARY) HYPERTENSION: ICD-10-CM

## 2024-03-04 PROCEDURE — G2211 COMPLEX E/M VISIT ADD ON: HCPCS | Performed by: INTERNAL MEDICINE

## 2024-03-04 PROCEDURE — 3078F DIAST BP <80 MM HG: CPT | Performed by: INTERNAL MEDICINE

## 2024-03-04 PROCEDURE — 3017F COLORECTAL CA SCREEN DOC REV: CPT | Performed by: INTERNAL MEDICINE

## 2024-03-04 PROCEDURE — 99214 OFFICE O/P EST MOD 30 MIN: CPT | Performed by: INTERNAL MEDICINE

## 2024-03-04 PROCEDURE — G8417 CALC BMI ABV UP PARAM F/U: HCPCS | Performed by: INTERNAL MEDICINE

## 2024-03-04 PROCEDURE — 2022F DILAT RTA XM EVC RTNOPTHY: CPT | Performed by: INTERNAL MEDICINE

## 2024-03-04 PROCEDURE — 3075F SYST BP GE 130 - 139MM HG: CPT | Performed by: INTERNAL MEDICINE

## 2024-03-04 PROCEDURE — G8484 FLU IMMUNIZE NO ADMIN: HCPCS | Performed by: INTERNAL MEDICINE

## 2024-03-04 PROCEDURE — 1123F ACP DISCUSS/DSCN MKR DOCD: CPT | Performed by: INTERNAL MEDICINE

## 2024-03-04 PROCEDURE — G8427 DOCREV CUR MEDS BY ELIG CLIN: HCPCS | Performed by: INTERNAL MEDICINE

## 2024-03-04 PROCEDURE — 3051F HG A1C>EQUAL 7.0%<8.0%: CPT | Performed by: INTERNAL MEDICINE

## 2024-03-04 PROCEDURE — 1036F TOBACCO NON-USER: CPT | Performed by: INTERNAL MEDICINE

## 2024-03-04 NOTE — PROGRESS NOTES
Chief Complaint   Patient presents with    Diabetes     Pcp and pharmacy verified     History of Present Illness: Rob Denney is a 68 y.o. male here for follow up of diabetes.  He was diagnosed with diabetes 1993 .      At our visit in June 2023 his A1C was 9.4%. I started him on Actos 15mg daily and I increased his Mounjaro to 5mg weekly.  Since that time he has been sending my his BG readings every 3-4 weeks and we have been adjusting his doses of insulin and the Mounjaro and his BG has been improving.    \"I have been working with the pain specialist at [facility] and they have been trying some stuff with my back to help my pain. It is not helping much.    Pt denies any recent illnesses, injuries or hospitalizations.    Pt is taking Lantus 76 units BID, Humalog 48 units with each meal, plus 2:50 correction for BG >150, Actos 15mg daily. \"I have not taken any Mounjaro since February 2024, the pharmacy wanted $1100 and I could not afford it.     He is testing his BG twice per day day. His FBG have been running in the 130-190s with pre-dinner in the 70-180s.  He has not had issues of hypoglycemia.    His A1C last week was down to 7.2%.     - Pt wakes around 8AM. He will test his sugar when he gets up and take his Lantus and Humalog at that time.  - He has breakfast around 830AM, yesterday he had watermelon, yogurt and coffee (creamer).        - He reports he is not snacking during the day.   - He is not eating lunch typically.     - He \"is trying to stay away from snacking in the afternoon.\".  - He has dinner around 530PM, last night he had meatloaf, rice and water.       - He reports he is not snacking in the evening.     Exercise consists of \"not much.     Pt is s/p right BKA in March 2018. \"I had a diabetic infection that went south\".  He saw Dr. Rea \"my stump doctor\" in October 2023 \"He said everything was looking good. I will see him again in April 2024.\"     No hx of MI or CVA.    Pt has hx of neuropathy \"but it

## 2024-04-08 ENCOUNTER — TELEPHONE (OUTPATIENT)
Age: 69
End: 2024-04-08

## 2024-04-08 RX ORDER — BLOOD SUGAR DIAGNOSTIC
STRIP MISCELLANEOUS
Qty: 200 STRIP | Refills: 1 | Status: SHIPPED | OUTPATIENT
Start: 2024-04-08

## 2024-04-08 NOTE — TELEPHONE ENCOUNTER
Medication Refill Request    Rob PEDRO Denney is requesting a refill of the following medication(s):   ACCU-CHEK GUIDE strip   Please send refill to:       [id number] - [place], [place] - [address] - P [phone] - F [phone] [id number] [place] [address]  Phone: [phone] Fax: [phone]

## 2024-05-26 RX ORDER — INSULIN LISPRO 100 [IU]/ML
INJECTION, SOLUTION INTRAVENOUS; SUBCUTANEOUS
Qty: 135 ML | Refills: 3 | Status: SHIPPED | OUTPATIENT
Start: 2024-05-26

## 2024-05-26 RX ORDER — PIOGLITAZONEHYDROCHLORIDE 15 MG/1
15 TABLET ORAL DAILY
Qty: 90 TABLET | Refills: 3 | Status: SHIPPED | OUTPATIENT
Start: 2024-05-26

## 2024-06-26 ENCOUNTER — HOSPITAL ENCOUNTER (OUTPATIENT)
Facility: HOSPITAL | Age: 69
Setting detail: SPECIMEN
Discharge: HOME | End: 2024-06-29

## 2024-06-26 ENCOUNTER — TELEPHONE (OUTPATIENT)
Age: 69
End: 2024-06-26

## 2024-06-26 ENCOUNTER — OFFICE VISIT (OUTPATIENT)
Age: 69
End: 2024-06-26
Payer: MEDICARE

## 2024-06-26 VITALS
BODY MASS INDEX: 45.18 KG/M2 | SYSTOLIC BLOOD PRESSURE: 108 MMHG | HEIGHT: 69 IN | WEIGHT: 305 LBS | TEMPERATURE: 96.8 F | HEART RATE: 73 BPM | RESPIRATION RATE: 30 BRPM | DIASTOLIC BLOOD PRESSURE: 50 MMHG | OXYGEN SATURATION: 98 %

## 2024-06-26 DIAGNOSIS — D22.9 FLESHY SKIN MOLE: ICD-10-CM

## 2024-06-26 DIAGNOSIS — E11.42 TYPE 2 DIABETES MELLITUS WITH DIABETIC POLYNEUROPATHY, WITH LONG-TERM CURRENT USE OF INSULIN (HCC): ICD-10-CM

## 2024-06-26 DIAGNOSIS — Z79.4 TYPE 2 DIABETES MELLITUS WITH DIABETIC POLYNEUROPATHY, WITH LONG-TERM CURRENT USE OF INSULIN (HCC): ICD-10-CM

## 2024-06-26 DIAGNOSIS — Z76.89 ENCOUNTER FOR WEIGHT MANAGEMENT: Primary | ICD-10-CM

## 2024-06-26 PROCEDURE — 1123F ACP DISCUSS/DSCN MKR DOCD: CPT | Performed by: NURSE PRACTITIONER

## 2024-06-26 PROCEDURE — 3074F SYST BP LT 130 MM HG: CPT | Performed by: NURSE PRACTITIONER

## 2024-06-26 PROCEDURE — 3017F COLORECTAL CA SCREEN DOC REV: CPT | Performed by: NURSE PRACTITIONER

## 2024-06-26 PROCEDURE — 11312 SHAVE SKIN LESION 1.1-2.0 CM: CPT | Performed by: NURSE PRACTITIONER

## 2024-06-26 PROCEDURE — G8427 DOCREV CUR MEDS BY ELIG CLIN: HCPCS | Performed by: NURSE PRACTITIONER

## 2024-06-26 PROCEDURE — 99214 OFFICE O/P EST MOD 30 MIN: CPT | Performed by: NURSE PRACTITIONER

## 2024-06-26 PROCEDURE — 3051F HG A1C>EQUAL 7.0%<8.0%: CPT | Performed by: NURSE PRACTITIONER

## 2024-06-26 PROCEDURE — 1036F TOBACCO NON-USER: CPT | Performed by: NURSE PRACTITIONER

## 2024-06-26 PROCEDURE — 3078F DIAST BP <80 MM HG: CPT | Performed by: NURSE PRACTITIONER

## 2024-06-26 PROCEDURE — G8417 CALC BMI ABV UP PARAM F/U: HCPCS | Performed by: NURSE PRACTITIONER

## 2024-06-26 PROCEDURE — 2022F DILAT RTA XM EVC RTNOPTHY: CPT | Performed by: NURSE PRACTITIONER

## 2024-06-26 RX ORDER — LIDOCAINE HYDROCHLORIDE 10; .005 MG/ML; MG/ML
10 INJECTION, SOLUTION EPIDURAL; INFILTRATION; INTRACAUDAL; PERINEURAL ONCE
Qty: 10 ML | Refills: 0
Start: 2024-06-26 | End: 2024-06-26

## 2024-06-26 ASSESSMENT — PATIENT HEALTH QUESTIONNAIRE - PHQ9
SUM OF ALL RESPONSES TO PHQ9 QUESTIONS 1 & 2: 0
SUM OF ALL RESPONSES TO PHQ QUESTIONS 1-9: 0
2. FEELING DOWN, DEPRESSED OR HOPELESS: NOT AT ALL
SUM OF ALL RESPONSES TO PHQ QUESTIONS 1-9: 0
1. LITTLE INTEREST OR PLEASURE IN DOING THINGS: NOT AT ALL

## 2024-06-26 NOTE — TELEPHONE ENCOUNTER
EMIR pt, he SW lots of people with Medicare, do not cover wt loss medications, EMIR [facility] told the same thing.  I did inform him, not sure if she sent one through to pharmacy, will probably need a PA, cannot confirm of approval, OK of understanding and thanks.  He does not care which one Kandi chooses to send.

## 2024-06-26 NOTE — PROGRESS NOTES
Subjective:     Rob Denney is a 68 y.o. 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 Blood pressure low today.    Weight management :  Mr. Denney endorses he is followed by pain management Dr. Rajinder Vivar He recommends that Mr. Denney loose weight to help with his back pain.  Rob Denney continues diet  for weight loss; weight today is 305 pounds which is his heaviest weight   Continues to follow a lower calorie diet and exercise is limited due to back pain and weight.   Breakfast Quiche  He usually skips lunch   Dinner Salad and a sand which.   He usually drinks 6 bottles of green tea 16 ounces -96 ounces per day.     Diabetes.  Sugars controlled moderately  Hypoglycemia: none  Tolerating current treatment well  Current medications include     HUMALOG KWIKPEN 100 UNIT/ML SOPN, INJECT SUBCUTANEOUSLY 38 UNITS  WITH BREAKFAST, 38 UNITS WITH  LUNCH, 38 UNITS WITH DINNER,  PLUS CORRECTION. MAX DAILY DOSE  150 UNITS., Disp: 135 mL, Rfl: 3    pioglitazone (ACTOS) 15 MG tablet, TAKE 1 TABLET BY MOUTH DAILY, Disp: 90 tablet, Rfl: 3  Lab Results   Component Value Date/Time    LDL 48 02/27/2024 10:28 AM     No results found for: \"MCA2\", \"MCAU2\"    Last eye exam performed  less than 1 year 12/15/2023 ago and was abnormal: wears corrective lenses.    Last foot exam 03/04/2024 performed less than 1 year ago.  warm, good capillary refill and right BKA     depression screening addressed not at risk    abuse screening addressed denies    learning assessment addressed reviewed

## 2024-06-26 NOTE — TELEPHONE ENCOUNTER
----- Message from Marco Portillo sent at 6/26/2024 10:22 AM EDT -----  Regarding: ECC Message to Provider  ECC Message to Provider    Relationship to Patient: Self     Additional Information : Patient is already balled the medicare and the medicare told to him that  Kandi Casey, KIMBERLY Cuevas NP that need to call medicare regarding to weight loss medication.  --------------------------------------------------------------------------------------------------------------------------    Call Back Information: OK to leave message on voicemail  Preferred Call Back Number: Phone [phone]

## 2024-06-26 NOTE — PROGRESS NOTES
\"Have you been to the ER, urgent care clinic since your last visit?  Hospitalized since your last visit?\"    NO    “Have you seen or consulted any other health care providers outside of [facility] since your last visit?”      Yes pain management Rajinder Prince        “Have you had a colorectal cancer screening such as a colonoscopy/FIT/Cologuard?        Date of last Colonoscopy: 6/9/2014  No cologuard on file  No FIT/FOBT on file   No flexible sigmoidoscopy on file         Click Here for Release of Records Request      Chief Complaint   Patient presents with    Weight Management    Lesion(s)     Left leg         Vitals:    06/26/24 0841   BP: (!) 108/50   Pulse: 73   Resp: 30   Temp: 96.8 °F (36 °C)   SpO2: 98%

## 2024-07-03 LAB — HEMOCCULT STL QL IA: POSITIVE

## 2024-07-10 ENCOUNTER — TELEPHONE (OUTPATIENT)
Age: 69
End: 2024-07-10

## 2024-07-10 NOTE — TELEPHONE ENCOUNTER
Dr. Ortega's office called stating that they would be unable to take pt for colonoscopy due to elevated BMI. Pt will need to be referred elsewhere.

## 2024-09-03 RX ORDER — FLURBIPROFEN SODIUM 0.3 MG/ML
SOLUTION/ DROPS OPHTHALMIC
Qty: 450 EACH | Refills: 1 | Status: SHIPPED | OUTPATIENT
Start: 2024-09-03

## 2024-09-04 ENCOUNTER — OFFICE VISIT (OUTPATIENT)
Age: 69
End: 2024-09-04
Payer: MEDICARE

## 2024-09-04 VITALS
BODY MASS INDEX: 46.65 KG/M2 | DIASTOLIC BLOOD PRESSURE: 65 MMHG | WEIGHT: 315 LBS | SYSTOLIC BLOOD PRESSURE: 135 MMHG | HEART RATE: 57 BPM | HEIGHT: 69 IN

## 2024-09-04 DIAGNOSIS — E78.2 MIXED HYPERLIPIDEMIA: ICD-10-CM

## 2024-09-04 DIAGNOSIS — Z79.4 TYPE 2 DIABETES MELLITUS WITH DIABETIC POLYNEUROPATHY, WITH LONG-TERM CURRENT USE OF INSULIN (HCC): Primary | ICD-10-CM

## 2024-09-04 DIAGNOSIS — E11.42 TYPE 2 DIABETES MELLITUS WITH DIABETIC POLYNEUROPATHY, WITH LONG-TERM CURRENT USE OF INSULIN (HCC): Primary | ICD-10-CM

## 2024-09-04 DIAGNOSIS — I10 ESSENTIAL (PRIMARY) HYPERTENSION: ICD-10-CM

## 2024-09-04 LAB — HBA1C MFR BLD: 7.5 %

## 2024-09-04 PROCEDURE — G8417 CALC BMI ABV UP PARAM F/U: HCPCS | Performed by: INTERNAL MEDICINE

## 2024-09-04 PROCEDURE — 3051F HG A1C>EQUAL 7.0%<8.0%: CPT | Performed by: INTERNAL MEDICINE

## 2024-09-04 PROCEDURE — 99214 OFFICE O/P EST MOD 30 MIN: CPT | Performed by: INTERNAL MEDICINE

## 2024-09-04 PROCEDURE — G2211 COMPLEX E/M VISIT ADD ON: HCPCS | Performed by: INTERNAL MEDICINE

## 2024-09-04 PROCEDURE — 2022F DILAT RTA XM EVC RTNOPTHY: CPT | Performed by: INTERNAL MEDICINE

## 2024-09-04 PROCEDURE — 1123F ACP DISCUSS/DSCN MKR DOCD: CPT | Performed by: INTERNAL MEDICINE

## 2024-09-04 PROCEDURE — 1036F TOBACCO NON-USER: CPT | Performed by: INTERNAL MEDICINE

## 2024-09-04 PROCEDURE — 3017F COLORECTAL CA SCREEN DOC REV: CPT | Performed by: INTERNAL MEDICINE

## 2024-09-04 PROCEDURE — 3078F DIAST BP <80 MM HG: CPT | Performed by: INTERNAL MEDICINE

## 2024-09-04 PROCEDURE — G8427 DOCREV CUR MEDS BY ELIG CLIN: HCPCS | Performed by: INTERNAL MEDICINE

## 2024-09-04 PROCEDURE — 3075F SYST BP GE 130 - 139MM HG: CPT | Performed by: INTERNAL MEDICINE

## 2024-09-04 PROCEDURE — 83036 HEMOGLOBIN GLYCOSYLATED A1C: CPT | Performed by: INTERNAL MEDICINE

## 2024-09-04 RX ORDER — FLURBIPROFEN SODIUM 0.3 MG/ML
SOLUTION/ DROPS OPHTHALMIC
Qty: 450 EACH | Refills: 1 | OUTPATIENT
Start: 2024-09-04

## 2024-09-04 NOTE — PROGRESS NOTES
Chief Complaint   Patient presents with    Diabetes     Pcp and pharmacy verified     History of Present Illness: Rob Denney is a 69 y.o. male here for follow up of diabetes.  He was diagnosed with diabetes 1993 .      \"I have been working with the pain specialist at [facility] and they have been trying some stuff with my back to help my pain. I am in PT for my back and it seems to be helping.\"    Pt denies any recent illnesses, injuries or hospitalizations.    Pt is taking Lantus 76 units BID, Humalog 48 units with breakfast and dinner only (he is not eating lunch), plus 2:50 correction for BG >150, Actos 15mg daily.  He is not taking any GLP-1s due to cost.  \"My PCP started me on a twice per day weight loss medication called Cilantra\". He notes it does seem to help with his appetite.    He is testing his BG twice per day day. His FBG have been running in the 70-130s with pre-dinner in the 70-180s.  He has not had issues of hypoglycemia.    His A1C today was 7.5%.     - Pt wakes around 8AM. He will test his sugar when he gets up and take his Lantus and Humalog at that time.  - He has breakfast around 830AM, yesterday he had quiche and water.         - He reports he is not snacking during the day.   - He is not eating lunch typically.  He only takes the Humalog when he eats.  - He \"is trying to stay away from snacking in the afternoon.\".  - He has dinner around 530PM, last night he had hot wings and iced tea.        - He reports he is not snacking in the evening.     Exercise consists of \"not much.     Pt is s/p right BKA in March 2018. \"I had a diabetic infection that went south\".  He saw Dr. Rea \"my stump doctor\" in April 2024 \"He left [facility] and is starting a private practice, so I need to find a new stump doctor\".     No hx of MI or CVA.    Pt has hx of neuropathy \"but it is not bad\" and he is not taking any agents for this.    Pt denies hx of nephropathy.    Last eye exam was in May 2024. \"they said I had some

## 2024-09-11 ENCOUNTER — OFFICE VISIT (OUTPATIENT)
Age: 69
End: 2024-09-11
Payer: MEDICARE

## 2024-09-11 VITALS
WEIGHT: 313 LBS | DIASTOLIC BLOOD PRESSURE: 62 MMHG | HEIGHT: 69 IN | OXYGEN SATURATION: 95 % | SYSTOLIC BLOOD PRESSURE: 130 MMHG | TEMPERATURE: 97.9 F | RESPIRATION RATE: 18 BRPM | HEART RATE: 62 BPM | BODY MASS INDEX: 46.36 KG/M2

## 2024-09-11 DIAGNOSIS — Z79.4 TYPE 2 DIABETES MELLITUS WITH DIABETIC POLYNEUROPATHY, WITH LONG-TERM CURRENT USE OF INSULIN (HCC): Primary | ICD-10-CM

## 2024-09-11 DIAGNOSIS — I10 ESSENTIAL HYPERTENSION: ICD-10-CM

## 2024-09-11 DIAGNOSIS — Z89.511 ACQUIRED ABSENCE OF RIGHT LEG BELOW KNEE (HCC): ICD-10-CM

## 2024-09-11 DIAGNOSIS — E78.49 OTHER HYPERLIPIDEMIA: ICD-10-CM

## 2024-09-11 DIAGNOSIS — E11.42 TYPE 2 DIABETES MELLITUS WITH DIABETIC POLYNEUROPATHY, WITH LONG-TERM CURRENT USE OF INSULIN (HCC): Primary | ICD-10-CM

## 2024-09-11 PROCEDURE — 3075F SYST BP GE 130 - 139MM HG: CPT | Performed by: NURSE PRACTITIONER

## 2024-09-11 PROCEDURE — 99214 OFFICE O/P EST MOD 30 MIN: CPT | Performed by: NURSE PRACTITIONER

## 2024-09-11 PROCEDURE — G8427 DOCREV CUR MEDS BY ELIG CLIN: HCPCS | Performed by: NURSE PRACTITIONER

## 2024-09-11 PROCEDURE — G8417 CALC BMI ABV UP PARAM F/U: HCPCS | Performed by: NURSE PRACTITIONER

## 2024-09-11 PROCEDURE — 1036F TOBACCO NON-USER: CPT | Performed by: NURSE PRACTITIONER

## 2024-09-11 PROCEDURE — 3078F DIAST BP <80 MM HG: CPT | Performed by: NURSE PRACTITIONER

## 2024-09-11 PROCEDURE — 1123F ACP DISCUSS/DSCN MKR DOCD: CPT | Performed by: NURSE PRACTITIONER

## 2024-09-11 PROCEDURE — 2022F DILAT RTA XM EVC RTNOPTHY: CPT | Performed by: NURSE PRACTITIONER

## 2024-09-11 PROCEDURE — 3051F HG A1C>EQUAL 7.0%<8.0%: CPT | Performed by: NURSE PRACTITIONER

## 2024-09-11 PROCEDURE — 3017F COLORECTAL CA SCREEN DOC REV: CPT | Performed by: NURSE PRACTITIONER

## 2024-09-11 ASSESSMENT — PATIENT HEALTH QUESTIONNAIRE - PHQ9
1. LITTLE INTEREST OR PLEASURE IN DOING THINGS: NOT AT ALL
SUM OF ALL RESPONSES TO PHQ QUESTIONS 1-9: 0

## 2024-09-13 RX ORDER — INSULIN LISPRO 100 [IU]/ML
INJECTION, SOLUTION INTRAVENOUS; SUBCUTANEOUS
Qty: 135 ML | Refills: 3
Start: 2024-09-13

## 2024-10-08 ENCOUNTER — TELEPHONE (OUTPATIENT)
Age: 69
End: 2024-10-08

## 2024-10-08 NOTE — TELEPHONE ENCOUNTER
Verbal order given for PT OT and personnel aide for care following Hospital stay. S/w Saniya from [facility] phone # [phone]

## 2024-10-14 ENCOUNTER — TELEPHONE (OUTPATIENT)
Age: 69
End: 2024-10-14

## 2024-10-14 NOTE — TELEPHONE ENCOUNTER
Rosa Isela saw Rob this morning about 10 am. PT had just finished with him. She noticed that he was pale and weak, sweaty, and dizzy. He took 76 of his Lantus and 46 of Humalog. He had eaten watermelon and his sugar was 36. Rob's sugar this am was 103 fasting. He ate a couple of donuts and started getting his color back and feeling better. He took his sugar at 12:40 pm and it was 113. Rosa Isela knows he isn't eating right and the house if full of carbs. She has encouraged him to make better food choices. She wanted to let Kandi know what was going on and to see if she felt Rob was on the correct  dosage of insulin. Kandi can call Rosa Isela if needed.

## 2024-10-16 NOTE — TELEPHONE ENCOUNTER
SW Mr. Denney, informed of Kandi's message advice, stated he don't remember missing any appointment, but will take  the appointment.  He was transferred to [facility] to schedule.

## 2024-10-23 ENCOUNTER — OFFICE VISIT (OUTPATIENT)
Age: 69
End: 2024-10-23
Payer: MEDICARE

## 2024-10-23 VITALS
TEMPERATURE: 97.2 F | BODY MASS INDEX: 46.22 KG/M2 | SYSTOLIC BLOOD PRESSURE: 110 MMHG | OXYGEN SATURATION: 94 % | HEIGHT: 69 IN | HEART RATE: 61 BPM | DIASTOLIC BLOOD PRESSURE: 60 MMHG

## 2024-10-23 DIAGNOSIS — E11.65 INADEQUATELY CONTROLLED DIABETES MELLITUS (HCC): Primary | ICD-10-CM

## 2024-10-23 DIAGNOSIS — Z79.4 TYPE 2 DIABETES MELLITUS WITH DIABETIC POLYNEUROPATHY, WITH LONG-TERM CURRENT USE OF INSULIN (HCC): ICD-10-CM

## 2024-10-23 DIAGNOSIS — I10 ESSENTIAL HYPERTENSION: ICD-10-CM

## 2024-10-23 DIAGNOSIS — E11.42 TYPE 2 DIABETES MELLITUS WITH DIABETIC POLYNEUROPATHY, WITH LONG-TERM CURRENT USE OF INSULIN (HCC): ICD-10-CM

## 2024-10-23 DIAGNOSIS — Z09 HOSPITAL DISCHARGE FOLLOW-UP: ICD-10-CM

## 2024-10-23 PROCEDURE — 1036F TOBACCO NON-USER: CPT | Performed by: NURSE PRACTITIONER

## 2024-10-23 PROCEDURE — 1126F AMNT PAIN NOTED NONE PRSNT: CPT | Performed by: NURSE PRACTITIONER

## 2024-10-23 PROCEDURE — 1111F DSCHRG MED/CURRENT MED MERGE: CPT | Performed by: NURSE PRACTITIONER

## 2024-10-23 PROCEDURE — 3074F SYST BP LT 130 MM HG: CPT | Performed by: NURSE PRACTITIONER

## 2024-10-23 PROCEDURE — 1160F RVW MEDS BY RX/DR IN RCRD: CPT | Performed by: NURSE PRACTITIONER

## 2024-10-23 PROCEDURE — 3017F COLORECTAL CA SCREEN DOC REV: CPT | Performed by: NURSE PRACTITIONER

## 2024-10-23 PROCEDURE — 2022F DILAT RTA XM EVC RTNOPTHY: CPT | Performed by: NURSE PRACTITIONER

## 2024-10-23 PROCEDURE — 3051F HG A1C>EQUAL 7.0%<8.0%: CPT | Performed by: NURSE PRACTITIONER

## 2024-10-23 PROCEDURE — G8417 CALC BMI ABV UP PARAM F/U: HCPCS | Performed by: NURSE PRACTITIONER

## 2024-10-23 PROCEDURE — 1123F ACP DISCUSS/DSCN MKR DOCD: CPT | Performed by: NURSE PRACTITIONER

## 2024-10-23 PROCEDURE — G8427 DOCREV CUR MEDS BY ELIG CLIN: HCPCS | Performed by: NURSE PRACTITIONER

## 2024-10-23 PROCEDURE — 1159F MED LIST DOCD IN RCRD: CPT | Performed by: NURSE PRACTITIONER

## 2024-10-23 PROCEDURE — G8484 FLU IMMUNIZE NO ADMIN: HCPCS | Performed by: NURSE PRACTITIONER

## 2024-10-23 PROCEDURE — 99214 OFFICE O/P EST MOD 30 MIN: CPT | Performed by: NURSE PRACTITIONER

## 2024-10-23 PROCEDURE — 3078F DIAST BP <80 MM HG: CPT | Performed by: NURSE PRACTITIONER

## 2024-10-23 ASSESSMENT — PATIENT HEALTH QUESTIONNAIRE - PHQ9
2. FEELING DOWN, DEPRESSED OR HOPELESS: NOT AT ALL
SUM OF ALL RESPONSES TO PHQ QUESTIONS 1-9: 0

## 2024-10-23 NOTE — PROGRESS NOTES
\"Have you been to the ER, urgent care clinic since your last visit?  Hospitalized since your last visit?\"     Yes Yvonne [facility] 9- toe amputation    “Have you seen or consulted any other health care providers outside of [facility] since your last visit?”    NO            Click Here for Release of Records Request      Chief Complaint   Patient presents with    Diabetes    Follow-Up from Hospital     Left foot toe amputation         Vitals:    10/23/24 1121   BP: 110/60   Pulse: 61   Temp: 97.2 °F (36.2 °C)   SpO2: 94%

## 2024-10-24 RX ORDER — ACYCLOVIR 400 MG/1
TABLET ORAL
Qty: 1 EACH | Refills: 0 | Status: SHIPPED | OUTPATIENT
Start: 2024-10-24

## 2024-10-24 RX ORDER — ACYCLOVIR 400 MG/1
TABLET ORAL
Status: CANCELLED | OUTPATIENT
Start: 2024-10-24

## 2024-10-24 RX ORDER — INSULIN GLARGINE 100 [IU]/ML
INJECTION, SOLUTION SUBCUTANEOUS
Qty: 5 ADJUSTABLE DOSE PRE-FILLED PEN SYRINGE | Refills: 5 | Status: SHIPPED | OUTPATIENT
Start: 2024-10-24

## 2024-10-24 RX ORDER — ACYCLOVIR 400 MG/1
TABLET ORAL
Qty: 3 EACH | Refills: 12 | Status: SHIPPED | OUTPATIENT
Start: 2024-10-24

## 2024-10-24 RX ORDER — ACYCLOVIR 400 MG/1
TABLET ORAL
Qty: 1 EACH | Refills: 0 | Status: CANCELLED | OUTPATIENT
Start: 2024-10-24

## 2024-10-24 NOTE — PROGRESS NOTES
Subjective:     Rob Denney is a 69 y.o. 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  Qty: 70 tablet, Refills: 0     Associated Diagnoses: Diabetic ulcer of left foot associated with type 2 diabetes mellitus, with necrosis of muscle, unspecified part of foot (CMS/HCC); Osteomyelitis of ankle or foot, acute, left (CMS/[facility])                  CONTINUE these medications which have NOT CHANGED     Details   amLODIPine (Norvasc) 5 MG tablet Take 5 mg by mouth daily.       aspirin 81 MG EC tablet Take 81 mg by mouth daily.       atorvastatin (Lipitor) 20 MG tablet Take 20 mg by mouth daily.       carvedilol (Coreg) 12.5 MG tablet Take 12.5 mg by mouth 2 times daily.       insulin lispro (HumaLOG- 1 unit dial) 100 UNIT/ML injection pen Inject 48 Units under the skin 2 times daily with meals.       Lantus SoloStar 100 UNIT/ML injection pen Inject 76 Units under the skin 2 times daily.       Multiple Vitamin (Daily Vites) tablet Take 1 tablet by mouth daily.                 Primary Discharge Diagnosis:  Diabetic foot infection of left leg      Outpatient Follow-Up:         Future Appointments   Date Time Provider Department

## 2024-10-28 ENCOUNTER — TELEPHONE (OUTPATIENT)
Age: 69
End: 2024-10-28

## 2024-10-28 NOTE — TELEPHONE ENCOUNTER
----- Message from Karl WOO sent at 10/28/2024  1:31 PM EDT -----  Regarding: ECC Referral Request  ECC Referral Request    Reason for referral request: Lab/Test Order    Specialist/Lab/Test patient is requesting (if known):Colonoscopy     Specialist Phone Number (if applicable):N/A     Additional Information: Patient need a referral for a colonoscopy   --------------------------------------------------------------------------------------------------------------------------    Relationship to Patient: Self     Call Back Information: OK to leave message on voicemail  Preferred Call Back Number: Phone

## 2024-10-30 ENCOUNTER — TELEPHONE (OUTPATIENT)
Age: 69
End: 2024-10-30

## 2024-10-30 NOTE — TELEPHONE ENCOUNTER
Pt called and wants to know how will his dexcom get to him does it get delivered or does he get it from the pharmacy?  ( I dont see where one was ordered )  states viktor said at 10-23 visit she was ordering it . Please advise

## 2024-10-30 NOTE — TELEPHONE ENCOUNTER
S/w [facility] his insurance does not cover Dexcom. They said he would have to call his insurance o see what CGM devise they cover, patient informed.

## 2024-12-09 ENCOUNTER — TELEPHONE (OUTPATIENT)
Age: 69
End: 2024-12-09

## 2024-12-09 NOTE — TELEPHONE ENCOUNTER
Rosa Isela wanted to let Kandi know they are going to extend there certification period until Feb 4th due to his foot not healing properly.

## 2024-12-31 ENCOUNTER — PREP FOR PROCEDURE (OUTPATIENT)
Age: 69
End: 2024-12-31

## 2024-12-31 ENCOUNTER — OFFICE VISIT (OUTPATIENT)
Age: 69
End: 2024-12-31
Payer: MEDICARE

## 2024-12-31 VITALS
RESPIRATION RATE: 20 BRPM | TEMPERATURE: 97.6 F | WEIGHT: 275 LBS | HEIGHT: 69 IN | BODY MASS INDEX: 40.73 KG/M2 | HEART RATE: 60 BPM | OXYGEN SATURATION: 95 % | DIASTOLIC BLOOD PRESSURE: 73 MMHG | SYSTOLIC BLOOD PRESSURE: 143 MMHG

## 2024-12-31 DIAGNOSIS — E11.42 TYPE 2 DIABETES MELLITUS WITH DIABETIC POLYNEUROPATHY, WITH LONG-TERM CURRENT USE OF INSULIN (HCC): ICD-10-CM

## 2024-12-31 DIAGNOSIS — R19.5 POSITIVE FIT (FECAL IMMUNOCHEMICAL TEST): ICD-10-CM

## 2024-12-31 DIAGNOSIS — Z79.4 TYPE 2 DIABETES MELLITUS WITH DIABETIC POLYNEUROPATHY, WITH LONG-TERM CURRENT USE OF INSULIN (HCC): ICD-10-CM

## 2024-12-31 DIAGNOSIS — R19.5 POSITIVE FIT (FECAL IMMUNOCHEMICAL TEST): Primary | ICD-10-CM

## 2024-12-31 PROCEDURE — G8484 FLU IMMUNIZE NO ADMIN: HCPCS | Performed by: SURGERY

## 2024-12-31 PROCEDURE — 1123F ACP DISCUSS/DSCN MKR DOCD: CPT | Performed by: SURGERY

## 2024-12-31 PROCEDURE — 3078F DIAST BP <80 MM HG: CPT | Performed by: SURGERY

## 2024-12-31 PROCEDURE — 1160F RVW MEDS BY RX/DR IN RCRD: CPT | Performed by: SURGERY

## 2024-12-31 PROCEDURE — 1036F TOBACCO NON-USER: CPT | Performed by: SURGERY

## 2024-12-31 PROCEDURE — 3017F COLORECTAL CA SCREEN DOC REV: CPT | Performed by: SURGERY

## 2024-12-31 PROCEDURE — 2022F DILAT RTA XM EVC RTNOPTHY: CPT | Performed by: SURGERY

## 2024-12-31 PROCEDURE — G8427 DOCREV CUR MEDS BY ELIG CLIN: HCPCS | Performed by: SURGERY

## 2024-12-31 PROCEDURE — 1126F AMNT PAIN NOTED NONE PRSNT: CPT | Performed by: SURGERY

## 2024-12-31 PROCEDURE — 1159F MED LIST DOCD IN RCRD: CPT | Performed by: SURGERY

## 2024-12-31 PROCEDURE — G8417 CALC BMI ABV UP PARAM F/U: HCPCS | Performed by: SURGERY

## 2024-12-31 PROCEDURE — 3051F HG A1C>EQUAL 7.0%<8.0%: CPT | Performed by: SURGERY

## 2024-12-31 PROCEDURE — 99203 OFFICE O/P NEW LOW 30 MIN: CPT | Performed by: SURGERY

## 2024-12-31 PROCEDURE — 3077F SYST BP >= 140 MM HG: CPT | Performed by: SURGERY

## 2024-12-31 ASSESSMENT — PATIENT HEALTH QUESTIONNAIRE - PHQ9
SUM OF ALL RESPONSES TO PHQ9 QUESTIONS 1 & 2: 0
SUM OF ALL RESPONSES TO PHQ QUESTIONS 1-9: 0
1. LITTLE INTEREST OR PLEASURE IN DOING THINGS: NOT AT ALL
2. FEELING DOWN, DEPRESSED OR HOPELESS: NOT AT ALL

## 2024-12-31 NOTE — PROGRESS NOTES
Identified pt with two pt identifiers (name and ). Reviewed chart in preparation for visit and have obtained necessary documentation.    Rob Denney is a 69 y.o. male No chief complaint on file.  .    Vitals:    24 1024 24 1026   BP: (!) 151/71 (!) 143/73   Site: Right Lower Arm    Position: Sitting    Cuff Size: Medium Adult    Pulse: 60    Resp: 20    Temp: 97.6 °F (36.4 °C)    TempSrc: Oral    SpO2: 95%    Weight:  124.7 kg (275 lb)   Height: 1.753 m (5' 9\")           1. Have you been to the ER, urgent care clinic since your last visit?  Hospitalized since your last visit?  no     2. Have you seen or consulted any other health care providers outside of the [facility] since your last visit?  Include any pap smears or colon screening.  No       BP elevated. Patient advised to  follow up with PCP and check BP twice a day at home.   
distension.      Palpations: Abdomen is soft. There is no mass.      Tenderness: There is no abdominal tenderness.   Musculoskeletal:      Comments: Right BKA with prosthesis.  Left transmetatarsal amputation with dressing intact.   Neurological:      Mental Status: He is alert.          1. Positive FIT (fecal immunochemical test)  Recommend colonoscopy.  The procedure was explained in detail including the risks and benefits.  Risks shared included risks of missed lesions, incomplete exam, colon injury or perforation.   Risks associated with anesthesia were also discussed.  The patient wishes to proceed and we will schedule.  We will wait until the wound on his left transmetatarsal amputation site has completely healed.    2. Type 2 diabetes mellitus with diabetic polyneuropathy, with long-term current use of insulin ([facility])  Instructed to closely monitor his glucose during his bowel prep and drink a sugary drink if needed.      No follow-ups on file.     Zeeshan Ortega MD

## 2024-12-31 NOTE — PATIENT INSTRUCTIONS
When should you call your doctor?   You have questions or concerns.     You don't understand how to prepare for your procedure.     You are having trouble with the bowel prep.     You become ill before the procedure (such as fever, flu, or a cold).     You need to reschedule or have changed your mind about having the procedure.   Where can you learn more?  Go to https://www.[employer]O.net/patientEd and enter C315 to learn more about \"Colonoscopy: Before Your Procedure.\"  Current as of: May 4, 2022               Content Version: 13.6  © 0485-0853 [employer].   Care instructions adapted under license by [employer]. If you have questions about a medical condition or this instruction, always ask your healthcare professional. [employer] disclaims any warranty or liability for your use of this information.

## 2025-01-01 DIAGNOSIS — E78.2 MIXED HYPERLIPIDEMIA: ICD-10-CM

## 2025-01-01 DIAGNOSIS — I10 ESSENTIAL (PRIMARY) HYPERTENSION: ICD-10-CM

## 2025-01-01 DIAGNOSIS — Z79.4 TYPE 2 DIABETES MELLITUS WITH DIABETIC POLYNEUROPATHY, WITH LONG-TERM CURRENT USE OF INSULIN (HCC): ICD-10-CM

## 2025-01-01 DIAGNOSIS — E11.42 TYPE 2 DIABETES MELLITUS WITH DIABETIC POLYNEUROPATHY, WITH LONG-TERM CURRENT USE OF INSULIN (HCC): ICD-10-CM

## 2025-01-02 ENCOUNTER — OFFICE VISIT (OUTPATIENT)
Age: 70
End: 2025-01-02

## 2025-01-02 VITALS
BODY MASS INDEX: 41.18 KG/M2 | HEIGHT: 69 IN | WEIGHT: 278 LBS | SYSTOLIC BLOOD PRESSURE: 112 MMHG | RESPIRATION RATE: 22 BRPM | OXYGEN SATURATION: 100 % | DIASTOLIC BLOOD PRESSURE: 66 MMHG | TEMPERATURE: 97 F | HEART RATE: 60 BPM

## 2025-01-02 DIAGNOSIS — Z89.511 ACQUIRED ABSENCE OF RIGHT LEG BELOW KNEE (HCC): ICD-10-CM

## 2025-01-02 DIAGNOSIS — Z79.4 TYPE 2 DIABETES MELLITUS WITH DIABETIC POLYNEUROPATHY, WITH LONG-TERM CURRENT USE OF INSULIN (HCC): Primary | ICD-10-CM

## 2025-01-02 DIAGNOSIS — I10 ESSENTIAL HYPERTENSION: ICD-10-CM

## 2025-01-02 DIAGNOSIS — E11.42 TYPE 2 DIABETES MELLITUS WITH DIABETIC POLYNEUROPATHY, WITH LONG-TERM CURRENT USE OF INSULIN (HCC): Primary | ICD-10-CM

## 2025-01-02 DIAGNOSIS — N28.9 KIDNEY DISEASE: ICD-10-CM

## 2025-01-02 DIAGNOSIS — E78.49 OTHER HYPERLIPIDEMIA: ICD-10-CM

## 2025-01-02 SDOH — ECONOMIC STABILITY: FOOD INSECURITY: WITHIN THE PAST 12 MONTHS, YOU WORRIED THAT YOUR FOOD WOULD RUN OUT BEFORE YOU GOT MONEY TO BUY MORE.: NEVER TRUE

## 2025-01-02 SDOH — ECONOMIC STABILITY: FOOD INSECURITY: WITHIN THE PAST 12 MONTHS, THE FOOD YOU BOUGHT JUST DIDN'T LAST AND YOU DIDN'T HAVE MONEY TO GET MORE.: NEVER TRUE

## 2025-01-02 SDOH — ECONOMIC STABILITY: INCOME INSECURITY: HOW HARD IS IT FOR YOU TO PAY FOR THE VERY BASICS LIKE FOOD, HOUSING, MEDICAL CARE, AND HEATING?: NOT HARD AT ALL

## 2025-01-02 ASSESSMENT — PATIENT HEALTH QUESTIONNAIRE - PHQ9
2. FEELING DOWN, DEPRESSED OR HOPELESS: NOT AT ALL
SUM OF ALL RESPONSES TO PHQ QUESTIONS 1-9: 0
SUM OF ALL RESPONSES TO PHQ QUESTIONS 1-9: 0
1. LITTLE INTEREST OR PLEASURE IN DOING THINGS: NOT AT ALL
SUM OF ALL RESPONSES TO PHQ QUESTIONS 1-9: 0
SUM OF ALL RESPONSES TO PHQ QUESTIONS 1-9: 0
SUM OF ALL RESPONSES TO PHQ9 QUESTIONS 1 & 2: 0

## 2025-01-02 NOTE — PROGRESS NOTES
\"Have you been to the ER, urgent care clinic since your last visit?  Hospitalized since your last visit?\"     [facility] right toe amputation    “Have you seen or consulted any other health care providers outside of [facility] since your last visit?”      Yes  Dr Obrien podiatrist recently          Click Here for Release of Records Request      Chief Complaint   Patient presents with    Diabetes         Vitals:    01/02/25 0954   BP: 112/66   Pulse: 60   Resp: 22   Temp: 97 °F (36.1 °C)   SpO2: 100%     
guarding, rebound, masses, hepatic or spleen enlargement  Back: no CVA tenderness.  Extremities: without clubbing, cyanosis, or edema. R BKA prosthetic applied no sign of  skin breakdown at this time.  Left foot wrapped post surgical debridement   Pulses: radial and femoral pulses are normal  Neuro: HMF intact. Cranial nerves II through XII grossly normal.  Motor: is 5 over 5 and symmetrical.   Deep tendon reflexes: +2 equal  Psych:appropriate behavior, mood, affect and judgement.   No results found for this visit on 01/02/25.    Lab Results   Component Value Date    WBC 8.3 02/15/2023    HGB 15.6 02/15/2023    HCT 49.3 02/15/2023     02/15/2023    CHOL 104 02/27/2024    TRIG 114 02/27/2024    HDL 35 (L) 02/27/2024    ALT 22 02/27/2024    AST 19 02/27/2024     02/27/2024    K 4.0 02/27/2024     02/27/2024    CREATININE 0.86 02/27/2024    BUN 9 02/27/2024    CO2 23 02/27/2024    PSA 0.5 02/27/2024    LABA1C 7.2 (H) 02/27/2024        Assessment/ Plan:     1. Type 2 diabetes mellitus with diabetic polyneuropathy, with long-term current use of insulin ([facility]) Log reviewed and scanned into chart     - COLLECTION VENOUS BLOOD,VENIPUNCTURE  - Hemoglobin A1C; Future  - Lipid Panel; Future  - Comprehensive Metabolic Panel; Future  - CBC with Auto Differential; Future  - Albumin/Creatinine Ratio, Urine; Future  - Albumin/Creatinine Ratio, Urine  - CBC with Auto Differential  - Comprehensive Metabolic Panel  - Lipid Panel  - Hemoglobin A1C    2. Essential hypertension  BP in goal continue taking   carvedilol (COREG) 12.5 MG tablet, TAKE 1 TABLET BY MOUTH TWICE  DAILY, Disp: 180 tablet, Rfl: 3    amLODIPine (NORVASC) 5 MG tablet, TAKE 1 TABLET BY MOUTH DAILY FOR HIGH BLOOD PRESSURE, Disp: 90 tablet, Rfl: 3  - COLLECTION VENOUS BLOOD,VENIPUNCTURE  - Hemoglobin A1C; Future  - Lipid Panel; Future  - Comprehensive Metabolic Panel; Future  - CBC with Auto Differential; Future  - Albumin/Creatinine Ratio, Urine;

## 2025-01-03 ENCOUNTER — ANESTHESIA EVENT (OUTPATIENT)
Facility: HOSPITAL | Age: 70
End: 2025-01-03
Payer: MEDICARE

## 2025-01-03 LAB
ALBUMIN SERPL-MCNC: 3 G/DL (ref 3.5–5)
ALBUMIN/GLOB SERPL: 0.8 (ref 1.1–2.2)
ALP SERPL-CCNC: 99 U/L (ref 45–117)
ALT SERPL-CCNC: 18 U/L (ref 12–78)
ANION GAP SERPL CALC-SCNC: 6 MMOL/L (ref 2–12)
AST SERPL-CCNC: 15 U/L (ref 15–37)
BASOPHILS # BLD: 0 K/UL (ref 0–0.1)
BASOPHILS NFR BLD: 1 % (ref 0–1)
BILIRUB SERPL-MCNC: 0.4 MG/DL (ref 0.2–1)
BUN SERPL-MCNC: 20 MG/DL (ref 6–20)
BUN/CREAT SERPL: 14 (ref 12–20)
CALCIUM SERPL-MCNC: 8.4 MG/DL (ref 8.5–10.1)
CHLORIDE SERPL-SCNC: 110 MMOL/L (ref 97–108)
CHOLEST SERPL-MCNC: 96 MG/DL
CO2 SERPL-SCNC: 24 MMOL/L (ref 21–32)
CREAT SERPL-MCNC: 1.46 MG/DL (ref 0.7–1.3)
CREAT UR-MCNC: 102 MG/DL
DIFFERENTIAL METHOD BLD: ABNORMAL
EOSINOPHIL # BLD: 0.1 K/UL (ref 0–0.4)
EOSINOPHIL NFR BLD: 1 % (ref 0–7)
ERYTHROCYTE [DISTWIDTH] IN BLOOD BY AUTOMATED COUNT: 17.1 % (ref 11.5–14.5)
EST. AVERAGE GLUCOSE BLD GHB EST-MCNC: 166 MG/DL
GLOBULIN SER CALC-MCNC: 3.8 G/DL (ref 2–4)
GLUCOSE SERPL-MCNC: 117 MG/DL (ref 65–100)
HBA1C MFR BLD: 7.4 % (ref 4–5.6)
HCT VFR BLD AUTO: 33.2 % (ref 36.6–50.3)
HDLC SERPL-MCNC: 39 MG/DL
HDLC SERPL: 2.5 (ref 0–5)
HGB BLD-MCNC: 10.6 G/DL (ref 12.1–17)
IMM GRANULOCYTES # BLD AUTO: 0 K/UL (ref 0–0.04)
IMM GRANULOCYTES NFR BLD AUTO: 1 % (ref 0–0.5)
LDLC SERPL CALC-MCNC: 33.4 MG/DL (ref 0–100)
LYMPHOCYTES # BLD: 1.5 K/UL (ref 0.8–3.5)
LYMPHOCYTES NFR BLD: 18 % (ref 12–49)
MCH RBC QN AUTO: 30.2 PG (ref 26–34)
MCHC RBC AUTO-ENTMCNC: 31.9 G/DL (ref 30–36.5)
MCV RBC AUTO: 94.6 FL (ref 80–99)
MICROALBUMIN UR-MCNC: 129 MG/DL
MICROALBUMIN/CREAT UR-RTO: 1265 MG/G (ref 0–30)
MONOCYTES # BLD: 0.8 K/UL (ref 0–1)
MONOCYTES NFR BLD: 9 % (ref 5–13)
NEUTS SEG # BLD: 6.1 K/UL (ref 1.8–8)
NEUTS SEG NFR BLD: 70 % (ref 32–75)
NRBC # BLD: 0 K/UL (ref 0–0.01)
NRBC BLD-RTO: 0 PER 100 WBC
PLATELET # BLD AUTO: 244 K/UL (ref 150–400)
PMV BLD AUTO: 11.4 FL (ref 8.9–12.9)
POTASSIUM SERPL-SCNC: 4 MMOL/L (ref 3.5–5.1)
PROT SERPL-MCNC: 6.8 G/DL (ref 6.4–8.2)
RBC # BLD AUTO: 3.51 M/UL (ref 4.1–5.7)
SODIUM SERPL-SCNC: 140 MMOL/L (ref 136–145)
TRIGL SERPL-MCNC: 118 MG/DL
VLDLC SERPL CALC-MCNC: 23.6 MG/DL
WBC # BLD AUTO: 8.5 K/UL (ref 4.1–11.1)

## 2025-01-03 RX ORDER — CHOLECALCIFEROL (VITAMIN D3) 10(400)/ML
160 DROPS ORAL 2 TIMES DAILY
Qty: 30 TABLET | Refills: 0 | Status: SHIPPED | OUTPATIENT
Start: 2025-01-03

## 2025-01-03 NOTE — ANESTHESIA PRE PROCEDURE
Department of Anesthesiology  Preprocedure Note       Name:  Rob Denney   Age:  69 y.o.  :  1955                                          MRN:  [id number]         Date:  1/3/2025      Surgeon: Surgeon(s):  Zeeshan Ortega MD    Procedure: Procedure(s):  COLONOSCOPY    Medications prior to admission:   Prior to Admission medications    Medication Sig Start Date End Date Taking? Authorizing Provider   ferrous sulfate dried (SLOW IRON) 160 (50 Fe) MG TBCR extended release tablet Take 160 mg by mouth 2 times daily 1/3/25   Kandi Casey APRN - NP   insulin glargine (LANTUS SOLOSTAR) 100 UNIT/ML injection pen Summary: INJECT SUBCUTANEOUSLY 60 UNITS IN THE MORNING AND 35 UNITS IN THE EVENING, Disp-150 mL, R-3 Please send a replace/new response with 90-Day Supply if appropriate to maximize member benefit. Requesting 1 year supply. Normal DX E11.65 10/24/24   Kandi Casey APRN - NP   Continuous Glucose Sensor (DEXCOM G7 SENSOR) MISC Use as directed apply a new sensor every 10 days 10/24/24   Kandi Casey APRN - NP   Continuous Glucose  (DEXCOM G7 ) MERRICK Use as directed with Dexcom G7 sensor 10/24/24   Kandi Casey APRN - NP   linagliptin (TRADJENTA) 5 MG tablet Take 1 tablet by mouth daily 10/23/24   Kandi Casey APRN - NP   insulin lispro, 1 Unit Dial, (HUMALOG KWIKPEN) 100 UNIT/ML SOPN 48 units with breakfast, 48 units with dinner (no lunch) 24   Kandi Casey APRN - NP   B-D UF III MINI PEN NEEDLES 31G X 5 MM MISC USE AS DIRECTED 5 TIMES DAILY 9/3/24   Katie Zuñiga APRN - NP   pioglitazone (ACTOS) 15 MG tablet TAKE 1 TABLET BY MOUTH DAILY 24   Rocky Goncalves MD   ACCU-CHEK GUIDE strip CHECK BLOOD GLUCOSE TWICE DAILY AND AS NEEDED 24   Kandi Casey APRN - NP   carvedilol (COREG) 12.5 MG tablet TAKE 1 TABLET BY MOUTH TWICE  DAILY 24   Casey, Kandi L, APRN - NP   amLODIPine (NORVASC) 5 MG tablet TAKE 1 TABLET BY MOUTH DAILY FOR HIGH BLOOD PRESSURE

## 2025-01-14 RX ORDER — ATORVASTATIN CALCIUM 20 MG/1
20 TABLET, FILM COATED ORAL DAILY
Qty: 90 TABLET | Refills: 1 | Status: SHIPPED | OUTPATIENT
Start: 2025-01-14

## 2025-01-15 RX ORDER — AMLODIPINE BESYLATE 5 MG/1
5 TABLET ORAL DAILY
Qty: 90 TABLET | Refills: 3 | Status: SHIPPED | OUTPATIENT
Start: 2025-01-15

## 2025-01-15 RX ORDER — CARVEDILOL 12.5 MG/1
TABLET ORAL
Qty: 180 TABLET | Refills: 3 | Status: SHIPPED | OUTPATIENT
Start: 2025-01-15

## 2025-01-31 ENCOUNTER — TELEPHONE (OUTPATIENT)
Age: 70
End: 2025-01-31

## 2025-03-05 NOTE — PERIOP NOTE
[facility]  SURGICAL PRE-ADMISSION INSTRUCTIONS    ARRIVAL  You will be called the day before your surgery with your expected arrival time.  Sign in at the  of the hospital.  You will be directed to the Surgical Waiting Room.  Please arrive at your scheduled appointment time.  You have been scheduled to arrive for your procedure one or two hours prior to the expected start time of your procedure.  Every effort will be made to minimize your wait but please be aware that unforeseen circumstances may affect our schedule.    EATING  DO NOT EAT OR DRINK ANYTHING AFTER MIDNIGHT ON THE EVENING BEFORE YOUR SURGERY OR ON THE DAY OF YOUR SURGERY except for your medications (as instructed) with a sip of water.  Do not use gum, mints or lozenges on the morning of your surgery.  Please do not smoke or chew tobacco before your surgery.    MEDICATIONS   Take the following medications on the morning of your surgery with the smallest amount of water possible : Amlodipine, Carvedilol    STOP THESE MEDICATIONS AT THE TIMES LISTED BELOW  Aspirin ;  7 days before                                                   DRIVING/TRANSPORATION  Have a responsible adult to drive you home from the hospital and to stay with you over night.  Please have them plan to remain in the hospital during your surgery.  Your surgery will not be done if you do not have a responsible adult to take you home and to stay with you.  If you have arranged for public transport, you must have a responsible adult to ride with you (who is not the ).  You may not drive for 24 hours after anesthesia.    PREPARATION  If you have a Living WiIl/Advance Directive, please bring a copy with you to scan into your chart.   Please DO NOT wear makeup or nail polish  Please leave valuables at home,  DO NOT wear jewelry.  Wear loose, comfortable clothing that is large enough to cover a bulky dressing.    SPECIAL INSTRUCTIONS:  Follow your

## 2025-03-11 ENCOUNTER — PREP FOR PROCEDURE (OUTPATIENT)
Age: 70
End: 2025-03-11

## 2025-03-11 RX ORDER — SODIUM CHLORIDE, SODIUM LACTATE, POTASSIUM CHLORIDE, CALCIUM CHLORIDE 600; 310; 30; 20 MG/100ML; MG/100ML; MG/100ML; MG/100ML
INJECTION, SOLUTION INTRAVENOUS CONTINUOUS
Status: CANCELLED | OUTPATIENT
Start: 2025-03-11

## 2025-03-11 RX ORDER — SODIUM CHLORIDE 9 MG/ML
INJECTION, SOLUTION INTRAVENOUS PRN
Status: CANCELLED | OUTPATIENT
Start: 2025-03-11

## 2025-03-11 RX ORDER — SODIUM CHLORIDE 0.9 % (FLUSH) 0.9 %
5-40 SYRINGE (ML) INJECTION EVERY 12 HOURS SCHEDULED
Status: CANCELLED | OUTPATIENT
Start: 2025-03-11

## 2025-03-11 RX ORDER — SODIUM CHLORIDE 0.9 % (FLUSH) 0.9 %
5-40 SYRINGE (ML) INJECTION PRN
Status: CANCELLED | OUTPATIENT
Start: 2025-03-11

## 2025-03-11 NOTE — H&P
HPI    69-year-old male patient of Kandi Casey who presents for colonoscopy after having a positive fecal occult blood test.  He has had 2 colonoscopies with the most recent being in 2014 which was negative for polyps but did reveal diverticulosis.  He has no family history of colon cancer.  He denies any personal history of melena or hematochezia or diarrhea or constipation on a consistent basis.  He has had no abdominal pain or unexpected weight loss.  The fecal occult blood testing was performed back in July 2024.    He has insulin-dependent diabetes and has had a right BKA in the past for complications of this. in October of this year he had a left transmetatarsal amputation for complications of diabetes as well.    He does not smoke or drink alcohol.    He does take an 81 mg aspirin a day.  Past Medical History:   Diagnosis Date    Diabetes (HCC)     High cholesterol     Hypertension     Nausea & vomiting     Osteomyelitis of right foot (HCC) 1/17/2017       Past Surgical History:   Procedure Laterality Date    CATARACT REMOVAL Bilateral 2012    ORTHOPEDIC SURGERY Right 03/27/2019    BKA    ORTHOPEDIC SURGERY Right     great toe amputation    ORTHOPEDIC SURGERY Left     2 small toes amputated       Social History     Socioeconomic History    Marital status:      Spouse name: Not on file    Number of children: Not on file    Years of education: Not on file    Highest education level: Not on file   Occupational History    Not on file   Tobacco Use    Smoking status: Never     Passive exposure: Never    Smokeless tobacco: Never   Vaping Use    Vaping status: Never Used   Substance and Sexual Activity    Alcohol use: No    Drug use: No    Sexual activity: Defer   Other Topics Concern    Not on file   Social History Narrative    Not on file     Social Drivers of Health     Financial Resource Strain: Low Risk  (1/2/2025)    Overall Financial Resource Strain (CARDIA)     Difficulty of Paying Living Expenses:

## 2025-03-12 ENCOUNTER — ANESTHESIA (OUTPATIENT)
Facility: HOSPITAL | Age: 70
End: 2025-03-12
Payer: MEDICARE

## 2025-03-12 ENCOUNTER — HOSPITAL ENCOUNTER (OUTPATIENT)
Facility: HOSPITAL | Age: 70
Setting detail: OUTPATIENT SURGERY
Discharge: HOME | End: 2025-03-12
Attending: SURGERY | Admitting: SURGERY
Payer: MEDICARE

## 2025-03-12 VITALS
TEMPERATURE: 98 F | BODY MASS INDEX: 37.8 KG/M2 | SYSTOLIC BLOOD PRESSURE: 145 MMHG | WEIGHT: 270 LBS | DIASTOLIC BLOOD PRESSURE: 63 MMHG | HEART RATE: 61 BPM | OXYGEN SATURATION: 95 % | RESPIRATION RATE: 13 BRPM | HEIGHT: 71 IN

## 2025-03-12 LAB
GLUCOSE BLD STRIP.AUTO-MCNC: 172 MG/DL (ref 65–117)
SERVICE CMNT-IMP: ABNORMAL

## 2025-03-12 PROCEDURE — 7100000011 HC PHASE II RECOVERY - ADDTL 15 MIN: Performed by: SURGERY

## 2025-03-12 PROCEDURE — 3600000012 HC SURGERY LEVEL 2 ADDTL 15MIN: Performed by: SURGERY

## 2025-03-12 PROCEDURE — 88305 TISSUE EXAM BY PATHOLOGIST: CPT

## 2025-03-12 PROCEDURE — 7100000010 HC PHASE II RECOVERY - FIRST 15 MIN: Performed by: SURGERY

## 2025-03-12 PROCEDURE — 3700000000 HC ANESTHESIA ATTENDED CARE: Performed by: SURGERY

## 2025-03-12 PROCEDURE — 6360000002 HC RX W HCPCS: Performed by: ANESTHESIOLOGY

## 2025-03-12 PROCEDURE — 2709999900 HC NON-CHARGEABLE SUPPLY: Performed by: SURGERY

## 2025-03-12 PROCEDURE — 3700000001 HC ADD 15 MINUTES (ANESTHESIA): Performed by: SURGERY

## 2025-03-12 PROCEDURE — 3600000002 HC SURGERY LEVEL 2 BASE: Performed by: SURGERY

## 2025-03-12 PROCEDURE — 2580000003 HC RX 258: Performed by: SURGERY

## 2025-03-12 PROCEDURE — 82962 GLUCOSE BLOOD TEST: CPT

## 2025-03-12 RX ORDER — NALOXONE HYDROCHLORIDE 0.4 MG/ML
INJECTION, SOLUTION INTRAMUSCULAR; INTRAVENOUS; SUBCUTANEOUS PRN
Status: DISCONTINUED | OUTPATIENT
Start: 2025-03-12 | End: 2025-03-12 | Stop reason: HOSPADM

## 2025-03-12 RX ORDER — SODIUM CHLORIDE 9 MG/ML
INJECTION, SOLUTION INTRAVENOUS PRN
Status: DISCONTINUED | OUTPATIENT
Start: 2025-03-12 | End: 2025-03-12 | Stop reason: HOSPADM

## 2025-03-12 RX ORDER — SODIUM CHLORIDE 0.9 % (FLUSH) 0.9 %
5-40 SYRINGE (ML) INJECTION EVERY 12 HOURS SCHEDULED
Status: DISCONTINUED | OUTPATIENT
Start: 2025-03-12 | End: 2025-03-12 | Stop reason: HOSPADM

## 2025-03-12 RX ORDER — PROPOFOL 10 MG/ML
INJECTION, EMULSION INTRAVENOUS
Status: DISCONTINUED | OUTPATIENT
Start: 2025-03-12 | End: 2025-03-12 | Stop reason: SDUPTHER

## 2025-03-12 RX ORDER — SODIUM CHLORIDE 0.9 % (FLUSH) 0.9 %
5-40 SYRINGE (ML) INJECTION PRN
Status: DISCONTINUED | OUTPATIENT
Start: 2025-03-12 | End: 2025-03-12 | Stop reason: HOSPADM

## 2025-03-12 RX ORDER — DIPHENHYDRAMINE HYDROCHLORIDE 50 MG/ML
12.5 INJECTION INTRAMUSCULAR; INTRAVENOUS
Status: DISCONTINUED | OUTPATIENT
Start: 2025-03-12 | End: 2025-03-12 | Stop reason: HOSPADM

## 2025-03-12 RX ORDER — METOCLOPRAMIDE HYDROCHLORIDE 5 MG/ML
10 INJECTION INTRAMUSCULAR; INTRAVENOUS
Status: DISCONTINUED | OUTPATIENT
Start: 2025-03-12 | End: 2025-03-12 | Stop reason: HOSPADM

## 2025-03-12 RX ORDER — HYDRALAZINE HYDROCHLORIDE 20 MG/ML
10 INJECTION INTRAMUSCULAR; INTRAVENOUS
Status: DISCONTINUED | OUTPATIENT
Start: 2025-03-12 | End: 2025-03-12 | Stop reason: HOSPADM

## 2025-03-12 RX ORDER — DEXAMETHASONE SODIUM PHOSPHATE 4 MG/ML
4 INJECTION, SOLUTION INTRA-ARTICULAR; INTRALESIONAL; INTRAMUSCULAR; INTRAVENOUS; SOFT TISSUE
Status: DISCONTINUED | OUTPATIENT
Start: 2025-03-12 | End: 2025-03-12 | Stop reason: HOSPADM

## 2025-03-12 RX ORDER — LIDOCAINE HYDROCHLORIDE 20 MG/ML
INJECTION, SOLUTION EPIDURAL; INFILTRATION; INTRACAUDAL; PERINEURAL
Status: DISCONTINUED | OUTPATIENT
Start: 2025-03-12 | End: 2025-03-12 | Stop reason: SDUPTHER

## 2025-03-12 RX ORDER — SODIUM CHLORIDE, SODIUM LACTATE, POTASSIUM CHLORIDE, CALCIUM CHLORIDE 600; 310; 30; 20 MG/100ML; MG/100ML; MG/100ML; MG/100ML
INJECTION, SOLUTION INTRAVENOUS CONTINUOUS
Status: DISCONTINUED | OUTPATIENT
Start: 2025-03-12 | End: 2025-03-12 | Stop reason: HOSPADM

## 2025-03-12 RX ORDER — GLYCOPYRROLATE 0.2 MG/ML
INJECTION INTRAMUSCULAR; INTRAVENOUS
Status: DISCONTINUED | OUTPATIENT
Start: 2025-03-12 | End: 2025-03-12 | Stop reason: SDUPTHER

## 2025-03-12 RX ORDER — ACETAMINOPHEN 325 MG/1
650 TABLET ORAL ONCE
Status: DISCONTINUED | OUTPATIENT
Start: 2025-03-12 | End: 2025-03-12 | Stop reason: HOSPADM

## 2025-03-12 RX ORDER — LABETALOL HYDROCHLORIDE 5 MG/ML
10 INJECTION, SOLUTION INTRAVENOUS
Status: DISCONTINUED | OUTPATIENT
Start: 2025-03-12 | End: 2025-03-12 | Stop reason: HOSPADM

## 2025-03-12 RX ADMIN — PROPOFOL 25 MG: 10 INJECTION, EMULSION INTRAVENOUS at 08:04

## 2025-03-12 RX ADMIN — PROPOFOL 25 MG: 10 INJECTION, EMULSION INTRAVENOUS at 08:08

## 2025-03-12 RX ADMIN — PROPOFOL 25 MG: 10 INJECTION, EMULSION INTRAVENOUS at 08:13

## 2025-03-12 RX ADMIN — SODIUM CHLORIDE, SODIUM LACTATE, POTASSIUM CHLORIDE, AND CALCIUM CHLORIDE: .6; .31; .03; .02 INJECTION, SOLUTION INTRAVENOUS at 07:17

## 2025-03-12 RX ADMIN — PROPOFOL 25 MG: 10 INJECTION, EMULSION INTRAVENOUS at 07:49

## 2025-03-12 RX ADMIN — LIDOCAINE HYDROCHLORIDE 50 MG: 20 INJECTION, SOLUTION EPIDURAL; INFILTRATION; INTRACAUDAL; PERINEURAL at 07:41

## 2025-03-12 RX ADMIN — GLYCOPYRROLATE 0.2 MG: 0.2 INJECTION INTRAMUSCULAR; INTRAVENOUS at 07:41

## 2025-03-12 RX ADMIN — PROPOFOL 50 MG: 10 INJECTION, EMULSION INTRAVENOUS at 07:46

## 2025-03-12 RX ADMIN — PROPOFOL 25 MG: 10 INJECTION, EMULSION INTRAVENOUS at 08:02

## 2025-03-12 RX ADMIN — PROPOFOL 25 MG: 10 INJECTION, EMULSION INTRAVENOUS at 08:18

## 2025-03-12 RX ADMIN — PROPOFOL 25 MG: 10 INJECTION, EMULSION INTRAVENOUS at 07:59

## 2025-03-12 RX ADMIN — PROPOFOL 25 MG: 10 INJECTION, EMULSION INTRAVENOUS at 08:00

## 2025-03-12 RX ADMIN — PROPOFOL 25 MG: 10 INJECTION, EMULSION INTRAVENOUS at 07:53

## 2025-03-12 RX ADMIN — PROPOFOL 25 MG: 10 INJECTION, EMULSION INTRAVENOUS at 07:56

## 2025-03-12 ASSESSMENT — PAIN - FUNCTIONAL ASSESSMENT: PAIN_FUNCTIONAL_ASSESSMENT: NONE - DENIES PAIN

## 2025-03-12 NOTE — ANESTHESIA POSTPROCEDURE EVALUATION
Department of Anesthesiology  Postprocedure Note    Patient: Rob Denney  MRN: [id number]  YOB: 1955  Date of evaluation: 3/12/2025    Procedure Summary       Date: 03/12/25 Room / Location: [facility] MAIN OR [address] [place] [address] MAIN OR    Anesthesia Start: 0741 Anesthesia Stop: 0827    Procedure: COLONOSCOPY WITH HOT SNARE POLYPECTOMY X2 (Lower GI Region) Diagnosis:       Positive FIT (fecal immunochemical test)      (Positive FIT (fecal immunochemical test) [R19.5])    Surgeons: Zeeshan Ortega MD Responsible Provider: Mario Alberto Bianchi MD    Anesthesia Type: MAC ASA Status: 3            Anesthesia Type: No value filed.    Tonny Phase I:      Tonny Phase II: Tonny Score: 10    Anesthesia Post Evaluation    Patient location during evaluation: PACU  Patient participation: complete - patient participated  Level of consciousness: awake  Pain score: 0  Airway patency: patent  Nausea & Vomiting: no nausea and no vomiting  Cardiovascular status: hemodynamically stable  Respiratory status: acceptable and room air  Hydration status: euvolemic  Pain management: adequate    No notable events documented.

## 2025-03-12 NOTE — OP NOTE
[address]  [address]                            OPERATIVE REPORT      PATIENT NAME: ROSALIA LIVINGSTON              : 1955  MED REC NO: [id number]                       ROOM: OR  ACCOUNT NO: [id number]                       ADMIT DATE: 2025  PROVIDER: Zeeshan Ortega MD    DATE OF SERVICE:  2025    PREOPERATIVE DIAGNOSES:  Positive fit test.    POSTOPERATIVE DIAGNOSES:  Positive fit test.    PROCEDURES PERFORMED:  Colonoscopy with hot snare polypectomy x2.    SURGEON:  Zeeshan Ortega MD    ASSISTANT:  None.    ANESTHESIA:  MAC.    ESTIMATED BLOOD LOSS:  Minimal.    SPECIMENS REMOVED:  Sigmoid colon polyp at 25 cm.    INTRAOPERATIVE FINDINGS:       1. A 5 mm ascending colon polyp removed by hot snare with specimen not able to be retrieved.     2. Sigmoid colon polyp at 25 cm, removed by hot snare.     3. Sigmoid diverticulosis.     COMPLICATIONS:  None.    IMPLANTS:  None.    INDICATIONS:  Positive fit test.    DESCRIPTION OF PROCEDURE:  The patient was brought to the operative theater.  Monitoring devices and nasal cannula O2 placed per Anesthesia.  The patient was placed in a left-side-down decubitus position.  IV sedation was administered and a time-out was performed.  Digital rectal exam was performed, which was essentially normal.  A well-lubricated Olympus colonoscope was introduced in the patient's rectum and advanced to the cecum.  The cecum was identified by the appendiceal orifice and ileocecal valve.  The prep was suboptimal in areas in particular in the descending colon, but felt to be adequate to proceed.  The scope was carefully withdrawn with mucosal surfaces circumferentially evaluated.  No significant abnormalities were noted in the cecum or ascending colon other than a 5 mm polyp that was removed by hot snare.  The specimen was suctioned into the colonoscope.  However, there was significant stool and

## 2025-03-12 NOTE — PERIOP NOTE
Patient has met discharge criteria for discharge to home. Pt has no complaints of pain or tenderness, no nausea or vomiting, abdomen is soft and passing flatus, A & O X 3.   Received faxed request for refill on Atorvastatin 80 mg tablet  [id number]

## 2025-03-12 NOTE — INTERVAL H&P NOTE
Update History & Physical    The patient's History and Physical of March 11, 2025 was reviewed with the patient and I examined the patient. There was no change. The surgical site was confirmed by the patient and me.     Plan: The risks, benefits, expected outcome, and alternative to the recommended procedure have been discussed with the patient. Patient understands and wants to proceed with the procedure.     Electronically signed by Zeeshan Ortega MD on 3/12/2025 at 7:38 AM

## 2025-03-12 NOTE — BRIEF OP NOTE
Brief Postoperative Note      Patient: Rob Denney  YOB: 1955  MRN: [id number]    Date of Procedure: 3/12/2025    Pre-Op Diagnosis Codes:      * Positive FIT (fecal immunochemical test) [R19.5]    Post-Op Diagnosis: Same       Procedure(s):  COLONOSCOPY WITH HOT SNARE POLYPECTOMY X2    Surgeon(s):  Zeeshan Ortega MD    Assistant:  * No surgical staff found *    Anesthesia: Monitor Anesthesia Care    Estimated Blood Loss (mL): Minimal    Complications: None    Specimens:   ID Type Source Tests Collected by Time Destination   1 : Sigmoid colon polyp @25cm Tissue Sigmoid Colon SURGICAL PATHOLOGY Zeeshan Ortega MD 3/12/2025 0817        Implants:  * No implants in log *      Drains: * No LDAs found *    Findings:  Infection Present At Time Of Surgery (PATOS) (choose all levels that have infection present):  No infection present  Other Findings:   1. Ascending colon polyp - not retrieved  2. Sigmoid colon polyp at 25 cm - hot snare  3. Diverticulosis      Electronically signed by Zeeshan Ortega MD on 3/12/2025 at 8:27 AM

## 2025-03-13 DIAGNOSIS — Z79.4 TYPE 2 DIABETES MELLITUS WITH DIABETIC POLYNEUROPATHY, WITH LONG-TERM CURRENT USE OF INSULIN (HCC): ICD-10-CM

## 2025-03-13 DIAGNOSIS — E11.42 TYPE 2 DIABETES MELLITUS WITH DIABETIC POLYNEUROPATHY, WITH LONG-TERM CURRENT USE OF INSULIN (HCC): ICD-10-CM

## 2025-03-13 RX ORDER — LINAGLIPTIN 5 MG/1
5 TABLET, FILM COATED ORAL DAILY
Qty: 90 TABLET | Refills: 0 | Status: SHIPPED | OUTPATIENT
Start: 2025-03-13

## 2025-03-20 ENCOUNTER — OFFICE VISIT (OUTPATIENT)
Age: 70
End: 2025-03-20
Payer: MEDICARE

## 2025-03-20 VITALS
BODY MASS INDEX: 37.66 KG/M2 | HEART RATE: 63 BPM | DIASTOLIC BLOOD PRESSURE: 66 MMHG | OXYGEN SATURATION: 96 % | HEIGHT: 71 IN | RESPIRATION RATE: 16 BRPM | SYSTOLIC BLOOD PRESSURE: 162 MMHG

## 2025-03-20 DIAGNOSIS — K57.90 DIVERTICULOSIS: ICD-10-CM

## 2025-03-20 DIAGNOSIS — Z86.0101 PERSONAL HISTORY OF ADENOMATOUS AND SERRATED COLON POLYPS: Primary | ICD-10-CM

## 2025-03-20 PROCEDURE — 3017F COLORECTAL CA SCREEN DOC REV: CPT | Performed by: SURGERY

## 2025-03-20 PROCEDURE — 1160F RVW MEDS BY RX/DR IN RCRD: CPT | Performed by: SURGERY

## 2025-03-20 PROCEDURE — 1126F AMNT PAIN NOTED NONE PRSNT: CPT | Performed by: SURGERY

## 2025-03-20 PROCEDURE — 3077F SYST BP >= 140 MM HG: CPT | Performed by: SURGERY

## 2025-03-20 PROCEDURE — G8427 DOCREV CUR MEDS BY ELIG CLIN: HCPCS | Performed by: SURGERY

## 2025-03-20 PROCEDURE — 1123F ACP DISCUSS/DSCN MKR DOCD: CPT | Performed by: SURGERY

## 2025-03-20 PROCEDURE — 1159F MED LIST DOCD IN RCRD: CPT | Performed by: SURGERY

## 2025-03-20 PROCEDURE — G8417 CALC BMI ABV UP PARAM F/U: HCPCS | Performed by: SURGERY

## 2025-03-20 PROCEDURE — 99213 OFFICE O/P EST LOW 20 MIN: CPT | Performed by: SURGERY

## 2025-03-20 PROCEDURE — 1036F TOBACCO NON-USER: CPT | Performed by: SURGERY

## 2025-03-20 PROCEDURE — 3078F DIAST BP <80 MM HG: CPT | Performed by: SURGERY

## 2025-03-20 ASSESSMENT — PATIENT HEALTH QUESTIONNAIRE - PHQ9
SUM OF ALL RESPONSES TO PHQ QUESTIONS 1-9: 0
2. FEELING DOWN, DEPRESSED OR HOPELESS: NOT AT ALL
1. LITTLE INTEREST OR PLEASURE IN DOING THINGS: NOT AT ALL
SUM OF ALL RESPONSES TO PHQ QUESTIONS 1-9: 0

## 2025-03-20 NOTE — PROGRESS NOTES
Rob Denney is a 69 y.o. male who presents today with the following:  Chief Complaint   Patient presents with    Post-Op Check     Colonoscopy result       HPI    Mr. Denney presents in follow-up after his colonoscopy was performed back on March 12.  He has done well since the procedure.  Findings of sigmoid diverticulosis and 2 polyps with 1 being 5 mm and the other being 1.3 cm were removed.  The largest polyp was a tubulovillous adenoma.  His prep was also suboptimal.    Past Medical History:   Diagnosis Date    Diabetes (HCC)     High cholesterol     Hypertension     Nausea & vomiting     Osteomyelitis of right foot (HCC) 1/17/2017       Past Surgical History:   Procedure Laterality Date    CATARACT REMOVAL Bilateral 2012    COLONOSCOPY N/A 3/12/2025    COLONOSCOPY WITH HOT SNARE POLYPECTOMY X2 performed by Zeeshan Ortega MD at [facility] MAIN OR    ORTHOPEDIC SURGERY Right 03/27/2019    BKA    ORTHOPEDIC SURGERY Right     great toe amputation    ORTHOPEDIC SURGERY Left     2 small toes amputated       Social History     Socioeconomic History    Marital status:      Spouse name: Not on file    Number of children: Not on file    Years of education: Not on file    Highest education level: Not on file   Occupational History    Not on file   Tobacco Use    Smoking status: Never     Passive exposure: Never    Smokeless tobacco: Never   Vaping Use    Vaping status: Never Used   Substance and Sexual Activity    Alcohol use: No    Drug use: No    Sexual activity: Defer   Other Topics Concern    Not on file   Social History Narrative    Not on file     Social Drivers of Health     Financial Resource Strain: Low Risk  (1/2/2025)    Overall Financial Resource Strain (CARDIA)     Difficulty of Paying Living Expenses: Not hard at all   Food Insecurity: No Food Insecurity (1/2/2025)    Hunger Vital Sign     Worried About Running Out of Food in the Last Year: Never true     Ran Out of Food in the Last Year: Never true

## 2025-03-20 NOTE — PROGRESS NOTES
Identified pt with two pt identifiers (name and ). Reviewed chart in preparation for visit and have obtained necessary documentation.    Rob Denney is a 69 y.o. male Post-Op Check (Colonoscopy result)  .    Vitals:    25 1504 25 1507   BP: (!) 155/63 (!) 162/66   BP Site: Right Upper Arm    Patient Position: Sitting    BP Cuff Size: Large Adult    Pulse: 63    Resp: 16    SpO2: 96%    Height: 1.803 m (5' 11\")           1. Have you been to the ER, urgent care clinic since your last visit?  Hospitalized since your last visit?  no     2. Have you seen or consulted any other health care providers outside of the [facility] visit?  Include any pap smears or colon screening.  yes - [facility]    BP elevated. Patient advised to  follow up with PCP and check BP twice a day at home.

## 2025-04-09 ENCOUNTER — OFFICE VISIT (OUTPATIENT)
Age: 70
End: 2025-04-09

## 2025-04-09 VITALS
WEIGHT: 276 LBS | HEART RATE: 74 BPM | OXYGEN SATURATION: 96 % | BODY MASS INDEX: 39.51 KG/M2 | RESPIRATION RATE: 22 BRPM | DIASTOLIC BLOOD PRESSURE: 50 MMHG | TEMPERATURE: 98.1 F | HEIGHT: 70 IN | SYSTOLIC BLOOD PRESSURE: 122 MMHG

## 2025-04-09 DIAGNOSIS — M62.830 BACK SPASM: ICD-10-CM

## 2025-04-09 DIAGNOSIS — Z00.00 MEDICARE ANNUAL WELLNESS VISIT, SUBSEQUENT: Primary | ICD-10-CM

## 2025-04-09 DIAGNOSIS — Z79.4 TYPE 2 DIABETES MELLITUS WITH DIABETIC POLYNEUROPATHY, WITH LONG-TERM CURRENT USE OF INSULIN (HCC): ICD-10-CM

## 2025-04-09 DIAGNOSIS — Z00.00 ENCOUNTER FOR MEDICARE ANNUAL WELLNESS EXAM: ICD-10-CM

## 2025-04-09 DIAGNOSIS — Z09 HOSPITAL DISCHARGE FOLLOW-UP: ICD-10-CM

## 2025-04-09 DIAGNOSIS — E11.42 TYPE 2 DIABETES MELLITUS WITH DIABETIC POLYNEUROPATHY, WITH LONG-TERM CURRENT USE OF INSULIN (HCC): ICD-10-CM

## 2025-04-09 DIAGNOSIS — E11.65 INADEQUATELY CONTROLLED DIABETES MELLITUS (HCC): ICD-10-CM

## 2025-04-09 DIAGNOSIS — E16.2 HYPOGLYCEMIA: ICD-10-CM

## 2025-04-09 DIAGNOSIS — I10 ESSENTIAL HYPERTENSION: ICD-10-CM

## 2025-04-09 RX ORDER — INSULIN GLARGINE 100 [IU]/ML
INJECTION, SOLUTION SUBCUTANEOUS
Qty: 5 ADJUSTABLE DOSE PRE-FILLED PEN SYRINGE | Refills: 5 | Status: SHIPPED | OUTPATIENT
Start: 2025-04-09

## 2025-04-09 RX ORDER — ATORVASTATIN CALCIUM 10 MG/1
10 TABLET, FILM COATED ORAL DAILY
Qty: 90 TABLET | Refills: 1 | Status: SHIPPED | OUTPATIENT
Start: 2025-04-09

## 2025-04-09 SDOH — ECONOMIC STABILITY: FOOD INSECURITY: WITHIN THE PAST 12 MONTHS, YOU WORRIED THAT YOUR FOOD WOULD RUN OUT BEFORE YOU GOT MONEY TO BUY MORE.: NEVER TRUE

## 2025-04-09 SDOH — ECONOMIC STABILITY: FOOD INSECURITY: WITHIN THE PAST 12 MONTHS, THE FOOD YOU BOUGHT JUST DIDN'T LAST AND YOU DIDN'T HAVE MONEY TO GET MORE.: NEVER TRUE

## 2025-04-09 ASSESSMENT — PATIENT HEALTH QUESTIONNAIRE - PHQ9
1. LITTLE INTEREST OR PLEASURE IN DOING THINGS: NOT AT ALL
SUM OF ALL RESPONSES TO PHQ QUESTIONS 1-9: 0
2. FEELING DOWN, DEPRESSED OR HOPELESS: NOT AT ALL
SUM OF ALL RESPONSES TO PHQ QUESTIONS 1-9: 0

## 2025-04-09 ASSESSMENT — LIFESTYLE VARIABLES
HOW OFTEN DO YOU HAVE A DRINK CONTAINING ALCOHOL: NEVER
HOW MANY STANDARD DRINKS CONTAINING ALCOHOL DO YOU HAVE ON A TYPICAL DAY: PATIENT DOES NOT DRINK

## 2025-04-09 NOTE — PATIENT INSTRUCTIONS
doctor if you have any problems.  Where can you learn more?  Go to https://www.[employer].net/patientEd and enter F075 to learn more about \"A Healthy Heart: Care Instructions.\"  Current as of: July 31, 2024  Content Version: 14.4  © 3997-3995 [employer].   Care instructions adapted under license by [employer]. If you have questions about a medical condition or this instruction, always ask your healthcare professional. [employer] disclaims any warranty or liability for your use of this information.    Personalized Preventive Plan for Rob Denney - 4/9/2025  Medicare offers a range of preventive health benefits. Some of the tests and screenings are paid in full while other may be subject to a deductible, co-insurance, and/or copay.  Some of these benefits include a comprehensive review of your medical history including lifestyle, illnesses that may run in your family, and various assessments and screenings as appropriate.  After reviewing your medical record and screening and assessments performed today your provider may have ordered immunizations, labs, imaging, and/or referrals for you.  A list of these orders (if applicable) as well as your Preventive Care list are included within your After Visit Summary for your review.

## 2025-04-09 NOTE — PROGRESS NOTES
to the patient in written form: see Patient Instructions/AVS.     Reviewed and updated this visit:  Tobacco  Allergies  Meds  Problems  Med Hx  Surg Hx  Fam Hx  Sexual   Hx                  The patient (or guardian, if applicable) and other individuals in attendance with the patient were advised that Artificial Intelligence will be utilized during this visit to record and process the conversation to generate a clinical note. The patient (or guardian, if applicable) and other individuals in attendance at the appointment consented to the use of AI, including the recording.    Kandi HARDWICK

## 2025-05-09 RX ORDER — PIOGLITAZONE 15 MG/1
15 TABLET ORAL DAILY
Qty: 90 TABLET | Refills: 3 | Status: SHIPPED | OUTPATIENT
Start: 2025-05-09

## 2025-05-09 NOTE — TELEPHONE ENCOUNTER
Medication Refill Request    Rob Denney is requesting a refill of the following medication(s):     pioglitazone (ACTOS) 15 MG tablet      Please send refill to:     [facility] - [place], [place] - [address] -  [phone] - F [phone] [address]  [facility] [address]  Phone: [phone] Fax: [phone]

## 2025-06-06 DIAGNOSIS — E11.42 TYPE 2 DIABETES MELLITUS WITH DIABETIC POLYNEUROPATHY, WITH LONG-TERM CURRENT USE OF INSULIN (HCC): ICD-10-CM

## 2025-06-06 DIAGNOSIS — Z79.4 TYPE 2 DIABETES MELLITUS WITH DIABETIC POLYNEUROPATHY, WITH LONG-TERM CURRENT USE OF INSULIN (HCC): ICD-10-CM

## 2025-06-06 RX ORDER — LINAGLIPTIN 5 MG/1
5 TABLET, FILM COATED ORAL DAILY
Qty: 90 TABLET | Refills: 3 | Status: SHIPPED | OUTPATIENT
Start: 2025-06-06

## 2025-07-13 RX ORDER — ATORVASTATIN CALCIUM 10 MG/1
10 TABLET, FILM COATED ORAL DAILY
Qty: 90 TABLET | Refills: 3 | Status: SHIPPED | OUTPATIENT
Start: 2025-07-13

## 2025-07-31 NOTE — TELEPHONE ENCOUNTER
Medication Refill Request    Rob Denney is requesting a refill of the following medication(s):   B-D UF III MINI PEN NEEDLES 31G X 5 MM MISC     insulin lispro, 1 Unit Dial, (HUMALOG KWIKPEN) 100 UNIT/ML SOPN   Please send refill to:     [facility] - [place], [place] - [address] -  [phone] - F [phone]  [address]  [facility] [address]  Phone: [phone] Fax: [phone]

## 2025-08-01 RX ORDER — INSULIN LISPRO 100 [IU]/ML
INJECTION, SOLUTION INTRAVENOUS; SUBCUTANEOUS
Qty: 135 ML | Refills: 3 | Status: SHIPPED | OUTPATIENT
Start: 2025-08-01

## 2025-09-02 DIAGNOSIS — E11.65 INADEQUATELY CONTROLLED DIABETES MELLITUS (HCC): ICD-10-CM

## 2025-09-02 DIAGNOSIS — Z79.4 TYPE 2 DIABETES MELLITUS WITH DIABETIC POLYNEUROPATHY, WITH LONG-TERM CURRENT USE OF INSULIN (HCC): ICD-10-CM

## 2025-09-02 DIAGNOSIS — I10 ESSENTIAL HYPERTENSION: ICD-10-CM

## 2025-09-02 DIAGNOSIS — Z09 HOSPITAL DISCHARGE FOLLOW-UP: ICD-10-CM

## 2025-09-02 DIAGNOSIS — E11.42 TYPE 2 DIABETES MELLITUS WITH DIABETIC POLYNEUROPATHY, WITH LONG-TERM CURRENT USE OF INSULIN (HCC): ICD-10-CM

## 2025-09-02 RX ORDER — INSULIN GLARGINE 100 [IU]/ML
INJECTION, SOLUTION SUBCUTANEOUS
Qty: 45 ML | Refills: 3 | OUTPATIENT
Start: 2025-09-02

## 2025-09-02 RX ORDER — FLURBIPROFEN SODIUM 0.3 MG/ML
1 SOLUTION/ DROPS OPHTHALMIC 3 TIMES DAILY
Qty: 450 EACH | Refills: 1 | Status: SHIPPED | OUTPATIENT
Start: 2025-09-02

## (undated) DEVICE — LINER,SEMI-RIGID,3000CC,50EA/CS: Brand: MEDLINE

## (undated) DEVICE — DEVICE TRNSF SPIK STL 2008S] MICROTEK MEDICAL INC]

## (undated) DEVICE — (D)PREP SKN CHLRAPRP APPL 26ML -- CONVERT TO ITEM 371833

## (undated) DEVICE — SWAB CULT LIQ STUART AGR AERB MOD IN BRK SGL RAYON TIP PLAS 220099] BECTON DICKINSON MICRO]

## (undated) DEVICE — PENCIL ES L3M BTTN SWCH S STL HEX LOK BLDE ELECTRD HOLSTER

## (undated) DEVICE — MEDI-VAC NON-CONDUCTIVE SUCTION TUBING: Brand: CARDINAL HEALTH

## (undated) DEVICE — 4-PORT MANIFOLD: Brand: NEPTUNE 2

## (undated) DEVICE — EXTREMITY III-LF: Brand: MEDLINE INDUSTRIES, INC.

## (undated) DEVICE — CONTINU-FLO SOLUTION SET, 2 INJECTION SITES, MALE LUER LOCK ADAPTER WITH RETRACTABLE COLLAR, LARGE BORE STOPCOCK WITH ROTATING MALE LUER LOCK EXTENSION SET, 2 INJECTION SITES, MALE LUER LOCK ADAPTER WITH RETRACTABLE COLLAR: Brand: INTERLINK/CONTINU-FLO

## (undated) DEVICE — SNARE L240CM LOOP W27MM STIFF WIRE SHT THROW STD OVL M SENS

## (undated) DEVICE — CONTAINER,SPECIMEN,OR STERILE,4OZ: Brand: MEDLINE

## (undated) DEVICE — TOWEL SURG W17XL27IN STD BLU COT NONFENESTRATED PREWASHED

## (undated) DEVICE — (D)SYR 10ML 1/5ML GRAD NSAF -- PKGING CHANGE USE ITEM 338027

## (undated) DEVICE — DRAPE,REIN 53X77,STERILE: Brand: MEDLINE

## (undated) DEVICE — CULTURETTE SGL EVAC TUBE PALL -- 100/CA

## (undated) DEVICE — TRAY PREP DRY W/ PREM GLV 2 APPL 6 SPNG 2 UNDPD 1 OVERWRAP

## (undated) DEVICE — 1200 GUARD II KIT W/5MM TUBE W/O VAC TUBE: Brand: GUARDIAN

## (undated) DEVICE — ELECTRODE PT RET AD L9FT HI MOIST COND ADH HYDRGEL CORDED

## (undated) DEVICE — REM POLYHESIVE ADULT PATIENT RETURN ELECTRODE: Brand: VALLEYLAB

## (undated) DEVICE — SOLUTION IV 1000ML 0.9% SOD CHL

## (undated) DEVICE — Z INACTIVE NO USAGE TURNOVER KIT RM CLEANOP

## (undated) DEVICE — DRAPE,EXTREMITY,89X128,STERILE: Brand: MEDLINE

## (undated) DEVICE — ABDOMINAL PAD: Brand: DERMACEA

## (undated) DEVICE — SOLUTION LACTATED RINGERS INJECTION USP

## (undated) DEVICE — KENDALL DL ECG CABLE AND LEAD WIRE SYSTEM, 3-LEAD, SINGLE PATIENT USE: Brand: KENDALL

## (undated) DEVICE — X-RAY SPONGES,16 PLY: Brand: DERMACEA

## (undated) DEVICE — 3M™ TEGADERM™ TRANSPARENT FILM DRESSING FRAME STYLE, 1624W, 2-3/8 IN X 2-3/4 IN (6 CM X 7 CM), 100/CT 4CT/CASE: Brand: 3M™ TEGADERM™

## (undated) DEVICE — PRECISION (9.0 X 0.51 X 31.0MM)

## (undated) DEVICE — SUTURE VCRL SZ 3-0 L27IN ABSRB UD L26MM SH 1/2 CIR J416H

## (undated) DEVICE — OCCLUSIVE GAUZE STRIP,3% BISMUTH TRIBROMOPHENATE IN PETROLATUM BLEND: Brand: XEROFORM

## (undated) DEVICE — GAUZE SPONGES,12 PLY: Brand: CURITY

## (undated) DEVICE — STERILE POLYISOPRENE POWDER-FREE SURGICAL GLOVES WITH EMOLLIENT COATING: Brand: PROTEXIS

## (undated) DEVICE — TUBING IRRIG L77IN DIA0.241IN L BOR FOR CYSTO W/ NVENT

## (undated) DEVICE — STERILE POLYISOPRENE POWDER-FREE SURGICAL GLOVES: Brand: PROTEXIS

## (undated) DEVICE — DEVON™ KNEE AND BODY STRAP 60" X 3" (1.5 M X 7.6 CM): Brand: DEVON

## (undated) DEVICE — BANDAGE COMPR 9 FTX4 IN SMOOTH COMFORTABLE SYNTH ESMRK LF

## (undated) DEVICE — KIT INFECTION CTRL ST FRAN --

## (undated) DEVICE — TUBING IRRIG L10IN DISP PMP ENDOGATOR E

## (undated) DEVICE — BLUNT CANNULA: Brand: MONOJECT

## (undated) DEVICE — SYRINGE 20ML LL S/C 50

## (undated) DEVICE — PADDING CST 4IN STERILE --

## (undated) DEVICE — GOWN,ISO,KNITCUFF, PREMIUM BLUE, LV3,XL: Brand: MEDLINE INDUSTRIES, INC.

## (undated) DEVICE — SURGICAL PROCEDURE PACK BASIN MAJ SET CUST NO CAUT

## (undated) DEVICE — NEEDLE HYPO 25GA L1.5IN BVL ORIENTED ECLIPSE

## (undated) DEVICE — KERLIX BANDAGE ROLL: Brand: KERLIX

## (undated) DEVICE — DISPOSABLE TOURNIQUET CUFF SINGLE BLADDER, DUAL PORT AND QUICK CONNECT CONNECTOR: Brand: COLOR CUFF

## (undated) DEVICE — HANDLE LT SNAP ON ULT DURABLE LENS FOR TRUMPF ALC DISPOSABLE

## (undated) DEVICE — SUTURE VCRL SZ 3-0 L27IN ABSRB UD FS-2 L19MM 1/2 CIR J423H

## (undated) DEVICE — SUT ETHLN 3-0 18IN PS1 BLK --

## (undated) DEVICE — GOWN,SIRUS,NONRNF,SETINSLV,XL,20/CS: Brand: MEDLINE

## (undated) DEVICE — TRAP SPEC POLYP REM STRNR CLN DSGN MAGNIFYING WIND DISP

## (undated) DEVICE — SUTURE ETHLN SZ 2-0 L18IN NONABSORBABLE BLK L19MM PS-2 PRIM 593H

## (undated) DEVICE — INTENDED FOR TISSUE SEPARATION, AND OTHER PROCEDURES THAT REQUIRE A SHARP SURGICAL BLADE TO PUNCTURE OR CUT.: Brand: BARD-PARKER ® CARBON RIB-BACK BLADES

## (undated) DEVICE — BASIC PACK: Brand: CONVERTORS

## (undated) DEVICE — SOLUTION IRRIG 3000ML 0.9% SOD CHL FLX CONT 0797208] ICU MEDICAL INC]

## (undated) DEVICE — HOOK LOCK LATEX FREE ELASTIC BANDAGE 4INX5YD

## (undated) DEVICE — SET 2ND L34IN N DEHP THE QUEENS MED CNTR VALUELINK

## (undated) DEVICE — KIT ENDO OP4 CA 1.1+ BX20

## (undated) DEVICE — LIGHT HANDLE: Brand: DEVON

## (undated) DEVICE — SOLUTION IRRIG 1000ML H2O PIC PLAS SHATTERPROOF CONTAINER